# Patient Record
Sex: FEMALE | Race: WHITE | NOT HISPANIC OR LATINO | Employment: OTHER | RURAL
[De-identification: names, ages, dates, MRNs, and addresses within clinical notes are randomized per-mention and may not be internally consistent; named-entity substitution may affect disease eponyms.]

---

## 2024-01-18 ENCOUNTER — HOSPITAL ENCOUNTER (INPATIENT)
Facility: HOSPITAL | Age: 89
LOS: 19 days | Discharge: HOME | DRG: 177 | End: 2024-02-06
Attending: FAMILY MEDICINE | Admitting: FAMILY MEDICINE
Payer: MEDICARE

## 2024-01-18 DIAGNOSIS — R53.1 GENERALIZED WEAKNESS: ICD-10-CM

## 2024-01-18 DIAGNOSIS — B02.9 HERPES ZOSTER WITHOUT COMPLICATION: Primary | ICD-10-CM

## 2024-01-18 DIAGNOSIS — K21.9 GASTROESOPHAGEAL REFLUX DISEASE, UNSPECIFIED WHETHER ESOPHAGITIS PRESENT: ICD-10-CM

## 2024-01-18 DIAGNOSIS — R07.9 CHEST PAIN: ICD-10-CM

## 2024-01-18 DIAGNOSIS — R06.02 SHORTNESS OF BREATH: ICD-10-CM

## 2024-01-18 DIAGNOSIS — R07.89 ATYPICAL CHEST PAIN: ICD-10-CM

## 2024-01-18 DIAGNOSIS — J84.9 INTERSTITIAL LUNG DISEASE: ICD-10-CM

## 2024-01-18 DIAGNOSIS — R63.0 POOR APPETITE: ICD-10-CM

## 2024-01-18 DIAGNOSIS — E03.9 HYPOTHYROIDISM, UNSPECIFIED TYPE: ICD-10-CM

## 2024-01-18 DIAGNOSIS — E11.9 TYPE 2 DIABETES MELLITUS WITHOUT COMPLICATION, UNSPECIFIED WHETHER LONG TERM INSULIN USE: ICD-10-CM

## 2024-01-18 PROBLEM — I10 HYPERTENSION: Status: ACTIVE | Noted: 2024-01-18

## 2024-01-18 PROBLEM — M06.9 RHEUMATOID ARTHRITIS, UNSPECIFIED: Status: RESOLVED | Noted: 2024-01-18 | Resolved: 2024-01-18

## 2024-01-18 PROBLEM — U07.1 PNEUMONIA DUE TO COVID-19 VIRUS: Status: ACTIVE | Noted: 2024-01-18

## 2024-01-18 PROBLEM — M06.9 RHEUMATOID ARTHRITIS, UNSPECIFIED: Status: ACTIVE | Noted: 2024-01-18

## 2024-01-18 PROBLEM — J12.82 PNEUMONIA DUE TO COVID-19 VIRUS: Status: ACTIVE | Noted: 2024-01-18

## 2024-01-18 LAB
ANION GAP SERPL CALCULATED.3IONS-SCNC: 13 MMOL/L (ref 7–16)
ATYPICAL LYMPHOCYTES: ABNORMAL
BACTERIA #/AREA URNS HPF: NORMAL /HPF
BASOPHILS # BLD AUTO: 0.01 K/UL (ref 0–0.2)
BASOPHILS NFR BLD AUTO: 0.2 % (ref 0–1)
BILIRUB UR QL STRIP: NEGATIVE
BUN SERPL-MCNC: 12 MG/DL (ref 7–18)
BUN/CREAT SERPL: 11 (ref 6–20)
CALCIUM SERPL-MCNC: 9.8 MG/DL (ref 8.5–10.1)
CHLORIDE SERPL-SCNC: 100 MMOL/L (ref 98–107)
CLARITY UR: CLEAR
CO2 SERPL-SCNC: 26 MMOL/L (ref 21–32)
COLOR UR: YELLOW
CREAT SERPL-MCNC: 1.12 MG/DL (ref 0.55–1.02)
DIFFERENTIAL METHOD BLD: ABNORMAL
EGFR (NO RACE VARIABLE) (RUSH/TITUS): 47 ML/MIN/1.73M2
EOSINOPHIL # BLD AUTO: 0.05 K/UL (ref 0–0.5)
EOSINOPHIL NFR BLD AUTO: 1 % (ref 1–4)
EOSINOPHIL NFR BLD MANUAL: 1 % (ref 1–4)
ERYTHROCYTE [DISTWIDTH] IN BLOOD BY AUTOMATED COUNT: 12.6 % (ref 11.5–14.5)
GLUCOSE SERPL-MCNC: 243 MG/DL (ref 70–105)
GLUCOSE SERPL-MCNC: 269 MG/DL (ref 70–105)
GLUCOSE SERPL-MCNC: 304 MG/DL (ref 74–106)
GLUCOSE UR STRIP-MCNC: 500 MG/DL
HCT VFR BLD AUTO: 38 % (ref 38–47)
HGB BLD-MCNC: 12.9 G/DL (ref 12–16)
KETONES UR STRIP-SCNC: NEGATIVE MG/DL
LEUKOCYTE ESTERASE UR QL STRIP: NEGATIVE
LYMPHOCYTES # BLD AUTO: 0.85 K/UL (ref 1–4.8)
LYMPHOCYTES NFR BLD AUTO: 16.9 % (ref 27–41)
LYMPHOCYTES NFR BLD MANUAL: 21 % (ref 27–41)
MCH RBC QN AUTO: 28.5 PG (ref 27–31)
MCHC RBC AUTO-ENTMCNC: 33.9 G/DL (ref 32–36)
MCV RBC AUTO: 83.9 FL (ref 80–96)
MONOCYTES # BLD AUTO: 0.96 K/UL (ref 0–0.8)
MONOCYTES NFR BLD AUTO: 19.1 % (ref 2–6)
MONOCYTES NFR BLD MANUAL: 14 % (ref 2–6)
MPC BLD CALC-MCNC: 11.6 FL (ref 9.4–12.4)
NEUTROPHILS # BLD AUTO: 3.16 K/UL (ref 1.8–7.7)
NEUTROPHILS NFR BLD AUTO: 62.8 % (ref 53–65)
NEUTS BAND NFR BLD MANUAL: 7 % (ref 1–5)
NEUTS SEG NFR BLD MANUAL: 57 % (ref 50–62)
NITRITE UR QL STRIP: NEGATIVE
NRBC BLD MANUAL-RTO: ABNORMAL %
PH UR STRIP: 5.5 PH UNITS
PLATELET # BLD AUTO: 240 K/UL (ref 150–400)
PLATELET MORPHOLOGY: ABNORMAL
POTASSIUM SERPL-SCNC: 3.9 MMOL/L (ref 3.5–5.1)
PROT UR QL STRIP: 100
RBC # BLD AUTO: 4.53 M/UL (ref 4.2–5.4)
RBC # UR STRIP: NEGATIVE /UL
RBC MORPH BLD: NORMAL
REACTIVE LYMPHOCYTES: ABNORMAL
ROULEAUX BLD QL SMEAR: ABNORMAL
SODIUM SERPL-SCNC: 135 MMOL/L (ref 136–145)
SP GR UR STRIP: >=1.03
SQUAMOUS #/AREA URNS LPF: NORMAL /LPF
UROBILINOGEN UR STRIP-ACNC: 0.2 MG/DL
WBC # BLD AUTO: 5.03 K/UL (ref 4.5–11)
WBC #/AREA URNS HPF: NORMAL /HPF

## 2024-01-18 PROCEDURE — 99900035 HC TECH TIME PER 15 MIN (STAT)

## 2024-01-18 PROCEDURE — 27000982 HC MATTRESS, MATRIX LAL RENTAL

## 2024-01-18 PROCEDURE — 85025 COMPLETE CBC W/AUTO DIFF WBC: CPT | Performed by: NURSE PRACTITIONER

## 2024-01-18 PROCEDURE — 25000003 PHARM REV CODE 250: Performed by: FAMILY MEDICINE

## 2024-01-18 PROCEDURE — 82962 GLUCOSE BLOOD TEST: CPT

## 2024-01-18 PROCEDURE — 11000004 HC SNF PRIVATE

## 2024-01-18 PROCEDURE — 94761 N-INVAS EAR/PLS OXIMETRY MLT: CPT

## 2024-01-18 PROCEDURE — 63600175 PHARM REV CODE 636 W HCPCS: Performed by: NURSE PRACTITIONER

## 2024-01-18 PROCEDURE — 81003 URINALYSIS AUTO W/O SCOPE: CPT | Performed by: NURSE PRACTITIONER

## 2024-01-18 PROCEDURE — 83036 HEMOGLOBIN GLYCOSYLATED A1C: CPT | Performed by: NURSE PRACTITIONER

## 2024-01-18 PROCEDURE — 27000944

## 2024-01-18 PROCEDURE — 25000003 PHARM REV CODE 250: Performed by: NURSE PRACTITIONER

## 2024-01-18 PROCEDURE — 99305 1ST NF CARE MODERATE MDM 35: CPT | Mod: AI,,, | Performed by: FAMILY MEDICINE

## 2024-01-18 PROCEDURE — 80048 BASIC METABOLIC PNL TOTAL CA: CPT | Performed by: NURSE PRACTITIONER

## 2024-01-18 RX ORDER — ALBUTEROL SULFATE 0.83 MG/ML
2.5 SOLUTION RESPIRATORY (INHALATION) EVERY 6 HOURS PRN
Status: DISCONTINUED | OUTPATIENT
Start: 2024-01-18 | End: 2024-02-06 | Stop reason: HOSPADM

## 2024-01-18 RX ORDER — CALCIUM CARBONATE 200(500)MG
500 TABLET,CHEWABLE ORAL 2 TIMES DAILY PRN
Status: DISCONTINUED | OUTPATIENT
Start: 2024-01-18 | End: 2024-02-06 | Stop reason: HOSPADM

## 2024-01-18 RX ORDER — FAMOTIDINE 20 MG/1
20 TABLET, FILM COATED ORAL DAILY
Status: DISCONTINUED | OUTPATIENT
Start: 2024-01-19 | End: 2024-02-06 | Stop reason: HOSPADM

## 2024-01-18 RX ORDER — CHOLECALCIFEROL (VITAMIN D3) 25 MCG
2000 TABLET ORAL DAILY
Status: DISCONTINUED | OUTPATIENT
Start: 2024-01-19 | End: 2024-01-26

## 2024-01-18 RX ORDER — INSULIN ASPART 100 [IU]/ML
0-5 INJECTION, SOLUTION INTRAVENOUS; SUBCUTANEOUS
Status: COMPLETED | OUTPATIENT
Start: 2024-01-18 | End: 2024-01-22

## 2024-01-18 RX ORDER — CHOLECALCIFEROL (VITAMIN D3) 25 MCG
2000 TABLET ORAL DAILY
Status: ON HOLD | COMMUNITY
End: 2024-02-05 | Stop reason: HOSPADM

## 2024-01-18 RX ORDER — DOCUSATE SODIUM 100 MG/1
100 CAPSULE, LIQUID FILLED ORAL 2 TIMES DAILY PRN
Status: DISCONTINUED | OUTPATIENT
Start: 2024-01-18 | End: 2024-02-06 | Stop reason: HOSPADM

## 2024-01-18 RX ORDER — GLUCOSAM/CHONDRO/HERB 149/HYAL 750-100 MG
1 TABLET ORAL DAILY
Status: DISCONTINUED | OUTPATIENT
Start: 2024-01-19 | End: 2024-02-06 | Stop reason: HOSPADM

## 2024-01-18 RX ORDER — MECLIZINE HYDROCHLORIDE 50 MG/1
25 TABLET ORAL 3 TIMES DAILY PRN
Status: ON HOLD | COMMUNITY
End: 2024-02-05 | Stop reason: HOSPADM

## 2024-01-18 RX ORDER — LEVOTHYROXINE SODIUM 50 UG/1
50 TABLET ORAL
Status: ON HOLD | COMMUNITY
End: 2024-02-05

## 2024-01-18 RX ORDER — ALBUTEROL SULFATE 90 UG/1
2 AEROSOL, METERED RESPIRATORY (INHALATION) EVERY 4 HOURS PRN
Status: DISCONTINUED | OUTPATIENT
Start: 2024-01-18 | End: 2024-01-18

## 2024-01-18 RX ORDER — CETIRIZINE HYDROCHLORIDE 10 MG/1
10 TABLET ORAL DAILY PRN
Status: ON HOLD | COMMUNITY
End: 2024-02-05 | Stop reason: HOSPADM

## 2024-01-18 RX ORDER — GABAPENTIN 100 MG/1
100 CAPSULE ORAL 3 TIMES DAILY PRN
Status: ON HOLD | COMMUNITY
End: 2024-02-05

## 2024-01-18 RX ORDER — ASCORBIC ACID 500 MG
1000 TABLET ORAL 2 TIMES DAILY
Status: DISCONTINUED | OUTPATIENT
Start: 2024-01-19 | End: 2024-02-06 | Stop reason: HOSPADM

## 2024-01-18 RX ORDER — GLUCAGON 1 MG
1 KIT INJECTION
Status: DISCONTINUED | OUTPATIENT
Start: 2024-01-18 | End: 2024-02-06 | Stop reason: HOSPADM

## 2024-01-18 RX ORDER — LEVOFLOXACIN 500 MG/1
500 TABLET, FILM COATED ORAL DAILY
Status: ON HOLD | COMMUNITY
Start: 2024-01-19 | End: 2024-02-05 | Stop reason: HOSPADM

## 2024-01-18 RX ORDER — FAMOTIDINE 20 MG/1
20 TABLET, FILM COATED ORAL NIGHTLY
Status: ON HOLD | COMMUNITY
End: 2024-02-05

## 2024-01-18 RX ORDER — ZINC SULFATE 50(220)MG
220 CAPSULE ORAL DAILY
Status: ON HOLD | COMMUNITY
End: 2024-02-05 | Stop reason: HOSPADM

## 2024-01-18 RX ORDER — HYDRALAZINE HYDROCHLORIDE 25 MG/1
25 TABLET, FILM COATED ORAL 2 TIMES DAILY
Status: DISCONTINUED | OUTPATIENT
Start: 2024-01-18 | End: 2024-02-06 | Stop reason: HOSPADM

## 2024-01-18 RX ORDER — AMOXICILLIN 250 MG
1 CAPSULE ORAL 2 TIMES DAILY
Status: DISCONTINUED | OUTPATIENT
Start: 2024-01-18 | End: 2024-02-06 | Stop reason: HOSPADM

## 2024-01-18 RX ORDER — IBUPROFEN 100 MG/5ML
1000 SUSPENSION, ORAL (FINAL DOSE FORM) ORAL 2 TIMES DAILY
Status: ON HOLD | COMMUNITY
End: 2024-02-05 | Stop reason: HOSPADM

## 2024-01-18 RX ORDER — AMOXICILLIN 500 MG
1 CAPSULE ORAL DAILY
Status: ON HOLD | COMMUNITY
End: 2024-02-05

## 2024-01-18 RX ORDER — TALC
6 POWDER (GRAM) TOPICAL NIGHTLY PRN
Status: DISCONTINUED | OUTPATIENT
Start: 2024-01-18 | End: 2024-01-26

## 2024-01-18 RX ORDER — LEVOFLOXACIN 500 MG/1
500 TABLET, FILM COATED ORAL DAILY
Status: COMPLETED | OUTPATIENT
Start: 2024-01-19 | End: 2024-01-20

## 2024-01-18 RX ORDER — IBUPROFEN 200 MG
16 TABLET ORAL
Status: DISCONTINUED | OUTPATIENT
Start: 2024-01-18 | End: 2024-02-06 | Stop reason: HOSPADM

## 2024-01-18 RX ORDER — ACETAMINOPHEN 325 MG/1
650 TABLET ORAL EVERY 6 HOURS PRN
Status: DISCONTINUED | OUTPATIENT
Start: 2024-01-18 | End: 2024-02-06 | Stop reason: HOSPADM

## 2024-01-18 RX ORDER — GABAPENTIN 100 MG/1
100 CAPSULE ORAL 3 TIMES DAILY PRN
Status: DISCONTINUED | OUTPATIENT
Start: 2024-01-18 | End: 2024-02-06 | Stop reason: HOSPADM

## 2024-01-18 RX ORDER — DOCUSATE SODIUM 100 MG/1
100 CAPSULE, LIQUID FILLED ORAL DAILY
Status: DISCONTINUED | OUTPATIENT
Start: 2024-01-18 | End: 2024-01-18

## 2024-01-18 RX ORDER — HYDRALAZINE HYDROCHLORIDE 25 MG/1
25 TABLET, FILM COATED ORAL 2 TIMES DAILY
Status: ON HOLD | COMMUNITY
End: 2024-02-05

## 2024-01-18 RX ORDER — CETIRIZINE HYDROCHLORIDE 10 MG/1
10 TABLET ORAL DAILY PRN
Status: DISCONTINUED | OUTPATIENT
Start: 2024-01-18 | End: 2024-01-22

## 2024-01-18 RX ORDER — THEOPHYLLINE 400 MG/1
200 TABLET, EXTENDED RELEASE ORAL 2 TIMES DAILY
Status: ON HOLD | COMMUNITY
End: 2024-02-05 | Stop reason: HOSPADM

## 2024-01-18 RX ORDER — IBUPROFEN 200 MG
24 TABLET ORAL
Status: DISCONTINUED | OUTPATIENT
Start: 2024-01-18 | End: 2024-02-06 | Stop reason: HOSPADM

## 2024-01-18 RX ORDER — METOPROLOL TARTRATE 50 MG/1
50 TABLET ORAL DAILY
Status: DISCONTINUED | OUTPATIENT
Start: 2024-01-19 | End: 2024-02-06 | Stop reason: HOSPADM

## 2024-01-18 RX ORDER — ENOXAPARIN SODIUM 100 MG/ML
40 INJECTION SUBCUTANEOUS EVERY 24 HOURS
Status: DISCONTINUED | OUTPATIENT
Start: 2024-01-18 | End: 2024-01-19 | Stop reason: DRUGHIGH

## 2024-01-18 RX ORDER — MECLIZINE HCL 12.5 MG 12.5 MG/1
25 TABLET ORAL 3 TIMES DAILY PRN
Status: DISCONTINUED | OUTPATIENT
Start: 2024-01-18 | End: 2024-01-30

## 2024-01-18 RX ORDER — LANOLIN ALCOHOL/MO/W.PET/CERES
400 CREAM (GRAM) TOPICAL DAILY
Status: DISCONTINUED | OUTPATIENT
Start: 2024-01-19 | End: 2024-02-06 | Stop reason: HOSPADM

## 2024-01-18 RX ORDER — MUPIROCIN 20 MG/G
OINTMENT TOPICAL 2 TIMES DAILY
Status: COMPLETED | OUTPATIENT
Start: 2024-01-18 | End: 2024-01-23

## 2024-01-18 RX ORDER — ZINC SULFATE 50(220)MG
220 CAPSULE ORAL DAILY
Status: DISCONTINUED | OUTPATIENT
Start: 2024-01-19 | End: 2024-01-26

## 2024-01-18 RX ORDER — METOPROLOL TARTRATE 100 MG/1
50 TABLET ORAL DAILY
Status: ON HOLD | COMMUNITY
End: 2024-02-05

## 2024-01-18 RX ORDER — LEVOTHYROXINE SODIUM 50 UG/1
50 TABLET ORAL
Status: DISCONTINUED | OUTPATIENT
Start: 2024-01-19 | End: 2024-02-06 | Stop reason: HOSPADM

## 2024-01-18 RX ORDER — DOCUSATE SODIUM 100 MG/1
100 CAPSULE, LIQUID FILLED ORAL 2 TIMES DAILY PRN
Status: ON HOLD | COMMUNITY
End: 2024-02-05

## 2024-01-18 RX ORDER — ONDANSETRON 4 MG/1
4 TABLET, ORALLY DISINTEGRATING ORAL EVERY 8 HOURS PRN
Status: DISCONTINUED | OUTPATIENT
Start: 2024-01-18 | End: 2024-02-06 | Stop reason: HOSPADM

## 2024-01-18 RX ORDER — ALBUTEROL SULFATE 90 UG/1
2 AEROSOL, METERED RESPIRATORY (INHALATION) EVERY 6 HOURS PRN
Status: DISCONTINUED | OUTPATIENT
Start: 2024-01-18 | End: 2024-01-18

## 2024-01-18 RX ADMIN — THEOPHYLLINE ANHYDROUS 200 MG: 200 CAPSULE, EXTENDED RELEASE ORAL at 08:01

## 2024-01-18 RX ADMIN — ENOXAPARIN SODIUM 40 MG: 100 INJECTION SUBCUTANEOUS at 05:01

## 2024-01-18 RX ADMIN — HYDRALAZINE HYDROCHLORIDE 25 MG: 25 TABLET ORAL at 08:01

## 2024-01-18 RX ADMIN — INSULIN ASPART 1 UNITS: 100 INJECTION, SOLUTION INTRAVENOUS; SUBCUTANEOUS at 09:01

## 2024-01-18 RX ADMIN — SENNOSIDES AND DOCUSATE SODIUM 1 TABLET: 8.6; 5 TABLET ORAL at 08:01

## 2024-01-18 RX ADMIN — MUPIROCIN: 20 OINTMENT TOPICAL at 09:01

## 2024-01-18 RX ADMIN — ACETAMINOPHEN 650 MG: 325 TABLET ORAL at 11:01

## 2024-01-18 NOTE — NURSING
Received patient to room 1112 via wheelchair. Patients family brought her from [facility]. No acute distress noted. Resp even et unlabored. Safety measures in place. CB in reach. Family at bedside. Will cont to monitor.

## 2024-01-18 NOTE — SUBJECTIVE & OBJECTIVE
Past Medical History:   Diagnosis Date    Arthritis     Asthma     Benign paroxysmal vertigo, bilateral     COPD (chronic obstructive pulmonary disease)     COVID     Diabetes mellitus     Diverticulitis     Hemorrhoids     Hypertension     Hypothyroidism, unspecified     Interstitial lung disease     Psoriasis     Rheumatoid arthritis, unspecified        Past Surgical History:   Procedure Laterality Date    CARDIAC CATHETERIZATION      cataract surgery Bilateral     CHOLECYSTECTOMY      COLONOSCOPY      ESOPHAGOGASTRODUODENOSCOPY      HYSTERECTOMY      INGUINAL HERNIA REPAIR         Review of patient's allergies indicates:   Allergen Reactions    Cefdinir     Clarithromycin     Meperidine        No current facility-administered medications on file prior to encounter.     No current outpatient medications on file prior to encounter.     Family History    None       Tobacco Use    Smoking status: Never    Smokeless tobacco: Never   Substance and Sexual Activity    Alcohol use: Never    Drug use: Not on file    Sexual activity: Not on file     Review of Systems   Constitutional:  Positive for appetite change and fatigue. Negative for fever.        Reports poor appetite for past week   HENT:  Negative for sore throat and trouble swallowing.    Eyes:  Negative for redness.   Respiratory:  Positive for cough. Negative for choking, chest tightness and shortness of breath.    Gastrointestinal:  Positive for nausea. Negative for abdominal distention, abdominal pain, constipation, diarrhea and vomiting.        Reports having slight nausea when she thinks about eating - for past week    Genitourinary:  Negative for dysuria.   Musculoskeletal:  Negative for arthralgias, back pain, neck pain and neck stiffness.   Skin:  Negative for pallor and wound.        Reports bruising easily   Neurological:  Negative for light-headedness and headaches.     Objective:     Vital Signs (Most Recent):  Temp: 97.9 °F (36.6 °C) (01/18/24  1436)  Pulse: 85 (01/18/24 1436)  Resp: 20 (01/18/24 1436)  BP: (!) 158/80 (01/18/24 1436)  SpO2: 95 % (01/18/24 1436) Vital Signs (24h Range):  Temp:  [97.9 °F (36.6 °C)] 97.9 °F (36.6 °C)  Pulse:  [85] 85  Resp:  [20] 20  SpO2:  [95 %] 95 %  BP: (158)/(80) 158/80     Weight: 50.3 kg (111 lb)  Body mass index is 20.3 kg/m².     Physical Exam  HENT:      Head: Normocephalic.      Mouth/Throat:      Mouth: Mucous membranes are moist.   Cardiovascular:      Rate and Rhythm: Normal rate and regular rhythm.      Pulses: Normal pulses.      Heart sounds: No murmur heard.     No friction rub. No gallop.   Pulmonary:      Effort: Pulmonary effort is normal. No respiratory distress.      Breath sounds: No wheezing or rhonchi.      Comments: Sat on room air is 95%  Abdominal:      General: Bowel sounds are normal. There is no distension.      Palpations: Abdomen is soft. There is no mass.      Tenderness: There is no abdominal tenderness. There is no guarding or rebound.      Hernia: No hernia is present.   Musculoskeletal:         General: Normal range of motion.      Cervical back: Normal range of motion.   Skin:     General: Skin is warm and dry.      Coloration: Skin is not pale.      Findings: No erythema, lesion or rash.   Neurological:      Mental Status: She is alert and oriented to person, place, and time.      Motor: Weakness present.   Psychiatric:         Mood and Affect: Mood normal.                Significant Labs: All pertinent labs within the past 24 hours have been reviewed.  Recent Lab Results       None            Significant Imaging: I have reviewed all pertinent imaging results/findings within the past 24 hours.

## 2024-01-18 NOTE — ASSESSMENT & PLAN NOTE
Home med is glimepiride 4 mg po bid   Last A1c reviewed- ordered  Current correctional scale  Low  Maintain anti-hyperglycemic dose as follows-   Antihyperglycemics (From admission, onward)      None          Hold Oral hypoglycemics while patient is in the hospital.    Accuchecks ac and hs with low dose SSI coverage

## 2024-01-18 NOTE — HPI
Jackelin Owens is an 89 year old female with pmh of hypertension, type 2 diabetes mellitus, hypothyroidism and interstitial lung disease.Records show that she was diagnosed with covid and flu approx 2 1/2 weeks ago. She was not prescribed tamiflu or paxlovid but was given an antibiotic. She presented to ER at [facility] with increasing SOB on 01/14/2024. Admitted and treated for covid 19 pneumonia. She tested positive for covid while in [facility] ER. She was treated with Levaquin and remdesevir, supplemental o2 . She has been working with therapy on strengthening but continues to have weakness. She has been accepted to [facility] for swing bed placement       01/18/24 VS on arrival are T98.1-87-/80 sat 95% room air. She is awake, alert, oriented. Accompanied by family members. Complains of occasional productive cough, weakness and poor appetite. Discussed code status and she chose [facility].

## 2024-01-18 NOTE — ASSESSMENT & PLAN NOTE
01/18/24 continue lopressor 50 mg po daily     And apresoline 25 mg po BID      While in the hospital, will manage blood pressure as follows; Continue home antihypertensive regimen    Will utilize p.r.n. blood pressure medication only if patient's blood pressure greater than 180/110 and she develops symptoms such as worsening chest pain or shortness of breath.

## 2024-01-18 NOTE — ASSESSMENT & PLAN NOTE
01/18/24 PT and OT to evaluate and treat   Reocrds show that she lives alone. She is household ambulator with rollator.

## 2024-01-18 NOTE — H&P
[facility] - Medical Surgical Unit  Hospital Medicine  History & Physical    Patient Name: Jackelin Owens  MRN: [id number]  Patient Class: IP- Swing  Admission Date: 1/18/2024  Attending Physician: Home Alexis IV, DO   Primary Care Provider: Chaparrita HEWITT         Patient information was obtained from past medical records and ER records.     Subjective:     Principal Problem:Generalized weakness    Chief Complaint:   Chief Complaint   Patient presents with    Weakness        HPI: Jackelin Owens is an 89 year old female with pmh of hypertension, type 2 diabetes mellitus, hypothyroidism and interstitial lung disease.Records show that she was diagnosed with covid and flu approx 2 1/2 weeks ago. She was not prescribed tamiflu or paxlovid but was given an antibiotic. She presented to ER at [facility] with increasing SOB on 01/14/2024. Admitted and treated for covid 19 pneumonia. She tested positive for covid while in [facility] ER. She was treated with Levaquin and remdesevir, supplemental o2 . She has been working with therapy on strengthening but continues to have weakness. She has been accepted to [facility] for swing bed placement       01/18/24 VS on arrival are T98.1-87-/80 sat 95% room air. She is awake, alert, oriented. Accompanied by family members. Complains of occasional productive cough, weakness and poor appetite. Discussed code status and she chose [facility].    Past Medical History:   Diagnosis Date    Arthritis     Asthma     Benign paroxysmal vertigo, bilateral     COPD (chronic obstructive pulmonary disease)     COVID     Diabetes mellitus     Diverticulitis     Hemorrhoids     Hypertension     Hypothyroidism, unspecified     Interstitial lung disease     Psoriasis     Rheumatoid arthritis, unspecified        Past Surgical History:   Procedure Laterality Date    CARDIAC CATHETERIZATION      cataract surgery Bilateral     CHOLECYSTECTOMY      COLONOSCOPY       ESOPHAGOGASTRODUODENOSCOPY      HYSTERECTOMY      INGUINAL HERNIA REPAIR         Review of patient's allergies indicates:   Allergen Reactions    Cefdinir     Clarithromycin     Meperidine        No current facility-administered medications on file prior to encounter.     No current outpatient medications on file prior to encounter.     Family History    None       Tobacco Use    Smoking status: Never    Smokeless tobacco: Never   Substance and Sexual Activity    Alcohol use: Never    Drug use: Not on file    Sexual activity: Not on file     Review of Systems   Constitutional:  Positive for appetite change and fatigue. Negative for fever.        Reports poor appetite for past week   HENT:  Negative for sore throat and trouble swallowing.    Eyes:  Negative for redness.   Respiratory:  Positive for cough. Negative for choking, chest tightness and shortness of breath.    Gastrointestinal:  Positive for nausea. Negative for abdominal distention, abdominal pain, constipation, diarrhea and vomiting.        Reports having slight nausea when she thinks about eating - for past week    Genitourinary:  Negative for dysuria.   Musculoskeletal:  Negative for arthralgias, back pain, neck pain and neck stiffness.   Skin:  Negative for pallor and wound.        Reports bruising easily   Neurological:  Negative for light-headedness and headaches.     Objective:     Vital Signs (Most Recent):  Temp: 97.9 °F (36.6 °C) (01/18/24 1436)  Pulse: 85 (01/18/24 1436)  Resp: 20 (01/18/24 1436)  BP: (!) 158/80 (01/18/24 1436)  SpO2: 95 % (01/18/24 1436) Vital Signs (24h Range):  Temp:  [97.9 °F (36.6 °C)] 97.9 °F (36.6 °C)  Pulse:  [85] 85  Resp:  [20] 20  SpO2:  [95 %] 95 %  BP: (158)/(80) 158/80     Weight: 50.3 kg (111 lb)  Body mass index is 20.3 kg/m².     Physical Exam  HENT:      Head: Normocephalic.      Mouth/Throat:      Mouth: Mucous membranes are moist.   Cardiovascular:      Rate and Rhythm: Normal rate and regular rhythm.       Pulses: Normal pulses.      Heart sounds: No murmur heard.     No friction rub. No gallop.   Pulmonary:      Effort: Pulmonary effort is normal. No respiratory distress.      Breath sounds: No wheezing or rhonchi.      Comments: Sat on room air is 95%  Abdominal:      General: Bowel sounds are normal. There is no distension.      Palpations: Abdomen is soft. There is no mass.      Tenderness: There is no abdominal tenderness. There is no guarding or rebound.      Hernia: No hernia is present.   Musculoskeletal:         General: Normal range of motion.      Cervical back: Normal range of motion.   Skin:     General: Skin is warm and dry.      Coloration: Skin is not pale.      Findings: No erythema, lesion or rash.   Neurological:      Mental Status: She is alert and oriented to person, place, and time.      Motor: Weakness present.   Psychiatric:         Mood and Affect: Mood normal.                Significant Labs: All pertinent labs within the past 24 hours have been reviewed.  Recent Lab Results       None            Significant Imaging: I have reviewed all pertinent imaging results/findings within the past 24 hours.  Assessment/Plan:     * Generalized weakness  01/18/24 PT and OT to evaluate and treat   Reocrds show that she lives alone. She is household ambulator with rollator.    Pneumonia due to COVID-19 virus  01/18/24 has recd levaquin and remdesevir for left lower lobe pneumonia       Initiate standard COVID protocols; COVID-19 testing ,Infection Control notification  and isolation- respiratory, contact and droplet per protocol        Management: Inhaled bronchodilators as needed for shortness of breath.  Supplemental o2 as needed     Advance Care Planning Current advance care plan has been discussed with patient/family/POA and patient currently wishes DNR (Do Not Resuscitate).       Daughter states pt first tested positive for covid on 01/04  She is out of isolation window  O2 sat on room air is  95%    Type 2 diabetes mellitus  Home med is glimepiride 4 mg po bid   Last A1c reviewed- ordered  Current correctional scale  Low  Maintain anti-hyperglycemic dose as follows-   Antihyperglycemics (From admission, onward)      None          Hold Oral hypoglycemics while patient is in the hospital.    Accuchecks ac and hs with low dose SSI coverage     Hypertension  01/18/24 continue lopressor 50 mg po daily     And apresoline 25 mg po BID      While in the hospital, will manage blood pressure as follows; Continue home antihypertensive regimen    Will utilize p.r.n. blood pressure medication only if patient's blood pressure greater than 180/110 and she develops symptoms such as worsening chest pain or shortness of breath.    Interstitial lung disease  01/18/24 continue theophylline 200 mg po BID       Hypothyroidism  01/18/24 continue levothyroxine 50 mcg daily      Poor appetite    01/18/24 diabetic diet   Ensure supplement TID and prn     GERD (gastroesophageal reflux disease)  01/18/24 continue famotidine          VTE Risk Mitigation (From admission, onward)           Ordered     enoxaparin injection 40 mg  Every 24 hours         01/18/24 1435     IP VTE LOW RISK PATIENT  Once         01/18/24 1434     Place CARLOS hose  Until discontinued         01/18/24 1434                                    Home Alexis IV, DO  Department of Hospital Medicine  [facility] - Medical Surgical Unit

## 2024-01-18 NOTE — ASSESSMENT & PLAN NOTE
01/18/24 has recd levaquin and remdesevir for left lower lobe pneumonia       Initiate standard COVID protocols; COVID-19 testing ,Infection Control notification  and isolation- respiratory, contact and droplet per protocol        Management: Inhaled bronchodilators as needed for shortness of breath.  Supplemental o2 as needed     Advance Care Planning  Current advance care plan has been discussed with patient/family/POA and patient currently wishes DNR (Do Not Resuscitate).       Daughter states pt first tested positive for covid on 01/04  She is out of isolation window  O2 sat on room air is 95%

## 2024-01-19 LAB
EST. AVERAGE GLUCOSE BLD GHB EST-MCNC: 186 MG/DL
GLUCOSE SERPL-MCNC: 159 MG/DL (ref 70–105)
GLUCOSE SERPL-MCNC: 159 MG/DL (ref 70–105)
GLUCOSE SERPL-MCNC: 311 MG/DL (ref 70–105)
GLUCOSE SERPL-MCNC: 338 MG/DL (ref 70–105)
HBA1C MFR BLD HPLC: 8.1 % (ref 4.5–6.6)

## 2024-01-19 PROCEDURE — 94761 N-INVAS EAR/PLS OXIMETRY MLT: CPT

## 2024-01-19 PROCEDURE — 27000982 HC MATTRESS, MATRIX LAL RENTAL

## 2024-01-19 PROCEDURE — 82962 GLUCOSE BLOOD TEST: CPT

## 2024-01-19 PROCEDURE — 25000003 PHARM REV CODE 250: Performed by: FAMILY MEDICINE

## 2024-01-19 PROCEDURE — 97165 OT EVAL LOW COMPLEX 30 MIN: CPT

## 2024-01-19 PROCEDURE — 25000003 PHARM REV CODE 250: Performed by: NURSE PRACTITIONER

## 2024-01-19 PROCEDURE — 63600175 PHARM REV CODE 636 W HCPCS: Performed by: FAMILY MEDICINE

## 2024-01-19 PROCEDURE — 27000944

## 2024-01-19 PROCEDURE — 11000004 HC SNF PRIVATE

## 2024-01-19 PROCEDURE — 97161 PT EVAL LOW COMPLEX 20 MIN: CPT

## 2024-01-19 RX ORDER — ENOXAPARIN SODIUM 100 MG/ML
30 INJECTION SUBCUTANEOUS EVERY 24 HOURS
Status: DISCONTINUED | OUTPATIENT
Start: 2024-01-19 | End: 2024-02-06 | Stop reason: HOSPADM

## 2024-01-19 RX ORDER — ALUMINUM HYDROXIDE, MAGNESIUM HYDROXIDE, AND SIMETHICONE 1200; 120; 1200 MG/30ML; MG/30ML; MG/30ML
30 SUSPENSION ORAL 3 TIMES DAILY
Status: DISPENSED | OUTPATIENT
Start: 2024-01-19 | End: 2024-01-22

## 2024-01-19 RX ORDER — LIDOCAINE HYDROCHLORIDE 20 MG/ML
15 SOLUTION OROPHARYNGEAL 3 TIMES DAILY
Status: DISPENSED | OUTPATIENT
Start: 2024-01-19 | End: 2024-01-22

## 2024-01-19 RX ADMIN — MELATONIN TAB 3 MG 6 MG: 3 TAB at 11:01

## 2024-01-19 RX ADMIN — GABAPENTIN 100 MG: 100 CAPSULE ORAL at 08:01

## 2024-01-19 RX ADMIN — ENOXAPARIN SODIUM 30 MG: 30 INJECTION SUBCUTANEOUS at 04:01

## 2024-01-19 RX ADMIN — LEVOFLOXACIN 500 MG: 500 TABLET, FILM COATED ORAL at 08:01

## 2024-01-19 RX ADMIN — MECLIZINE 25 MG: 12.5 TABLET ORAL at 08:01

## 2024-01-19 RX ADMIN — ZINC SULFATE 220 MG (50 MG) CAPSULE 220 MG: CAPSULE at 08:01

## 2024-01-19 RX ADMIN — THEOPHYLLINE ANHYDROUS 200 MG: 200 CAPSULE, EXTENDED RELEASE ORAL at 08:01

## 2024-01-19 RX ADMIN — HYDRALAZINE HYDROCHLORIDE 25 MG: 25 TABLET ORAL at 08:01

## 2024-01-19 RX ADMIN — OMEGA-3 FATTY ACIDS CAP 1000 MG 1 CAPSULE: 1000 CAP at 08:01

## 2024-01-19 RX ADMIN — OXYCODONE HYDROCHLORIDE AND ACETAMINOPHEN 1000 MG: 500 TABLET ORAL at 08:01

## 2024-01-19 RX ADMIN — SENNOSIDES AND DOCUSATE SODIUM 1 TABLET: 8.6; 5 TABLET ORAL at 08:01

## 2024-01-19 RX ADMIN — MUPIROCIN: 20 OINTMENT TOPICAL at 08:01

## 2024-01-19 RX ADMIN — ONDANSETRON 4 MG: 4 TABLET, ORALLY DISINTEGRATING ORAL at 08:01

## 2024-01-19 RX ADMIN — INSULIN ASPART 2 UNITS: 100 INJECTION, SOLUTION INTRAVENOUS; SUBCUTANEOUS at 08:01

## 2024-01-19 RX ADMIN — FAMOTIDINE 20 MG: 20 TABLET ORAL at 08:01

## 2024-01-19 RX ADMIN — LIDOCAINE HYDROCHLORIDE 15 ML: 20 SOLUTION ORAL at 08:01

## 2024-01-19 RX ADMIN — Medication 400 MG: at 08:01

## 2024-01-19 RX ADMIN — LIDOCAINE HYDROCHLORIDE 15 ML: 20 SOLUTION ORAL at 03:01

## 2024-01-19 RX ADMIN — ALUMINUM HYDROXIDE, MAGNESIUM HYDROXIDE, AND SIMETHICONE 30 ML: 200; 200; 20 SUSPENSION ORAL at 08:01

## 2024-01-19 RX ADMIN — INSULIN ASPART 4 UNITS: 100 INJECTION, SOLUTION INTRAVENOUS; SUBCUTANEOUS at 11:01

## 2024-01-19 RX ADMIN — ALUMINUM HYDROXIDE, MAGNESIUM HYDROXIDE, AND SIMETHICONE 30 ML: 200; 200; 20 SUSPENSION ORAL at 03:01

## 2024-01-19 RX ADMIN — METOPROLOL TARTRATE 50 MG: 50 TABLET, FILM COATED ORAL at 08:01

## 2024-01-19 RX ADMIN — LEVOTHYROXINE SODIUM 50 MCG: 50 TABLET ORAL at 06:01

## 2024-01-19 RX ADMIN — ACETAMINOPHEN 650 MG: 325 TABLET ORAL at 08:01

## 2024-01-19 RX ADMIN — CHOLECALCIFEROL TAB 25 MCG (1000 UNIT) 2000 UNITS: 25 TAB at 08:01

## 2024-01-19 NOTE — PLAN OF CARE
Problem: Occupational Therapy  Goal: Occupational Therapy Goal  Description: Grooming Status:   Short Term Goal: Pt will perform grooming with Min A sitting EOB.   Long Term Goal: Pt will perform grooming/oral hygiene standing at sink with SBA.      LE dressing Status:   Short Term Goal: Pt will perform LE dressing with Min A.   Long Term Goal: Pt will perform LE dressing with SBA.    Toileting Status:   Short Term Goal: Pt will perform toilet hygiene on BSC with Min A.  Long Term Goal: Pt will perform toilet hygiene on toilet with no AE with SBA.    Commode Transfer:   Short Term Goal: Pt will perform BSC t/f with Min A.  Long Term Goal:  Pt will perform toilet t/f in bathroom with SBA.     Bathing Status:   Long Term Goal: Pt will perform sponge bath with SBA with no unsafe fatigue.     Strength Status: 5/5 BUEs  Long Term Goal: Pt to perform BUE strengthening with weights and/or body weight to increase ADL independence and safety    Endurance Status:   Short Term Goal:pt to perform 15 min OT treatment with 5 or greater rest breaks  Long Term Goal: pt to perform 30 min OT treat with 3 or less rest breaks     Outcome: Ongoing, Progressing

## 2024-01-19 NOTE — PT/OT/SLP EVAL
Occupational Therapy   Evaluation    Name: Jackelin Owens  MRN: [id number]  Admitting Diagnosis: Generalized weakness  Recent Surgery: * No surgery found *      Recommendations:     Discharge Recommendations: Low Intensity Therapy  Discharge Equipment Recommendations:  none  Barriers to discharge:  None    Assessment:     Jackelin Owens is a 89 y.o. female with a medical diagnosis of Generalized weakness.  She presents with weakness and decline with ADLs. Performance deficits affecting function: weakness, impaired endurance, impaired self care skills, impaired functional mobility, impaired balance, decreased safety awareness, decreased lower extremity function, decreased upper extremity function.      Rehab Prognosis: Fair; patient would benefit from acute skilled OT services to address these deficits and reach maximum level of function.       Plan:     Patient to be seen 5 x/week to address the above listed problems via self-care/home management, therapeutic activities, therapeutic exercises  Plan of Care Expires: 02/18/24  Plan of Care Reviewed with: patient    Subjective     Chief Complaint: Pain and weakness  Patient/Family Comments/goals: to return to prior level of function    Occupational Profile:  Living Environment: Patient lives at home alone  Previous level of function: Independent with ADLs  Roles and Routines: Homemaker  Equipment Used at Home: cane, straight, rollator  Assistance upon Discharge: Recommend D/C to home with HH    Pain/Comfort:  Pain Rating 1: 0/10  Pain Rating Post-Intervention 1: 0/10    Patients cultural, spiritual, Oriental orthodox conflicts given the current situation: no    Objective:     Communicated with: Nurse prior to session.  Patient found supine with peripheral IV upon OT entry to room.    General Precautions: Standard, fall  Orthopedic Precautions: N/A  Braces: N/A  Respiratory Status: Room air    Occupational Performance:    Bed Mobility:    Patient completed Rolling/Turning to  Left with  stand by assistance  Patient completed Rolling/Turning to Right with stand by assistance  Patient completed Scooting/Bridging with minimum assistance  Patient completed Supine to Sit with minimum assistance    Functional Mobility/Transfers:  Patient completed Sit <> Stand Transfer with minimum assistance  with  quad cane   Functional Mobility: Patient transferred to door and back using QC with Min A to hold therpist'sother hand as well    Activities of Daily Living:  Grooming: minimum assistance to wash face and comb hair  Upper Body Dressing: moderate assistance to pennie gown  Lower Body Dressing: total assistance to pennie socks    Cognitive/Visual Perceptual:  Cognitive/Psychosocial Skills:     -       Oriented to: Person, Place, and Situation   -       Follows Commands/attention:Follows two-step commands  -       Communication: clear/fluent  -       Safety awareness/insight to disability: impaired   Visual/Perceptual:      -Intact WFLs    Physical Exam:  Balance:    -       sitting: good and standing: fair  Dominant hand:    -       right  Upper Extremity Range of Motion:     -       Right Upper Extremity: WFL  -       Left Upper Extremity: WFL  Upper Extremity Strength:    -       Right Upper Extremity: 3/5 grossly  -       Left Upper Extremity: 3/5 grossly   Strength:    -       Right Upper Extremity: WFL  -       Left Upper Extremity: WFL  Fine Motor Coordination:    -       Intact  Gross motor coordination:   WFL    AMPAC 6 Click ADL:  AMPAC Total Score: 16    Treatment & Education:  Pt educated on OT role/POC.   Importance of OOB activity with staff assistance.  Importance of sitting up in the chair throughout the day as tolerated, especially for meals   Safety during functional t/f and mobility  Importance of assisting with self-care activities      Patient left HOB elevated with all lines intact and call button in reach    GOALS:   Multidisciplinary Problems       Occupational Therapy Goals           Problem: Occupational Therapy    Goal Priority Disciplines Outcome Interventions   Occupational Therapy Goal     OT, PT/OT Ongoing, Progressing    Description: Grooming Status:   Short Term Goal: Pt will perform grooming with Min A sitting EOB.   Long Term Goal: Pt will perform grooming/oral hygiene standing at sink with SBA.      LE dressing Status:   Short Term Goal: Pt will perform LE dressing with Min A.   Long Term Goal: Pt will perform LE dressing with SBA.    Toileting Status:   Short Term Goal: Pt will perform toilet hygiene on BSC with Min A.  Long Term Goal: Pt will perform toilet hygiene on toilet with no AE with SBA.    Commode Transfer:   Short Term Goal: Pt will perform BSC t/f with Min A.  Long Term Goal:  Pt will perform toilet t/f in bathroom with SBA.     Bathing Status:   Long Term Goal: Pt will perform sponge bath with SBA with no unsafe fatigue.     Strength Status: 5/5 BUEs  Long Term Goal: Pt to perform BUE strengthening with weights and/or body weight to increase ADL independence and safety    Endurance Status:   Short Term Goal:pt to perform 15 min OT treatment with 5 or greater rest breaks  Long Term Goal: pt to perform 30 min OT treat with 3 or less rest breaks                          History:     Past Medical History:   Diagnosis Date    Arthritis     Asthma     Benign paroxysmal vertigo, bilateral     COPD (chronic obstructive pulmonary disease)     COVID     Diabetes mellitus     Diverticulitis     Hemorrhoids     Hypertension     Hypothyroidism, unspecified     Interstitial lung disease          Past Surgical History:   Procedure Laterality Date    CARDIAC CATHETERIZATION      cataract surgery Bilateral     CHOLECYSTECTOMY      COLONOSCOPY      ESOPHAGOGASTRODUODENOSCOPY      HYSTERECTOMY      INGUINAL HERNIA REPAIR         Time Tracking:     OT Date of Treatment: 01/19/24  OT Start Time: 0925  OT Stop Time: 0939  OT Total Time (min): 14 min    Billable Minutes:Evaluation Low  complexity    Renata Lynn, OTR/L, CSRS  1/19/2024

## 2024-01-19 NOTE — PLAN OF CARE
Problem: Adult Inpatient Plan of Care  Goal: Patient-Specific Goal (Individualized)  Outcome: Ongoing, Progressing     Problem: Diabetes Comorbidity  Goal: Blood Glucose Level Within Targeted Range  Outcome: Ongoing, Progressing  Intervention: Monitor and Manage Glycemia  Flowsheets (Taken 1/19/2024 3663)  Glycemic Management:   blood glucose monitored   supplemental insulin given     Problem: Fall Injury Risk  Goal: Absence of Fall and Fall-Related Injury  Outcome: Ongoing, Progressing  Intervention: Promote Injury-Free Environment  Flowsheets (Taken 1/19/2024 1097)  Safety Promotion/Fall Prevention:   assistive device/personal item within reach   side rails raised x 2   Fall Risk reviewed with patient/family

## 2024-01-19 NOTE — PLAN OF CARE
[facility] - Medical Surgical Unit  Initial Discharge Assessment       Primary Care Provider: Maria A, Primary Doctor    Admission Diagnosis: Generalized weakness [R53.1]    Admission Date: 1/18/2024  Expected Discharge Date:     Transition of Care Barriers: None    Payor: MEDICARE / Plan: MEDICARE PART A & B / Product Type: Government /     Extended Emergency Contact Information  Primary Emergency Contact: Clau Nair  Mobile Phone: [phone]  Relation: Daughter  Preferred language: English   needed? No  Secondary Emergency Contact: Iesha Willoughby  Mobile Phone: [phone]  Relation: Daughter  Preferred language: English   needed? No    Discharge Plan A: Home with family  Discharge Plan B: Home Health    No Pharmacies Listed    Initial Assessment (most recent)       Adult Discharge Assessment - 01/19/24 1322          Discharge Assessment    Assessment Type Discharge Planning Assessment     Confirmed/corrected address, phone number and insurance Yes     Confirmed Demographics Correct on Facesheet     Source of Information patient     Communicated CHELSEA with patient/caregiver Date not available/Unable to determine     Reason For Admission Generalized Weakness     People in Home child(bradly), adult     Facility Arrived From: [facility]     Do you expect to return to your current living situation? Yes     Do you have help at home or someone to help you manage your care at home? Yes     Coverage Medicare A & B     Do you have any problems affording any of your prescribed medications? No     Is the patient taking medications as prescribed? yes     Who is going to help you get home at discharge? daughters     How do you get to doctors appointments? family or friend will provide     Are you on dialysis? No     Do you take coumadin? No     Discharge Plan A Home with family     Discharge Plan B Home Health     DME Needed Upon Discharge  none     Discharge Plan  discussed with: Patient     Transition of Care Barriers None        Physical Activity    On average, how many days per week do you engage in moderate to strenuous exercise (like a brisk walk)? 0 days     On average, how many minutes do you engage in exercise at this level? 0 min        Financial Resource Strain    How hard is it for you to pay for the very basics like food, housing, medical care, and heating? Not hard at all        Housing Stability    In the last 12 months, was there a time when you were not able to pay the mortgage or rent on time? No     In the last 12 months, how many places have you lived? 1     In the last 12 months, was there a time when you did not have a steady place to sleep or slept in a shelter (including now)? No        Transportation Needs    In the past 12 months, has lack of transportation kept you from medical appointments or from getting medications? No     In the past 12 months, has lack of transportation kept you from meetings, work, or from getting things needed for daily living? No        Food Insecurity    Within the past 12 months, you worried that your food would run out before you got the money to buy more. Never true     Within the past 12 months, the food you bought just didn't last and you didn't have money to get more. Never true        Stress    Do you feel stress - tense, restless, nervous, or anxious, or unable to sleep at night because your mind is troubled all the time - these days? To some extent        Social Connections    In a typical week, how many times do you talk on the phone with family, friends, or neighbors? Three times a week     How often do you get together with friends or relatives? Twice a week     How often do you attend Jewish or Tenriism services? 1 to 4 times per year     Do you belong to any clubs or organizations such as Jewish groups, unions, fraternal or athletic groups, or school groups? No     How often do you attend meetings of the clubs  or organizations you belong to? Never     Are you , , , , never , or living with a partner?         Alcohol Use    Q1: How often do you have a drink containing alcohol? Never     Q2: How many drinks containing alcohol do you have on a typical day when you are drinking? Patient does not drink     Q3: How often do you have six or more drinks on one occasion? Never                              Ms. Owens lives at home and has support from her adult children and grand children.  She is unsure if she has home health, I will follow up with her daughter later.

## 2024-01-19 NOTE — PLAN OF CARE
Problem: Physical Therapy  Goal: Physical Therapy Goal  Description: Short-term Goals: 1.5 weeks  1. Patient will perform supine to/from sit with standby assist to improve independence and safety with bed mobility.  2. Patient will perform sit to/from stand with a rolling walker with contact guard assist to improve independence and safety with transfers.  3. Patient will ambulate 75 feet with a rolling walker with contact guard assist to improve independence and safety with gait.  4. Patient will tolerate 15 minutes of physical therapy intervention to improve endurance and activity tolerance.    Long-term goals: 3 weeks  1. Patient will perform supine to/from sit with modified independence to improve independence and safety with bed mobility.  2. Patient will perform sit to/from stand with a rolling walker with standby assist to improve independence and safety with transfers.  3. Patient will ambulate 150 feet with a rolling walker with standby assist to improve independence and safety with gait.  4. Patient will tolerate 30 minutes of physical therapy intervention to improve endurance and activity tolerance.    Outcome: Ongoing, Progressing

## 2024-01-19 NOTE — CONSULTS
[facility] - Medical Surgical Unit  Adult Nutrition  Consult Note    SUMMARY     Recommendations    Recommendation/Intervention: 1 D/C DM restricted diet to enc intake 2. Mechanical soft with ground meats with gravy, honor food prefs 3. D/C Boost Glucose control 4. One carton Boost Breeze daily 5. Magic mouthwash TID and prn  Goals: Meet EEN >75%, stabilize wt  Nutrition Goal Status: new  Communication of RD Recs: reviewed with RN    Assessment and Plan    PES: Involuntary WL R/T decreased intake/ acute illness AEB 7% recent WL       Malnutrition Assessment  Malnutrition Context: acute illness or injury  Malnutrition Level: moderate  Gums (Micronutrient): red (sore gums)  Teeth (Micronutrient): dentures  Neck/Chest (Micronutrient): bony prominence       Weight Loss (Malnutrition):  (7% in a month)  Energy Intake (Malnutrition): less than or equal to 50% for greater than or equal to 5 days   Orbital Region (Subcutaneous Fat Loss): mild depletion   Sabianism Region (Muscle Loss): mild depletion  Clavicle Bone Region (Muscle Loss): mild depletion                 Reason for Assessment    Reason For Assessment: consult, low Joshua (swing bed)  Diagnosis:  (Recent COVID, Flu, now with PNA, weakness)  Relevant Medical History: COPD, RA, DM, hypothyroidism, HTN, adv age  Interdisciplinary Rounds: did not attend  General Information Comments: RDN visited pt this a.m. and talked with pt and her daughter.  Prior to acute illness pt's wt was 118#.  Pt wear dentures on the bottom and they have rubbed her gums.  She has scattered dentition on the top.  She is very weak and needs assistance and encouragement with meals.  Pt has a hx of esophageal dilation and had this procedure ~6 months ago, pt c/o some nausea could be R/T ABX. Pt dislikes Boost Glucose control. RDN took Boost Breeze in for pt to try and she liked that better.  RDN took prefs and and communciated these to dietary dept.  Meal intake ~25.  BSS 17  "noted.  last BM   Nutrition Discharge Planning: Home with family with optimized intake.    Nutrition Risk Screen    Nutrition Risk Screen: difficulty chewing/swallowing, reduced oral intake over the last month (Insidious WL)    Nutrition/Diet History    Patient Reported Diet/Restrictions/Preferences: soft  Food Preferences: noted  Spiritual, Cultural Beliefs, Yarsanism Practices, Values that Affect Care: no  Food Allergies: NKFA  Factors Affecting Nutritional Intake: chewing difficulties/inability to chew food, decreased appetite, difficulty/impaired swallowing, sore mouth, nausea/vomiting    Anthropometrics    Temp: 97.7 °F (36.5 °C)  Height Method: Stated  Height: 5' 2" (157.5 cm)  Height (inches): 62 in  Weight Method: Bed Scale  Weight: 50.3 kg (111 lb)  Weight (lb): 111 lb  Ideal Body Weight (IBW), Female: 110 lb  % Ideal Body Weight, Female (lb): 100.91 %  BMI (Calculated): 20.3  BMI Grade: 18.5-24.9 - normal  Weight Loss: unintentional  Usual Body Weight (UBW), k kg  % Usual Body Weight: 93.43  % Weight Change From Usual Weight: -6.76 %       Lab/Procedures/Meds    Pertinent Labs Reviewed: reviewed  Pertinent Labs Comments: crt 1.12, B-304  Pertinent Medications Reviewed: reviewed  Pertinent Medications Comments: Synthroid, Levaquin, Vit C, MgOx, Omega 3, Vit D, ZnSO4           Estimated/Assessed Needs    Weight Used For Calorie Calculations: 50.3 kg (111 lb)  Energy Calorie Requirements (kcal): 3908-6213  Energy Need Method: Kcal/kg  Protein Requirements: 51-56  Weight Used For Protein Calculations: 50.3 kg (111 lb)     Estimated Fluid Requirement Method: other (see comments) (1511)  RDA Method (mL): 1259  CHO Requirement: 200 (Will order pt a regular diet to enc intake.)      Nutrition Prescription Ordered    Current Diet Order: Diabetic diet    Evaluation of Received Nutrient/Fluid Intake    Energy Calories Required: not meeting needs  Protein Required: not meeting needs  Fluid Required: not " meeting needs  Tolerance: not tolerating  % Intake of Estimated Energy Needs: 25 - 50 %  % Meal Intake: 25 - 50 %    Nutrition Risk    Level of Risk/Frequency of Follow-up: high       Monitor and Evaluation    Food and Nutrient Intake: food and beverage intake  Food and Nutrient Adminstration: diet order  Anthropometric Measurements: weight  Biochemical Data, Medical Tests and Procedures: electrolyte and renal panel, glucose/endocrine profile  Nutrition-Focused Physical Findings: overall appearance       Nutrition Follow-Up    RD Follow-up?: Yes

## 2024-01-19 NOTE — PT/OT/SLP EVAL
Physical Therapy Evaluation    Patient Name:  Jackelin Owens   MRN:  [id number]    Recommendations:     Discharge Recommendations: Low Intensity Therapy   Discharge Equipment Recommendations: other (see comments) (May need a rolling walker on discharge; PT will continue to assess.)   Barriers to discharge: Decreased caregiver support    Assessment:     Jackelin Owens is a 89 y.o. female admitted with a medical diagnosis of Generalized weakness. She presents with the following impairments/functional limitations: weakness, impaired endurance, impaired self care skills, impaired functional mobility, gait instability, impaired balance, decreased coordination, decreased lower extremity function, decreased safety awareness.    Rehab Prognosis: Good; patient would benefit from acute skilled PT services to address these deficits and reach maximum level of function.    Recent Surgery: * No surgery found *      Plan:     During this hospitalization, patient to be seen 5 x/week to address the identified rehab impairments via gait training, therapeutic activities, therapeutic exercises, neuromuscular re-education and progress toward the following goals:    Plan of Care Expires:  02/08/24    Subjective     Chief Complaint: feeling generally unwell  Patient/Family Comments/goals: Patient reports she does not feel like she is going to get better.   Pain/Comfort:  Pain Rating 1: 10/10  Location 1: chest (Patient reports this is chronic from cotton dust while working at [employer].)  Pain Rating Post-Intervention 1: 10/10    Patients cultural, spiritual, Sabianist conflicts given the current situation: no    Living Environment: Patient lives alone in a single story home with 2 sets of 3 steps with bilateral handrails to enter home. Prior to admission, patients level of function was modified independent with the use of a rollator. Equipment used at home: cane, quad, rollator. DME owned (not currently used): none.  Upon discharge,  patient will have assistance from her children as needed.    Objective:     Communicated with Renata Lynn OT prior to session. Patient found supine upon PT entry to room.    General Precautions: Standard, fall  Orthopedic Precautions:N/A   Braces: N/A  Respiratory Status: Room air    Exams:  Gross Motor Coordination:  WFL  Sensation:    -       Intact  RLE ROM: WFL  RLE Strength: Deficits: 4-/5  LLE ROM: WFL  LLE Strength: Deficits: 4-/5    Functional Mobility:  Bed Mobility:     Supine to Sit: contact guard assistance  Transfers:     Sit to Stand:  minimum assistance with rolling walker  Gait: x 16 feet with rolling walker with minimal assist; manual assist to keep rolling walker in motion; decreased step lengths with each foot not passing the other; slow eladio; easy fatigue; no loss of balance  Balance: contact guard assist with static standing balance with rolling walker; minimal assist with dynamic standing balance with rolling walker    AM-PAC 6 CLICK MOBILITY  Total Score:17     Treatment & Education:    Bed mobility, transfers, and gait performed as listed above. Patient educated on the physical therapy plan of care.     Patient left up in chair with chair alarm on.    GOALS:   Multidisciplinary Problems       Physical Therapy Goals          Problem: Physical Therapy    Goal Priority Disciplines Outcome Goal Variances Interventions   Physical Therapy Goal     PT, PT/OT Ongoing, Progressing     Description: Short-term Goals: 1.5 weeks  1. Patient will perform supine to/from sit with standby assist to improve independence and safety with bed mobility.  2. Patient will perform sit to/from stand with a rolling walker with contact guard assist to improve independence and safety with transfers.  3. Patient will ambulate 75 feet with a rolling walker with contact guard assist to improve independence and safety with gait.  4. Patient will tolerate 15 minutes of physical therapy intervention to improve endurance  and activity tolerance.    Long-term goals: 3 weeks  1. Patient will perform supine to/from sit with modified independence to improve independence and safety with bed mobility.  2. Patient will perform sit to/from stand with a rolling walker with standby assist to improve independence and safety with transfers.  3. Patient will ambulate 150 feet with a rolling walker with standby assist to improve independence and safety with gait.  4. Patient will tolerate 30 minutes of physical therapy intervention to improve endurance and activity tolerance.                       History:     Past Medical History:   Diagnosis Date    Arthritis     Asthma     Benign paroxysmal vertigo, bilateral     COPD (chronic obstructive pulmonary disease)     COVID     Diabetes mellitus     Diverticulitis     Hemorrhoids     Hypertension     Hypothyroidism, unspecified     Interstitial lung disease      Past Surgical History:   Procedure Laterality Date    CARDIAC CATHETERIZATION      cataract surgery Bilateral     CHOLECYSTECTOMY      COLONOSCOPY      ESOPHAGOGASTRODUODENOSCOPY      HYSTERECTOMY      INGUINAL HERNIA REPAIR       Time Tracking:     PT Received On: 01/19/24  PT Start Time: 1339     PT Stop Time: 1357  PT Total Time (min): 18 min     Billable Minutes: Evaluation (low complexity; 18 minutes)      01/19/2024

## 2024-01-20 LAB
GLUCOSE SERPL-MCNC: 149 MG/DL (ref 70–105)
GLUCOSE SERPL-MCNC: 195 MG/DL (ref 70–105)
GLUCOSE SERPL-MCNC: 218 MG/DL (ref 70–105)
GLUCOSE SERPL-MCNC: 254 MG/DL (ref 70–105)

## 2024-01-20 PROCEDURE — 99900035 HC TECH TIME PER 15 MIN (STAT)

## 2024-01-20 PROCEDURE — 25000003 PHARM REV CODE 250: Performed by: NURSE PRACTITIONER

## 2024-01-20 PROCEDURE — 63600175 PHARM REV CODE 636 W HCPCS: Performed by: FAMILY MEDICINE

## 2024-01-20 PROCEDURE — 82962 GLUCOSE BLOOD TEST: CPT

## 2024-01-20 PROCEDURE — 94761 N-INVAS EAR/PLS OXIMETRY MLT: CPT

## 2024-01-20 PROCEDURE — 93005 ELECTROCARDIOGRAM TRACING: CPT

## 2024-01-20 PROCEDURE — 27000982 HC MATTRESS, MATRIX LAL RENTAL

## 2024-01-20 PROCEDURE — 25000003 PHARM REV CODE 250: Performed by: FAMILY MEDICINE

## 2024-01-20 PROCEDURE — 27000944

## 2024-01-20 PROCEDURE — 11000004 HC SNF PRIVATE

## 2024-01-20 PROCEDURE — 93010 ELECTROCARDIOGRAM REPORT: CPT | Mod: ,,, | Performed by: HOSPITALIST

## 2024-01-20 RX ORDER — FLUTICASONE PROPIONATE 50 MCG
2 SPRAY, SUSPENSION (ML) NASAL DAILY
Status: DISCONTINUED | OUTPATIENT
Start: 2024-01-21 | End: 2024-02-06 | Stop reason: HOSPADM

## 2024-01-20 RX ORDER — LORAZEPAM 0.5 MG/1
0.5 TABLET ORAL ONCE
Status: COMPLETED | OUTPATIENT
Start: 2024-01-20 | End: 2024-01-20

## 2024-01-20 RX ADMIN — CHOLECALCIFEROL TAB 25 MCG (1000 UNIT) 2000 UNITS: 25 TAB at 09:01

## 2024-01-20 RX ADMIN — ENOXAPARIN SODIUM 30 MG: 30 INJECTION SUBCUTANEOUS at 04:01

## 2024-01-20 RX ADMIN — THEOPHYLLINE ANHYDROUS 200 MG: 200 CAPSULE, EXTENDED RELEASE ORAL at 09:01

## 2024-01-20 RX ADMIN — SENNOSIDES AND DOCUSATE SODIUM 1 TABLET: 8.6; 5 TABLET ORAL at 09:01

## 2024-01-20 RX ADMIN — OXYCODONE HYDROCHLORIDE AND ACETAMINOPHEN 1000 MG: 500 TABLET ORAL at 09:01

## 2024-01-20 RX ADMIN — ALUMINUM HYDROXIDE, MAGNESIUM HYDROXIDE, AND SIMETHICONE 30 ML: 200; 200; 20 SUSPENSION ORAL at 09:01

## 2024-01-20 RX ADMIN — LORAZEPAM 0.5 MG: 0.5 TABLET ORAL at 08:01

## 2024-01-20 RX ADMIN — LEVOFLOXACIN 500 MG: 500 TABLET, FILM COATED ORAL at 09:01

## 2024-01-20 RX ADMIN — LEVOTHYROXINE SODIUM 50 MCG: 50 TABLET ORAL at 06:01

## 2024-01-20 RX ADMIN — Medication 400 MG: at 09:01

## 2024-01-20 RX ADMIN — ZINC SULFATE 220 MG (50 MG) CAPSULE 220 MG: CAPSULE at 09:01

## 2024-01-20 RX ADMIN — ALUMINUM HYDROXIDE, MAGNESIUM HYDROXIDE, AND SIMETHICONE 30 ML: 200; 200; 20 SUSPENSION ORAL at 03:01

## 2024-01-20 RX ADMIN — LIDOCAINE HYDROCHLORIDE 15 ML: 20 SOLUTION ORAL at 09:01

## 2024-01-20 RX ADMIN — MUPIROCIN: 20 OINTMENT TOPICAL at 09:01

## 2024-01-20 RX ADMIN — INSULIN ASPART 3 UNITS: 100 INJECTION, SOLUTION INTRAVENOUS; SUBCUTANEOUS at 11:01

## 2024-01-20 RX ADMIN — OMEGA-3 FATTY ACIDS CAP 1000 MG 1 CAPSULE: 1000 CAP at 09:01

## 2024-01-20 RX ADMIN — ACETAMINOPHEN 650 MG: 325 TABLET ORAL at 10:01

## 2024-01-20 RX ADMIN — GABAPENTIN 100 MG: 100 CAPSULE ORAL at 06:01

## 2024-01-20 RX ADMIN — METOPROLOL TARTRATE 50 MG: 50 TABLET, FILM COATED ORAL at 09:01

## 2024-01-20 RX ADMIN — HYDRALAZINE HYDROCHLORIDE 25 MG: 25 TABLET ORAL at 09:01

## 2024-01-20 RX ADMIN — INSULIN ASPART 1 UNITS: 100 INJECTION, SOLUTION INTRAVENOUS; SUBCUTANEOUS at 10:01

## 2024-01-20 RX ADMIN — FAMOTIDINE 20 MG: 20 TABLET ORAL at 09:01

## 2024-01-20 RX ADMIN — CETIRIZINE HYDROCHLORIDE 10 MG: 10 TABLET, FILM COATED ORAL at 04:01

## 2024-01-20 RX ADMIN — LIDOCAINE HYDROCHLORIDE 15 ML: 20 SOLUTION ORAL at 03:01

## 2024-01-20 RX ADMIN — GABAPENTIN 100 MG: 100 CAPSULE ORAL at 10:01

## 2024-01-20 NOTE — PLAN OF CARE
Problem: Adult Inpatient Plan of Care  Goal: Patient-Specific Goal (Individualized)  Outcome: Ongoing, Progressing     Problem: Diabetes Comorbidity  Goal: Blood Glucose Level Within Targeted Range  Outcome: Ongoing, Progressing  Intervention: Monitor and Manage Glycemia  Flowsheets (Taken 1/20/2024 0456)  Glycemic Management:   blood glucose monitored   supplemental insulin given     Problem: Fall Injury Risk  Goal: Absence of Fall and Fall-Related Injury  Intervention: Identify and Manage Contributors  Flowsheets (Taken 1/20/2024 0236)  Medication Review/Management: medications reviewed

## 2024-01-20 NOTE — NURSING
Patient complaints of not being able to breath to respiratory therapy no distress noted o2 Sat 96% heart rate 80 therapy reported to NP order given for EKG, results to NP no new orders patient refused tylenol for pain

## 2024-01-21 LAB
GLUCOSE SERPL-MCNC: 153 MG/DL (ref 70–105)
GLUCOSE SERPL-MCNC: 190 MG/DL (ref 70–105)
GLUCOSE SERPL-MCNC: 242 MG/DL (ref 70–105)
GLUCOSE SERPL-MCNC: 272 MG/DL (ref 70–105)

## 2024-01-21 PROCEDURE — 99900035 HC TECH TIME PER 15 MIN (STAT)

## 2024-01-21 PROCEDURE — 94761 N-INVAS EAR/PLS OXIMETRY MLT: CPT

## 2024-01-21 PROCEDURE — 27000982 HC MATTRESS, MATRIX LAL RENTAL

## 2024-01-21 PROCEDURE — 25000003 PHARM REV CODE 250: Performed by: FAMILY MEDICINE

## 2024-01-21 PROCEDURE — 63600175 PHARM REV CODE 636 W HCPCS: Performed by: FAMILY MEDICINE

## 2024-01-21 PROCEDURE — 82962 GLUCOSE BLOOD TEST: CPT

## 2024-01-21 PROCEDURE — 25000003 PHARM REV CODE 250: Performed by: NURSE PRACTITIONER

## 2024-01-21 PROCEDURE — 25000242 PHARM REV CODE 250 ALT 637 W/ HCPCS: Performed by: NURSE PRACTITIONER

## 2024-01-21 PROCEDURE — 27000944

## 2024-01-21 PROCEDURE — 11000004 HC SNF PRIVATE

## 2024-01-21 RX ADMIN — MELATONIN TAB 3 MG 6 MG: 3 TAB at 09:01

## 2024-01-21 RX ADMIN — METOPROLOL TARTRATE 50 MG: 50 TABLET, FILM COATED ORAL at 08:01

## 2024-01-21 RX ADMIN — OXYCODONE HYDROCHLORIDE AND ACETAMINOPHEN 1000 MG: 500 TABLET ORAL at 08:01

## 2024-01-21 RX ADMIN — ENOXAPARIN SODIUM 30 MG: 30 INJECTION SUBCUTANEOUS at 04:01

## 2024-01-21 RX ADMIN — SENNOSIDES AND DOCUSATE SODIUM 1 TABLET: 8.6; 5 TABLET ORAL at 09:01

## 2024-01-21 RX ADMIN — ZINC SULFATE 220 MG (50 MG) CAPSULE 220 MG: CAPSULE at 08:01

## 2024-01-21 RX ADMIN — CHOLECALCIFEROL TAB 25 MCG (1000 UNIT) 2000 UNITS: 25 TAB at 08:01

## 2024-01-21 RX ADMIN — MUPIROCIN: 20 OINTMENT TOPICAL at 09:01

## 2024-01-21 RX ADMIN — FLUTICASONE PROPIONATE 100 MCG: 50 SPRAY, METERED NASAL at 08:01

## 2024-01-21 RX ADMIN — FAMOTIDINE 20 MG: 20 TABLET ORAL at 08:01

## 2024-01-21 RX ADMIN — LIDOCAINE HYDROCHLORIDE 15 ML: 20 SOLUTION ORAL at 08:01

## 2024-01-21 RX ADMIN — ALUMINUM HYDROXIDE, MAGNESIUM HYDROXIDE, AND SIMETHICONE 30 ML: 200; 200; 20 SUSPENSION ORAL at 08:01

## 2024-01-21 RX ADMIN — OMEGA-3 FATTY ACIDS CAP 1000 MG 1 CAPSULE: 1000 CAP at 08:01

## 2024-01-21 RX ADMIN — MUPIROCIN: 20 OINTMENT TOPICAL at 08:01

## 2024-01-21 RX ADMIN — SENNOSIDES AND DOCUSATE SODIUM 1 TABLET: 8.6; 5 TABLET ORAL at 08:01

## 2024-01-21 RX ADMIN — HYDRALAZINE HYDROCHLORIDE 25 MG: 25 TABLET ORAL at 09:01

## 2024-01-21 RX ADMIN — INSULIN ASPART 2 UNITS: 100 INJECTION, SOLUTION INTRAVENOUS; SUBCUTANEOUS at 11:01

## 2024-01-21 RX ADMIN — THEOPHYLLINE ANHYDROUS 200 MG: 200 CAPSULE, EXTENDED RELEASE ORAL at 08:01

## 2024-01-21 RX ADMIN — HYDRALAZINE HYDROCHLORIDE 25 MG: 25 TABLET ORAL at 08:01

## 2024-01-21 RX ADMIN — THEOPHYLLINE ANHYDROUS 200 MG: 200 CAPSULE, EXTENDED RELEASE ORAL at 09:01

## 2024-01-21 RX ADMIN — Medication 400 MG: at 08:01

## 2024-01-21 RX ADMIN — LEVOTHYROXINE SODIUM 50 MCG: 50 TABLET ORAL at 05:01

## 2024-01-21 RX ADMIN — OXYCODONE HYDROCHLORIDE AND ACETAMINOPHEN 1000 MG: 500 TABLET ORAL at 09:01

## 2024-01-21 RX ADMIN — INSULIN ASPART 3 UNITS: 100 INJECTION, SOLUTION INTRAVENOUS; SUBCUTANEOUS at 04:01

## 2024-01-21 NOTE — PLAN OF CARE
Problem: Adult Inpatient Plan of Care  Goal: Plan of Care Review  Outcome: Ongoing, Progressing  Goal: Patient-Specific Goal (Individualized)  Outcome: Ongoing, Progressing  Goal: Absence of Hospital-Acquired Illness or Injury  Outcome: Ongoing, Progressing  Goal: Optimal Comfort and Wellbeing  Outcome: Ongoing, Progressing  Goal: Readiness for Transition of Care  Outcome: Ongoing, Progressing     Problem: Diabetes Comorbidity  Goal: Blood Glucose Level Within Targeted Range  Outcome: Ongoing, Progressing     Problem: Skin Injury Risk Increased  Goal: Skin Health and Integrity  Outcome: Ongoing, Progressing     Problem: Fall Injury Risk  Goal: Absence of Fall and Fall-Related Injury  Outcome: Ongoing, Progressing     Problem: Oral Intake Inadequate  Goal: Improved Oral Intake  Outcome: Ongoing, Progressing     Problem: Malnutrition  Goal: Improved Nutritional Intake  Outcome: Ongoing, Progressing

## 2024-01-21 NOTE — PLAN OF CARE
Problem: Adult Inpatient Plan of Care  Goal: Plan of Care Review  Outcome: Ongoing, Progressing  Flowsheets (Taken 1/20/2024 2020)  Plan of Care Reviewed With: daughter  Goal: Patient-Specific Goal (Individualized)  Outcome: Ongoing, Progressing  Flowsheets (Taken 1/20/2024 2020)  Anxieties, Fears or Concerns: Anxiety related to current physical condition  Individualized Care Needs: To remain free from pain or discomfort x 24 hours

## 2024-01-21 NOTE — PROGRESS NOTES
"1800 Nurse reports that patient is having anxiety today and notes that it was reported this am that she did not sleep last night and has not taken much of nap today. Upon evaluation, Ms. Owens is alert and oriented. She says that is anxious and feels like a lump in her throat still. She has been receiving viscous lidocaine, and she reports that it does help her. Nurse reports that she has been swallowing her food and meds fine today. She reports that she just worries all of the time. Her daughter at bedside notes that she has always been a "worrier" and has had anxiety. Pt has no noted dyspnea. She denies chest pain. Lungs are clear bilat. She has no dependent or peripheral edema. Instructed pt that she needs to get some rest and try not to worry. Ativan 0.5mg po given.  "

## 2024-01-22 PROBLEM — B02.9 SHINGLES: Status: ACTIVE | Noted: 2024-01-22

## 2024-01-22 LAB
ANION GAP SERPL CALCULATED.3IONS-SCNC: 15 MMOL/L (ref 7–16)
BASOPHILS # BLD AUTO: 0.02 K/UL (ref 0–0.2)
BASOPHILS NFR BLD AUTO: 0.3 % (ref 0–1)
BUN SERPL-MCNC: 16 MG/DL (ref 7–18)
BUN/CREAT SERPL: 13 (ref 6–20)
CALCIUM SERPL-MCNC: 10 MG/DL (ref 8.5–10.1)
CHLORIDE SERPL-SCNC: 100 MMOL/L (ref 98–107)
CO2 SERPL-SCNC: 25 MMOL/L (ref 21–32)
CREAT SERPL-MCNC: 1.19 MG/DL (ref 0.55–1.02)
D DIMER PPP FEU-MCNC: 0.34 ΜG/ML (ref 0–0.47)
DIFFERENTIAL METHOD BLD: ABNORMAL
EGFR (NO RACE VARIABLE) (RUSH/TITUS): 44 ML/MIN/1.73M2
EOSINOPHIL # BLD AUTO: 0.1 K/UL (ref 0–0.5)
EOSINOPHIL NFR BLD AUTO: 1.4 % (ref 1–4)
EOSINOPHIL NFR BLD MANUAL: 1 % (ref 1–4)
ERYTHROCYTE [DISTWIDTH] IN BLOOD BY AUTOMATED COUNT: 13.5 % (ref 11.5–14.5)
GLUCOSE SERPL-MCNC: 232 MG/DL (ref 70–105)
GLUCOSE SERPL-MCNC: 236 MG/DL (ref 74–106)
GLUCOSE SERPL-MCNC: 355 MG/DL (ref 70–105)
HCT VFR BLD AUTO: 41.1 % (ref 38–47)
HGB BLD-MCNC: 13.7 G/DL (ref 12–16)
LYMPHOCYTES # BLD AUTO: 1.65 K/UL (ref 1–4.8)
LYMPHOCYTES NFR BLD AUTO: 22.8 % (ref 27–41)
LYMPHOCYTES NFR BLD MANUAL: 25 % (ref 27–41)
MCH RBC QN AUTO: 28.2 PG (ref 27–31)
MCHC RBC AUTO-ENTMCNC: 33.3 G/DL (ref 32–36)
MCV RBC AUTO: 84.7 FL (ref 80–96)
MONOCYTES # BLD AUTO: 1.32 K/UL (ref 0–0.8)
MONOCYTES NFR BLD AUTO: 18.2 % (ref 2–6)
MONOCYTES NFR BLD MANUAL: 15 % (ref 2–6)
MPC BLD CALC-MCNC: 10.9 FL (ref 9.4–12.4)
NEUTROPHILS # BLD AUTO: 4.16 K/UL (ref 1.8–7.7)
NEUTROPHILS NFR BLD AUTO: 57.3 % (ref 53–65)
NEUTS BAND NFR BLD MANUAL: 5 % (ref 1–5)
NEUTS SEG NFR BLD MANUAL: 54 % (ref 50–62)
NRBC BLD MANUAL-RTO: ABNORMAL %
NT-PROBNP SERPL-MCNC: 236 PG/ML (ref 1–450)
PLATELET # BLD AUTO: 277 K/UL (ref 150–400)
PLATELET MORPHOLOGY: NORMAL
POTASSIUM SERPL-SCNC: 3.7 MMOL/L (ref 3.5–5.1)
RBC # BLD AUTO: 4.85 M/UL (ref 4.2–5.4)
RBC MORPH BLD: NORMAL
SODIUM SERPL-SCNC: 136 MMOL/L (ref 136–145)
TROPONIN I SERPL DL<=0.01 NG/ML-MCNC: 119.4 PG/ML
TROPONIN I SERPL DL<=0.01 NG/ML-MCNC: 131.6 PG/ML
TROPONIN I SERPL DL<=0.01 NG/ML-MCNC: 9.6 PG/ML
WBC # BLD AUTO: 7.25 K/UL (ref 4.5–11)

## 2024-01-22 PROCEDURE — 83880 ASSAY OF NATRIURETIC PEPTIDE: CPT | Performed by: NURSE PRACTITIONER

## 2024-01-22 PROCEDURE — 25000003 PHARM REV CODE 250: Performed by: NURSE PRACTITIONER

## 2024-01-22 PROCEDURE — 93005 ELECTROCARDIOGRAM TRACING: CPT

## 2024-01-22 PROCEDURE — 25000003 PHARM REV CODE 250: Performed by: FAMILY MEDICINE

## 2024-01-22 PROCEDURE — 25500020 PHARM REV CODE 255: Performed by: FAMILY MEDICINE

## 2024-01-22 PROCEDURE — 97535 SELF CARE MNGMENT TRAINING: CPT

## 2024-01-22 PROCEDURE — 97110 THERAPEUTIC EXERCISES: CPT

## 2024-01-22 PROCEDURE — 94761 N-INVAS EAR/PLS OXIMETRY MLT: CPT

## 2024-01-22 PROCEDURE — 63600175 PHARM REV CODE 636 W HCPCS: Performed by: NURSE PRACTITIONER

## 2024-01-22 PROCEDURE — 84484 ASSAY OF TROPONIN QUANT: CPT | Performed by: NURSE PRACTITIONER

## 2024-01-22 PROCEDURE — 97110 THERAPEUTIC EXERCISES: CPT | Mod: CQ

## 2024-01-22 PROCEDURE — 93010 ELECTROCARDIOGRAM REPORT: CPT | Mod: 76,,, | Performed by: HOSPITALIST

## 2024-01-22 PROCEDURE — 27000944

## 2024-01-22 PROCEDURE — 97530 THERAPEUTIC ACTIVITIES: CPT

## 2024-01-22 PROCEDURE — 93010 ELECTROCARDIOGRAM REPORT: CPT | Mod: ,,, | Performed by: HOSPITALIST

## 2024-01-22 PROCEDURE — 63600175 PHARM REV CODE 636 W HCPCS: Performed by: FAMILY MEDICINE

## 2024-01-22 PROCEDURE — 25000242 PHARM REV CODE 250 ALT 637 W/ HCPCS: Performed by: FAMILY MEDICINE

## 2024-01-22 PROCEDURE — 85025 COMPLETE CBC W/AUTO DIFF WBC: CPT | Performed by: NURSE PRACTITIONER

## 2024-01-22 PROCEDURE — 27000982 HC MATTRESS, MATRIX LAL RENTAL

## 2024-01-22 PROCEDURE — 99900035 HC TECH TIME PER 15 MIN (STAT)

## 2024-01-22 PROCEDURE — 99309 SBSQ NF CARE MODERATE MDM 30: CPT | Mod: ,,, | Performed by: NURSE PRACTITIONER

## 2024-01-22 PROCEDURE — 94640 AIRWAY INHALATION TREATMENT: CPT

## 2024-01-22 PROCEDURE — 11000004 HC SNF PRIVATE

## 2024-01-22 PROCEDURE — 85379 FIBRIN DEGRADATION QUANT: CPT | Performed by: NURSE PRACTITIONER

## 2024-01-22 PROCEDURE — 82962 GLUCOSE BLOOD TEST: CPT

## 2024-01-22 PROCEDURE — 80048 BASIC METABOLIC PNL TOTAL CA: CPT | Performed by: NURSE PRACTITIONER

## 2024-01-22 RX ORDER — FUROSEMIDE 10 MG/ML
10 INJECTION INTRAMUSCULAR; INTRAVENOUS
Status: COMPLETED | OUTPATIENT
Start: 2024-01-22 | End: 2024-01-22

## 2024-01-22 RX ORDER — METOPROLOL TARTRATE 25 MG/1
12.5 TABLET ORAL ONCE
Status: COMPLETED | OUTPATIENT
Start: 2024-01-22 | End: 2024-01-22

## 2024-01-22 RX ORDER — VALACYCLOVIR HYDROCHLORIDE 500 MG/1
1000 TABLET, FILM COATED ORAL 3 TIMES DAILY
Status: DISCONTINUED | OUTPATIENT
Start: 2024-01-22 | End: 2024-01-22

## 2024-01-22 RX ORDER — INSULIN ASPART 100 [IU]/ML
0-5 INJECTION, SOLUTION INTRAVENOUS; SUBCUTANEOUS
Status: DISCONTINUED | OUTPATIENT
Start: 2024-01-22 | End: 2024-01-23

## 2024-01-22 RX ORDER — VALACYCLOVIR HYDROCHLORIDE 500 MG/1
1000 TABLET, FILM COATED ORAL EVERY 24 HOURS
Status: DISCONTINUED | OUTPATIENT
Start: 2024-01-23 | End: 2024-01-26

## 2024-01-22 RX ORDER — LORAZEPAM 0.5 MG/1
0.5 TABLET ORAL EVERY 6 HOURS PRN
Status: DISCONTINUED | OUTPATIENT
Start: 2024-01-22 | End: 2024-01-30

## 2024-01-22 RX ORDER — IPRATROPIUM BROMIDE AND ALBUTEROL SULFATE 2.5; .5 MG/3ML; MG/3ML
3 SOLUTION RESPIRATORY (INHALATION) EVERY 4 HOURS PRN
Status: DISCONTINUED | OUTPATIENT
Start: 2024-01-22 | End: 2024-02-06 | Stop reason: HOSPADM

## 2024-01-22 RX ORDER — CETIRIZINE HYDROCHLORIDE 5 MG/1
5 TABLET ORAL DAILY PRN
Status: DISCONTINUED | OUTPATIENT
Start: 2024-01-23 | End: 2024-01-30

## 2024-01-22 RX ADMIN — ALUMINUM HYDROXIDE, MAGNESIUM HYDROXIDE, AND SIMETHICONE 30 ML: 200; 200; 20 SUSPENSION ORAL at 08:01

## 2024-01-22 RX ADMIN — ACETAMINOPHEN 650 MG: 325 TABLET ORAL at 04:01

## 2024-01-22 RX ADMIN — CETIRIZINE HYDROCHLORIDE 10 MG: 10 TABLET, FILM COATED ORAL at 08:01

## 2024-01-22 RX ADMIN — FUROSEMIDE 10 MG: 10 INJECTION, SOLUTION INTRAMUSCULAR; INTRAVENOUS at 08:01

## 2024-01-22 RX ADMIN — ZINC SULFATE 220 MG (50 MG) CAPSULE 220 MG: CAPSULE at 08:01

## 2024-01-22 RX ADMIN — OXYCODONE HYDROCHLORIDE AND ACETAMINOPHEN 1000 MG: 500 TABLET ORAL at 08:01

## 2024-01-22 RX ADMIN — LORAZEPAM 0.5 MG: 0.5 TABLET ORAL at 08:01

## 2024-01-22 RX ADMIN — INSULIN ASPART 3 UNITS: 100 INJECTION, SOLUTION INTRAVENOUS; SUBCUTANEOUS at 09:01

## 2024-01-22 RX ADMIN — FAMOTIDINE 20 MG: 20 TABLET ORAL at 08:01

## 2024-01-22 RX ADMIN — SENNOSIDES AND DOCUSATE SODIUM 1 TABLET: 8.6; 5 TABLET ORAL at 08:01

## 2024-01-22 RX ADMIN — INSULIN ASPART 5 UNITS: 100 INJECTION, SOLUTION INTRAVENOUS; SUBCUTANEOUS at 05:01

## 2024-01-22 RX ADMIN — VALACYCLOVIR HYDROCHLORIDE 1000 MG: 500 TABLET, FILM COATED ORAL at 08:01

## 2024-01-22 RX ADMIN — METOPROLOL TARTRATE 12.5 MG: 25 TABLET, FILM COATED ORAL at 01:01

## 2024-01-22 RX ADMIN — MUPIROCIN: 20 OINTMENT TOPICAL at 09:01

## 2024-01-22 RX ADMIN — MUPIROCIN: 20 OINTMENT TOPICAL at 08:01

## 2024-01-22 RX ADMIN — CHOLECALCIFEROL TAB 25 MCG (1000 UNIT) 2000 UNITS: 25 TAB at 08:01

## 2024-01-22 RX ADMIN — HYDRALAZINE HYDROCHLORIDE 25 MG: 25 TABLET ORAL at 08:01

## 2024-01-22 RX ADMIN — LEVOTHYROXINE SODIUM 50 MCG: 50 TABLET ORAL at 06:01

## 2024-01-22 RX ADMIN — METHYLPREDNISOLONE SODIUM SUCCINATE 60 MG: 40 INJECTION, POWDER, FOR SOLUTION INTRAMUSCULAR; INTRAVENOUS at 09:01

## 2024-01-22 RX ADMIN — Medication 400 MG: at 08:01

## 2024-01-22 RX ADMIN — METHYLPREDNISOLONE SODIUM SUCCINATE 60 MG: 40 INJECTION, POWDER, FOR SOLUTION INTRAMUSCULAR; INTRAVENOUS at 01:01

## 2024-01-22 RX ADMIN — METOPROLOL TARTRATE 50 MG: 50 TABLET, FILM COATED ORAL at 08:01

## 2024-01-22 RX ADMIN — INSULIN DETEMIR 5 UNITS: 100 INJECTION, SOLUTION SUBCUTANEOUS at 08:01

## 2024-01-22 RX ADMIN — OMEGA-3 FATTY ACIDS CAP 1000 MG 1 CAPSULE: 1000 CAP at 08:01

## 2024-01-22 RX ADMIN — INSULIN ASPART 2 UNITS: 100 INJECTION, SOLUTION INTRAVENOUS; SUBCUTANEOUS at 11:01

## 2024-01-22 RX ADMIN — FLUTICASONE PROPIONATE 100 MCG: 50 SPRAY, METERED NASAL at 08:01

## 2024-01-22 RX ADMIN — ENOXAPARIN SODIUM 30 MG: 30 INJECTION SUBCUTANEOUS at 05:01

## 2024-01-22 RX ADMIN — IOPAMIDOL 100 ML: 755 INJECTION, SOLUTION INTRAVENOUS at 06:01

## 2024-01-22 RX ADMIN — THEOPHYLLINE ANHYDROUS 200 MG: 200 CAPSULE, EXTENDED RELEASE ORAL at 08:01

## 2024-01-22 RX ADMIN — ALBUTEROL SULFATE 2.5 MG: 2.5 SOLUTION RESPIRATORY (INHALATION) at 12:01

## 2024-01-22 RX ADMIN — GABAPENTIN 100 MG: 100 CAPSULE ORAL at 07:01

## 2024-01-22 NOTE — PT/OT/SLP PROGRESS
"Physical Therapy Treatment    Patient Name:  Jackelin Owens   MRN:  [id number]    Recommendations:     Discharge Recommendations: Low Intensity Therapy  Discharge Equipment Recommendations: other (see comments) (May need a rolling walker on discharge; PT will continue to assess.)  Barriers to discharge: Decreased caregiver support    Assessment:     Jackelin Owens is a 89 y.o. female admitted with a medical diagnosis of Generalized weakness.  She presents with the following impairments/functional limitations: weakness, impaired endurance, impaired self care skills, impaired functional mobility, gait instability, impaired balance, decreased coordination, decreased lower extremity function, decreased safety awareness Patient was very fatigued throughout treatment and required being woken up multiple times. Patient performed two different exercises with two reps of ten each before reporting her chest hurting as well as having difficulty breathing. Therapist allowed patient to rest and patient's oxygen was checked with her levels being normal. Therapist attempted to perform another exercise with patient, but patient was very fatigued and reported "I can not do anymore".    Rehab Prognosis: Fair; patient would benefit from acute skilled PT services to address these deficits and reach maximum level of function.    Recent Surgery: * No surgery found *      Plan:     During this hospitalization, patient to be seen 5 x/week to address the identified rehab impairments via gait training, therapeutic activities, therapeutic exercises, neuromuscular re-education and progress toward the following goals:    Plan of Care Expires:  02/08/24    Subjective     Chief Complaint: feeling unwell   Patient/Family Comments/goals: return back home   Pain/Comfort:  Pain Rating 1:  (Patient reported chest pain, but was unable to rate pain.)      Objective:     Communicated with OT prior to session.  Patient found  up in chair with OT  with " "peripheral IV upon PT entry to room.     General Precautions: Standard, fall  Orthopedic Precautions: N/A  Braces: N/A  Respiratory Status: Room air     Functional Mobility:  Gait: no transfers or ambulation due to safety reasons, patient was very drowsy and hard to stay awake throughout entire treatment      AM-PAC 6 CLICK MOBILITY  Turning over in bed (including adjusting bedclothes, sheets and blankets)?: 3  Sitting down on and standing up from a chair with arms (e.g., wheelchair, bedside commode, etc.): 3  Moving from lying on back to sitting on the side of the bed?: 3  Moving to and from a bed to a chair (including a wheelchair)?: 3  Need to walk in hospital room?: 3  Climbing 3-5 steps with a railing?: 2  Basic Mobility Total Score: 17       Treatment & Education:  Ankle pumps - 20 reps   Long arc quads - 20 reps   Attempted hip abduction/adduction - 5 reps before patient stopped and refused anymore, "I can not do anymore."    Patient left up in chair with call button in reach and chair alarm on..    GOALS:   Multidisciplinary Problems       Physical Therapy Goals          Problem: Physical Therapy    Goal Priority Disciplines Outcome Goal Variances Interventions   Physical Therapy Goal     PT, PT/OT Ongoing, Progressing     Description: Short-term Goals: 1.5 weeks  1. Patient will perform supine to/from sit with standby assist to improve independence and safety with bed mobility.  2. Patient will perform sit to/from stand with a rolling walker with contact guard assist to improve independence and safety with transfers.  3. Patient will ambulate 75 feet with a rolling walker with contact guard assist to improve independence and safety with gait.  4. Patient will tolerate 15 minutes of physical therapy intervention to improve endurance and activity tolerance.    Long-term goals: 3 weeks  1. Patient will perform supine to/from sit with modified independence to improve independence and safety with bed " mobility.  2. Patient will perform sit to/from stand with a rolling walker with standby assist to improve independence and safety with transfers.  3. Patient will ambulate 150 feet with a rolling walker with standby assist to improve independence and safety with gait.  4. Patient will tolerate 30 minutes of physical therapy intervention to improve endurance and activity tolerance.                         Time Tracking:     PT Received On: 01/22/24  PT Start Time: 1031     PT Stop Time: 1054  PT Total Time (min): 23 min     Billable Minutes: Therapeutic Exercise 23    Treatment Type: Treatment  PT/PTA: PTA     Number of PTA visits since last PT visit: 1   GILBERTO Edmond   01/22/2024

## 2024-01-22 NOTE — HOSPITAL COURSE
01/22/24 Awake and resting in bed. States she does not want to have anymore labs drawn, states she was stuck multiple times this morning and she cannot handle it. Complains of pain left flank radiating to back. States this started yesterday. States pain is at level 10. States area is itchy and painful. Noted small cluster rash left mammary fold- no blisters yet. Will start gabapentin and valcyclovir. Chest is clear. Sat on room air is 95%.   1200 reported to me that Mrs. Owens was complaining of chest pain and that she was tachycardic at rate of 127. Went to room to see her. She complained of having chest pain. States chest feels heavy. Asked her when this pain began and she states it has been ongoing. Asked her if she had this pain yesterday, she replied she did. Asked her if she had it last week, she again replied that she did- that it has been ongoing. She also complains of having back pain as well -left upper back. She complains of needing to have BM but states she cannot. Complains that she has to urinate. Took her to bathroom , she urinated and had small BM. EKG and troponin ordered. EKG shows sinus tachycardia. Troponin normal at 9.6.   1235 Dr. Alexis here to assess pt and heard expiratory wheezing. Chest xray pending. Duonebs ordered prn. Solumedrol 60 mg IV every 8 hours ordered. Sat on room air is 97%.    1250 Mrs. Owens states she is feeling better. Sat is 97%.    1300 Went back to check on Mrs. Owens and she states she cannot breath. Sat is 97%. Chest xray pending. BNP pending .Telemetry shows sinus tachycardia with rate from 120s to 130s.  States she just wants to die. To just let her die.     1335 Sat continues to be 97% on room air. Heart rate 130s- will give metoprolol po and monitor.     1345 Daughter Clau Nair is in room and Mrs. Campos is much more calm. Heart rate down to 114. Continue to monitor on telemetry.      01/23/24 Awake and resting in bed. Complains of being hungry this  "morning. Blood glucose increased to over 300- increased levemir to 10 units nightly. Will decrease steroids. No complaints of SOB or chest pain this morning. Does report pain to left flank radiating to back. Continue valcyclovir.      01/23/24 nurse reports poct glucose over 500 - will have lab to verify- increase to moderate SSI. Levemir increased to 10 units nightly. Methylprednisone dcd      01/24/24 Awake and complaining of frontal headache. Tylenol given for relief. Blood glucose this morning is 115.continue levemir and mod SSI.     01/26/24 Awake and sitting up in bed . States she is feeling better this morning. Walked 40 feet with therapist yesterday. Complains of dry skin. Will give moisturizing lotion to use prn.      01/29/24 Awake and smiling. Mood improved. States she had really good day yesterday. Appetite has improved. Continue to work with therapy on strengthening      01/30/24 Awake and sitting on side of the bed. Complained od felling "woozy headed and itching." No rash noted but scratch marks seen to right posterior flank. VS stable. Applie moisturizing lotion and she states skin felt much better. Review of meds shows that she had lorazepam last night which could be causing her symptoms of felling " woozy."  Will add atarax for itcing prn.      02/01/24 Awake and resting in bed. Complains of having occasional productive cough. Reports that she worked with therapy yesterday. Complained about sitting up in chair too long. Encouraged her to continue to work with therapy - she walked 30 feet twice yesterday. Encouraged her to sit up as therapy requests because that will strengthen her as well. States she will do so.      02/05/24 Awake and resting in bed. Reports she is feeling much better. Smiling this morning. She walked 220 feet with therapist Friday. Probable dc soon. States she will probably be going home with one of her daughters for a while then back to her home.       02/06/24 DC home with home " health with PT and OT to follow. Medications reconciled. Spoke with daughter Cecily Rivers re home dose of metformin 500 mg po BID. Make follow up appt with PCP Chaparrita HEWITT for follow up.

## 2024-01-22 NOTE — PROGRESS NOTES
[facility] - Medical Surgical Unit  Hospital Medicine  Progress Note    Patient Name: Jackelin Owens  MRN: [id number]  Patient Class: IP- Swing   Admission Date: 1/18/2024  Length of Stay: 4 days  Attending Physician: Home Alexis IV, DO  Primary Care Provider: Maria A, Primary Doctor        Subjective:     Principal Problem:Generalized weakness        HPI:  Jackelin Owens is an 89 year old female with pmh of hypertension, type 2 diabetes mellitus, hypothyroidism and interstitial lung disease.Records show that she was diagnosed with covid and flu approx 2 1/2 weeks ago. She was not prescribed tamiflu or paxlovid but was given an antibiotic. She presented to ER at [facility] with increasing SOB on 01/14/2024. Admitted and treated for covid 19 pneumonia. She tested positive for covid while in [facility] ER. She was treated with Levaquin and remdesevir, supplemental o2 . She has been working with therapy on strengthening but continues to have weakness. She has been accepted to [facility] for swing bed placement       01/18/24 VS on arrival are T98.1-87-/80 sat 95% room air. She is awake, alert, oriented. Accompanied by family members. Complains of occasional productive cough, weakness and poor appetite. Discussed code status and she chose DNI.    Overview/Hospital Course:  01/22/24 Awake and resting in bed. States she does not want to have anymore labs drawn, states she was stuck multiple times this morning and she cannot handle it. Complains of pain left flank radiating to back. States this started yesterday. States pain is at level 10. States area is itchy and painful. Noted small cluster rash left mammary fold- no blisters yet. Will start gabapentin and valcyclovir. Chest is clear. Sat on room air is 95%.     Interval History: weakness shingles left mammary fold    Review of Systems   Constitutional:  Positive for fatigue. Negative for appetite change and fever.        Reports poor appetite for  past week   HENT:  Negative for sore throat and trouble swallowing.    Eyes:  Negative for redness.   Respiratory:  Negative for cough, choking, chest tightness and shortness of breath.    Gastrointestinal:  Negative for abdominal distention, abdominal pain, constipation, diarrhea, nausea and vomiting.        Reports having slight nausea when she thinks about eating - for past week    Genitourinary:  Negative for dysuria.   Musculoskeletal:  Positive for back pain. Negative for arthralgias, neck pain and neck stiffness.   Skin:  Negative for pallor and wound.        Complains of pain and itching to left flank-states pain is level 10   Neurological:  Negative for light-headedness and headaches.        Pain to left flank radiating to back- complains of itching and pain level of 10     Objective:     Vital Signs (Most Recent):  Temp: 97.5 °F (36.4 °C) (01/22/24 0730)  Pulse: 94 (01/22/24 0730)  Resp: 18 (01/22/24 0730)  BP: (!) 149/66 (01/22/24 0730)  SpO2: 95 % (01/22/24 0730) Vital Signs (24h Range):  Temp:  [97.5 °F (36.4 °C)-98.7 °F (37.1 °C)] 97.5 °F (36.4 °C)  Pulse:  [81-96] 94  Resp:  [18] 18  SpO2:  [87 %-98 %] 95 %  BP: (102-149)/(57-76) 149/66     Weight: 50.3 kg (111 lb)  Body mass index is 20.3 kg/m².    Intake/Output Summary (Last 24 hours) at 1/22/2024 0842  Last data filed at 1/21/2024 1636  Gross per 24 hour   Intake 480 ml   Output --   Net 480 ml         Physical Exam  HENT:      Head: Normocephalic.      Mouth/Throat:      Mouth: Mucous membranes are moist.   Cardiovascular:      Rate and Rhythm: Normal rate and regular rhythm.      Pulses: Normal pulses.      Heart sounds: No murmur heard.     No friction rub. No gallop.   Pulmonary:      Effort: Pulmonary effort is normal. No respiratory distress.      Breath sounds: No wheezing or rhonchi.      Comments: Sat on room air is 95%  Abdominal:      General: Bowel sounds are normal. There is no distension.      Palpations: Abdomen is soft. There is no  mass.      Tenderness: There is no abdominal tenderness. There is no guarding or rebound.      Hernia: No hernia is present.   Musculoskeletal:         General: Normal range of motion.      Cervical back: Normal range of motion.   Skin:     General: Skin is warm and dry.      Coloration: Skin is not pale.      Findings: No erythema, lesion or rash.      Comments: Small area shingles to left mammary fold- not yet blister stage   Neurological:      Mental Status: She is alert and oriented to person, place, and time.      Motor: Weakness present.   Psychiatric:         Mood and Affect: Mood normal.             Significant Labs: All pertinent labs within the past 24 hours have been reviewed.  Recent Lab Results         01/21/24 2009 01/21/24  1617   01/21/24  1109        POC Glucose 190   272   242               Significant Imaging: I have reviewed all pertinent imaging results/findings within the past 24 hours.    Assessment/Plan:      * Generalized weakness  01/18/24 PT and OT to evaluate and treat   Reocrds show that she lives alone. She is household ambulator with rollator.      01/22/24 walked 16 feet with therapist during last session, continue therapy for strengthening    Pneumonia due to COVID-19 virus  01/18/24 has recd levaquin and remdesevir for left lower lobe pneumonia       Initiate standard COVID protocols; COVID-19 testing ,Infection Control notification  and isolation- respiratory, contact and droplet per protocol        Management: Inhaled bronchodilators as needed for shortness of breath.  Supplemental o2 as needed     Advance Care Planning Current advance care plan has been discussed with patient/family/POA and patient currently wishes DNR (Do Not Resuscitate).       Daughter states pt first tested positive for covid on 01/04  She is out of isolation window  O2 sat on room air is 95%    01/22/24 sat on room air is 95%- chest is clear     Type 2 diabetes mellitus  Home med is glimepiride 4 mg po  bid   Last A1c reviewed- ordered  Current correctional scale  Low  Maintain anti-hyperglycemic dose as follows-   Antihyperglycemics (From admission, onward)      Start     Stop Route Frequency Ordered    01/22/24 2100  insulin detemir U-100 injection 5 Units         -- SubQ Nightly 01/22/24 0848    01/18/24 1435  insulin aspart U-100 injection 0-5 Units         -- SubQ Before meals & nightly PRN 01/18/24 1435          Hold Oral hypoglycemics while patient is in the hospital.    Accuchecks ac and hs with low dose SSI coverage       01/22/24 add 5 units levemir at hs     Hypertension  01/18/24 continue lopressor 50 mg po daily     And apresoline 25 mg po BID      While in the hospital, will manage blood pressure as follows; Continue home antihypertensive regimen    Will utilize p.r.n. blood pressure medication only if patient's blood pressure greater than 180/110 and she develops symptoms such as worsening chest pain or shortness of breath.    Interstitial lung disease  01/18/24 continue theophylline 200 mg po BID       Hypothyroidism  01/18/24 continue levothyroxine 50 mcg daily      Poor appetite    01/18/24 diabetic diet   Ensure supplement TID and prn     GERD (gastroesophageal reflux disease)  01/18/24 continue famotidine        Shingles    01/22/24 start valacyclovir 1000 mg TID x 7 days  Start gabapentin      VTE Risk Mitigation (From admission, onward)           Ordered     enoxaparin injection 30 mg  Every 24 hours         01/19/24 0706     IP VTE LOW RISK PATIENT  Once         01/18/24 1434     Place CARLOS hose  Until discontinued         01/18/24 1434                    Discharge Planning   CHELSEA:      Code Status: Partial Code   Is the patient medically ready for discharge?:     Reason for patient still in hospital (select all that apply): Treatment  Discharge Plan A: Home with family                  MARCELINA Mir  Department of Hospital Medicine   [facility] - Medical Surgical Unit

## 2024-01-22 NOTE — PT/OT/SLP PROGRESS
Occupational Therapy   Treatment    Name: Jackelin Owens  MRN: [id number]  Admitting Diagnosis:  Generalized weakness       Recommendations:     Discharge Recommendations: Low Intensity Therapy  Discharge Equipment Recommendations:  other (see comments)  Barriers to discharge:  None    Assessment:     Jackelin Owens is a 89 y.o. female with a medical diagnosis of Generalized weakness.  She presents with weakness and decline with ADLs. Performance deficits affecting function are weakness, impaired endurance, impaired self care skills, impaired functional mobility, gait instability, impaired balance, decreased coordination, decreased lower extremity function, decreased safety awareness.     Rehab Prognosis:  Good; patient would benefit from acute skilled OT services to address these deficits and reach maximum level of function.       Plan:     Patient to be seen 5 x/week to address the above listed problems via self-care/home management, therapeutic activities, therapeutic exercises  Plan of Care Expires: 02/18/24  Plan of Care Reviewed with: daughter    Subjective     Chief Complaint: Pain and weakness  Patient/Family Comments/goals: to return to prior level of function  Pain/Comfort:  Pain Rating 1: 0/10  Pain Rating Post-Intervention 1: 0/10    Objective:     Communicated with: Nurse prior to session.  Patient found supine with peripheral IV upon OT entry to room.    General Precautions: Standard, fall    Orthopedic Precautions:N/A  Braces: N/A  Respiratory Status: Room air     Occupational Performance:     Bed Mobility:    Patient completed Rolling/Turning to Left with  minimum assistance  Patient completed Rolling/Turning to Right with minimum assistance  Patient completed Scooting/Bridging with minimum assistance  Patient completed Supine to Sit with minimum assistance     Functional Mobility/Transfers:  Patient completed Sit <> Stand Transfer with minimum assistance  with  rolling walker   Patient completed Bed  <> Chair Transfer using Step Transfer technique with minimum assistance with rolling walker  Functional Mobility: Patient transferred to chair with minimal assistance with no unsafe fatigue.    Activities of Daily Living:  Upper Body Dressing: minimum assistance to pennie hospital gown  Lower Body Dressing: moderate assistance to pennie socks      [facility] 6 Click ADL:      Treatment & Education:  Pt performed B UE strengthening exercises to include:   UE bike x 5 min  Shoulder flexion   Chest press    Elbow flexion   Elbow extension   Pectoral stretches   Bilateral rowing  All exercises performed 2x10 reps. Patient performed standing activity with clothes pin tree. Patient was lolita to pennie 10 1#/2# resistive pinch pins on pole with Min A with moderate fatigue and rest breaks. Patient also performed bilateral hand strengthening using gripper x 20 reps and yellow theraputty.     Patient left up in chair with all lines intact, call button in reach, and PTA present to complete physical therapy treatment.     GOALS:   Multidisciplinary Problems       Occupational Therapy Goals          Problem: Occupational Therapy    Goal Priority Disciplines Outcome Interventions   Occupational Therapy Goal     OT, PT/OT Ongoing, Progressing    Description: Grooming Status:   Short Term Goal: Pt will perform grooming with Min A sitting EOB.   Long Term Goal: Pt will perform grooming/oral hygiene standing at sink with SBA.      LE dressing Status:   Short Term Goal: Pt will perform LE dressing with Min A.   Long Term Goal: Pt will perform LE dressing with SBA.    Toileting Status:   Short Term Goal: Pt will perform toilet hygiene on BSC with Min A.  Long Term Goal: Pt will perform toilet hygiene on toilet with no AE with SBA.    Commode Transfer:   Short Term Goal: Pt will perform BSC t/f with Min A.  Long Term Goal:  Pt will perform toilet t/f in bathroom with SBA.     Bathing Status:   Long Term Goal: Pt will perform sponge bath with SBA  with no unsafe fatigue.     Strength Status: 5/5 BUEs  Long Term Goal: Pt to perform BUE strengthening with weights and/or body weight to increase ADL independence and safety    Endurance Status:   Short Term Goal:pt to perform 15 min OT treatment with 5 or greater rest breaks  Long Term Goal: pt to perform 30 min OT treat with 3 or less rest breaks                          Time Tracking:     OT Date of Treatment: 01/22/24  OT Start Time: 0945  OT Stop Time: 1029  OT Total Time (min): 44 min    Billable Minutes:Self Care/Home Management 14 min  Therapeutic Activity 15 min  Therapeutic Exercise 15 min      PARISA Thorpe/L, CSRS  OT/CUCA: OT          1/22/2024

## 2024-01-22 NOTE — PLAN OF CARE
Problem: Adult Inpatient Plan of Care  Goal: Patient-Specific Goal (Individualized)  Outcome: Ongoing, Progressing     Problem: Diabetes Comorbidity  Goal: Blood Glucose Level Within Targeted Range  Outcome: Ongoing, Progressing  Intervention: Monitor and Manage Glycemia  Flowsheets (Taken 1/22/2024 0415)  Glycemic Management:   blood glucose monitored   supplemental insulin given     Problem: Fall Injury Risk  Goal: Absence of Fall and Fall-Related Injury  Outcome: Ongoing, Progressing  Intervention: Promote Injury-Free Environment  Flowsheets (Taken 1/22/2024 0415)  Safety Promotion/Fall Prevention:   assistive device/personal item within reach   bed alarm set   chair alarm set   side rails raised x 2   instructed to call staff for mobility

## 2024-01-22 NOTE — ASSESSMENT & PLAN NOTE
Home med is glimepiride 4 mg po bid   Last A1c reviewed- ordered  Current correctional scale  Low  Maintain anti-hyperglycemic dose as follows-   Antihyperglycemics (From admission, onward)      Start     Stop Route Frequency Ordered    01/22/24 2100  insulin detemir U-100 injection 5 Units         -- SubQ Nightly 01/22/24 0848    01/18/24 1435  insulin aspart U-100 injection 0-5 Units         -- SubQ Before meals & nightly PRN 01/18/24 1435          Hold Oral hypoglycemics while patient is in the hospital.    Accuchecks ac and hs with low dose SSI coverage       01/22/24 add 5 units levemir at hs

## 2024-01-22 NOTE — ASSESSMENT & PLAN NOTE
01/18/24 has recd levaquin and remdesevir for left lower lobe pneumonia       Initiate standard COVID protocols; COVID-19 testing ,Infection Control notification  and isolation- respiratory, contact and droplet per protocol        Management: Inhaled bronchodilators as needed for shortness of breath.  Supplemental o2 as needed     Advance Care Planning  Current advance care plan has been discussed with patient/family/POA and patient currently wishes DNR (Do Not Resuscitate).       Daughter states pt first tested positive for covid on 01/04  She is out of isolation window  O2 sat on room air is 95%    01/22/24 sat on room air is 95%- chest is clear

## 2024-01-22 NOTE — ASSESSMENT & PLAN NOTE
01/18/24 PT and OT to evaluate and treat   Relu show that she lives alone. She is household ambulator with rollator.      01/22/24 walked 16 feet with therapist during last session, continue therapy for strengthening

## 2024-01-22 NOTE — SUBJECTIVE & OBJECTIVE
Interval History: weakness shingles left mammary fold    Review of Systems   Constitutional:  Positive for fatigue. Negative for appetite change and fever.        Reports poor appetite for past week   HENT:  Negative for sore throat and trouble swallowing.    Eyes:  Negative for redness.   Respiratory:  Negative for cough, choking, chest tightness and shortness of breath.    Gastrointestinal:  Negative for abdominal distention, abdominal pain, constipation, diarrhea, nausea and vomiting.        Reports having slight nausea when she thinks about eating - for past week    Genitourinary:  Negative for dysuria.   Musculoskeletal:  Positive for back pain. Negative for arthralgias, neck pain and neck stiffness.   Skin:  Negative for pallor and wound.        Complains of pain and itching to left flank-states pain is level 10   Neurological:  Negative for light-headedness and headaches.        Pain to left flank radiating to back- complains of itching and pain level of 10     Objective:     Vital Signs (Most Recent):  Temp: 97.5 °F (36.4 °C) (01/22/24 0730)  Pulse: 94 (01/22/24 0730)  Resp: 18 (01/22/24 0730)  BP: (!) 149/66 (01/22/24 0730)  SpO2: 95 % (01/22/24 0730) Vital Signs (24h Range):  Temp:  [97.5 °F (36.4 °C)-98.7 °F (37.1 °C)] 97.5 °F (36.4 °C)  Pulse:  [81-96] 94  Resp:  [18] 18  SpO2:  [87 %-98 %] 95 %  BP: (102-149)/(57-76) 149/66     Weight: 50.3 kg (111 lb)  Body mass index is 20.3 kg/m².    Intake/Output Summary (Last 24 hours) at 1/22/2024 0842  Last data filed at 1/21/2024 1636  Gross per 24 hour   Intake 480 ml   Output --   Net 480 ml         Physical Exam  HENT:      Head: Normocephalic.      Mouth/Throat:      Mouth: Mucous membranes are moist.   Cardiovascular:      Rate and Rhythm: Normal rate and regular rhythm.      Pulses: Normal pulses.      Heart sounds: No murmur heard.     No friction rub. No gallop.   Pulmonary:      Effort: Pulmonary effort is normal. No respiratory distress.      Breath  sounds: No wheezing or rhonchi.      Comments: Sat on room air is 95%  Abdominal:      General: Bowel sounds are normal. There is no distension.      Palpations: Abdomen is soft. There is no mass.      Tenderness: There is no abdominal tenderness. There is no guarding or rebound.      Hernia: No hernia is present.   Musculoskeletal:         General: Normal range of motion.      Cervical back: Normal range of motion.   Skin:     General: Skin is warm and dry.      Coloration: Skin is not pale.      Findings: No erythema, lesion or rash.      Comments: Small area shingles to left mammary fold- not yet blister stage   Neurological:      Mental Status: She is alert and oriented to person, place, and time.      Motor: Weakness present.   Psychiatric:         Mood and Affect: Mood normal.             Significant Labs: All pertinent labs within the past 24 hours have been reviewed.  Recent Lab Results         01/21/24 2009 01/21/24  1617   01/21/24  1109        POC Glucose 190   272   242               Significant Imaging: I have reviewed all pertinent imaging results/findings within the past 24 hours.

## 2024-01-23 LAB
ANION GAP SERPL CALCULATED.3IONS-SCNC: 11 MMOL/L (ref 7–16)
BUN SERPL-MCNC: 22 MG/DL (ref 7–18)
BUN/CREAT SERPL: 17 (ref 6–20)
CALCIUM SERPL-MCNC: 10.4 MG/DL (ref 8.5–10.1)
CHLORIDE SERPL-SCNC: 100 MMOL/L (ref 98–107)
CO2 SERPL-SCNC: 27 MMOL/L (ref 21–32)
CREAT SERPL-MCNC: 1.26 MG/DL (ref 0.55–1.02)
EGFR (NO RACE VARIABLE) (RUSH/TITUS): 41 ML/MIN/1.73M2
GLUCOSE SERPL-MCNC: 283 MG/DL (ref 70–105)
GLUCOSE SERPL-MCNC: 283 MG/DL (ref 70–105)
GLUCOSE SERPL-MCNC: 311 MG/DL (ref 70–105)
GLUCOSE SERPL-MCNC: 334 MG/DL (ref 74–106)
GLUCOSE SERPL-MCNC: 346 MG/DL (ref 70–105)
GLUCOSE SERPL-MCNC: 474 MG/DL (ref 70–105)
GLUCOSE SERPL-MCNC: 518 MG/DL (ref 70–105)
GLUCOSE SERPL-MCNC: 552 MG/DL (ref 74–106)
POTASSIUM SERPL-SCNC: 4.2 MMOL/L (ref 3.5–5.1)
SODIUM SERPL-SCNC: 134 MMOL/L (ref 136–145)
TROPONIN I SERPL DL<=0.01 NG/ML-MCNC: 132.2 PG/ML

## 2024-01-23 PROCEDURE — 97116 GAIT TRAINING THERAPY: CPT | Mod: CQ

## 2024-01-23 PROCEDURE — 99308 SBSQ NF CARE LOW MDM 20: CPT | Mod: ,,, | Performed by: FAMILY MEDICINE

## 2024-01-23 PROCEDURE — 25000003 PHARM REV CODE 250: Performed by: NURSE PRACTITIONER

## 2024-01-23 PROCEDURE — 11000004 HC SNF PRIVATE

## 2024-01-23 PROCEDURE — 97110 THERAPEUTIC EXERCISES: CPT | Mod: CQ

## 2024-01-23 PROCEDURE — 63600175 PHARM REV CODE 636 W HCPCS: Performed by: NURSE PRACTITIONER

## 2024-01-23 PROCEDURE — 27000982 HC MATTRESS, MATRIX LAL RENTAL

## 2024-01-23 PROCEDURE — 63600175 PHARM REV CODE 636 W HCPCS: Performed by: FAMILY MEDICINE

## 2024-01-23 PROCEDURE — 80048 BASIC METABOLIC PNL TOTAL CA: CPT | Performed by: NURSE PRACTITIONER

## 2024-01-23 PROCEDURE — 99900035 HC TECH TIME PER 15 MIN (STAT)

## 2024-01-23 PROCEDURE — 82947 ASSAY GLUCOSE BLOOD QUANT: CPT | Performed by: NURSE PRACTITIONER

## 2024-01-23 PROCEDURE — 84484 ASSAY OF TROPONIN QUANT: CPT | Performed by: NURSE PRACTITIONER

## 2024-01-23 PROCEDURE — 97110 THERAPEUTIC EXERCISES: CPT

## 2024-01-23 PROCEDURE — 27000944

## 2024-01-23 PROCEDURE — 97530 THERAPEUTIC ACTIVITIES: CPT

## 2024-01-23 PROCEDURE — 82962 GLUCOSE BLOOD TEST: CPT

## 2024-01-23 PROCEDURE — 97535 SELF CARE MNGMENT TRAINING: CPT

## 2024-01-23 PROCEDURE — 94761 N-INVAS EAR/PLS OXIMETRY MLT: CPT

## 2024-01-23 RX ORDER — GLUCAGON 1 MG
1 KIT INJECTION
Status: DISCONTINUED | OUTPATIENT
Start: 2024-01-23 | End: 2024-01-23 | Stop reason: SDUPTHER

## 2024-01-23 RX ORDER — INSULIN ASPART 100 [IU]/ML
0-10 INJECTION, SOLUTION INTRAVENOUS; SUBCUTANEOUS
Status: DISCONTINUED | OUTPATIENT
Start: 2024-01-23 | End: 2024-01-26

## 2024-01-23 RX ORDER — IBUPROFEN 200 MG
16 TABLET ORAL
Status: DISCONTINUED | OUTPATIENT
Start: 2024-01-23 | End: 2024-01-23 | Stop reason: SDUPTHER

## 2024-01-23 RX ORDER — IBUPROFEN 200 MG
24 TABLET ORAL
Status: DISCONTINUED | OUTPATIENT
Start: 2024-01-23 | End: 2024-01-23 | Stop reason: SDUPTHER

## 2024-01-23 RX ADMIN — METOPROLOL TARTRATE 50 MG: 50 TABLET, FILM COATED ORAL at 08:01

## 2024-01-23 RX ADMIN — FLUTICASONE PROPIONATE 100 MCG: 50 SPRAY, METERED NASAL at 08:01

## 2024-01-23 RX ADMIN — OMEGA-3 FATTY ACIDS CAP 1000 MG 1 CAPSULE: 1000 CAP at 08:01

## 2024-01-23 RX ADMIN — HYDRALAZINE HYDROCHLORIDE 25 MG: 25 TABLET ORAL at 08:01

## 2024-01-23 RX ADMIN — ENOXAPARIN SODIUM 30 MG: 30 INJECTION SUBCUTANEOUS at 05:01

## 2024-01-23 RX ADMIN — FAMOTIDINE 20 MG: 20 TABLET ORAL at 08:01

## 2024-01-23 RX ADMIN — CHOLECALCIFEROL TAB 25 MCG (1000 UNIT) 2000 UNITS: 25 TAB at 08:01

## 2024-01-23 RX ADMIN — MUPIROCIN: 20 OINTMENT TOPICAL at 08:01

## 2024-01-23 RX ADMIN — THEOPHYLLINE ANHYDROUS 200 MG: 200 CAPSULE, EXTENDED RELEASE ORAL at 08:01

## 2024-01-23 RX ADMIN — GABAPENTIN 100 MG: 100 CAPSULE ORAL at 06:01

## 2024-01-23 RX ADMIN — INSULIN ASPART 3 UNITS: 100 INJECTION, SOLUTION INTRAVENOUS; SUBCUTANEOUS at 08:01

## 2024-01-23 RX ADMIN — VALACYCLOVIR HYDROCHLORIDE 1000 MG: 500 TABLET, FILM COATED ORAL at 08:01

## 2024-01-23 RX ADMIN — INSULIN DETEMIR 10 UNITS: 100 INJECTION, SOLUTION SUBCUTANEOUS at 08:01

## 2024-01-23 RX ADMIN — OXYCODONE HYDROCHLORIDE AND ACETAMINOPHEN 1000 MG: 500 TABLET ORAL at 08:01

## 2024-01-23 RX ADMIN — INSULIN ASPART 3 UNITS: 100 INJECTION, SOLUTION INTRAVENOUS; SUBCUTANEOUS at 06:01

## 2024-01-23 RX ADMIN — Medication 400 MG: at 08:01

## 2024-01-23 RX ADMIN — LEVOTHYROXINE SODIUM 50 MCG: 50 TABLET ORAL at 05:01

## 2024-01-23 RX ADMIN — SENNOSIDES AND DOCUSATE SODIUM 1 TABLET: 8.6; 5 TABLET ORAL at 08:01

## 2024-01-23 RX ADMIN — ZINC SULFATE 220 MG (50 MG) CAPSULE 220 MG: CAPSULE at 08:01

## 2024-01-23 RX ADMIN — MELATONIN TAB 3 MG 6 MG: 3 TAB at 10:01

## 2024-01-23 RX ADMIN — INSULIN ASPART 10 UNITS: 100 INJECTION, SOLUTION INTRAVENOUS; SUBCUTANEOUS at 01:01

## 2024-01-23 RX ADMIN — METHYLPREDNISOLONE SODIUM SUCCINATE 60 MG: 40 INJECTION, POWDER, FOR SOLUTION INTRAMUSCULAR; INTRAVENOUS at 05:01

## 2024-01-23 NOTE — PLAN OF CARE
[facility] - Medical Surgical Unit - Swing Bed   Interdisciplinary Team Meeting    Patient: Jackelin Owens   Today's Date: 1/23/2024   Estimated D/C Date: 02/08/2024        Physician: Home Alexis MD Nurse Practitioner: Andreina Tomlinson NP   Pharmacy: Aleida Branstetter, PharmD Unit Director: GLENIS Arechiga   :  Janie Friedman RN Physical/Occupational Therapy:  José Manuel Davila OT   Speech Therapy: SAJI Activity Therapy: Heather Benavides LPN   Nursing: GLENIS Arechiga  Respiratory: Enzo Elias, RT Dietary: Claudio Friedman  Other:      Nursing  New Symptoms/Problems: See chart  Last Bowel Movement: 01/23/24   Urine: continent  Benjamin: No   Bowel: continent  Constipated: No  Diarrhea: No   Isolation: No  Cognition: WNL  Aspiration Precautions:No  Wound Care: No  Wound Location/Tx:   Comment(s):     Respiratory   O2 Device: Room Air  O2 Flow:   SpO2: 97%  Neb Tx: PRN  Comment(s):      Dietary  Nutrition:  Dysphagia Mechanical Soft w/Ground meats  Comment(s): Boost with Lunch    Speech Therapy  Speech/Swallowing: No current speech or swallowing issues  Comment(s):     Physical Therapy  Gait/Assistive Device: 16' W. RW w/ Min Asst ELOS: Plan to DC 2/8/2024    Transfers: Minimal Assistance  Bed Mobility: Contact Guard Assistance Range of Motion/Restrictions: NONE  Comment(s):      Occupational Therapy  Eating/Grooming: Minimal Assistance Toileting: Moderate Assistance to Maximum Asst   Bathing: Maximum Assistance Dressing (Upper Body): Moderate Assistance   Dressing (Lower Body): Maximum Assistance Comment(s):      Activity Therapy  Level of participation: Active participation  Comment(s):     Pharmacy  Medication Changes (see MD orders in chart): Yes  Labs Reviewed: Yes  New Lab Orders: Yes  Comment(s): Labs ordered every M/Th      Tx Plan/Recommendations reviewed with family and/or patient on (date) 01/19/2024.  Additional family Conference/Training:   D/C  Plan/Recommendations: Home with family  CHELSEA: 2/8/2024  Comment(s):

## 2024-01-23 NOTE — PT/OT/SLP PROGRESS
"Physical Therapy Treatment    Patient Name:  Jackelin Owens   MRN:  [id number]    Recommendations:     Discharge Recommendations: Low Intensity Therapy  Discharge Equipment Recommendations: other (see comments) (May need a rolling walker on discharge; PT will continue to assess.)  Barriers to discharge: Decreased caregiver support    Assessment:     Jackelin Owens is a 89 y.o. female admitted with a medical diagnosis of Generalized weakness.  She presents with the following impairments/functional limitations: weakness, impaired endurance, impaired self care skills, impaired functional mobility, gait instability, impaired balance, decreased coordination, decreased lower extremity function, decreased safety awareness Patient did require motivation in order to complete therapy today. Patient kept reporting "I can not", but was able to perform all task asked of her with assistance and motivation from therapist. Patient does become easily fatigued with ambulation, but progress was noted with her ambulation distance today.     Rehab Prognosis: Fair; patient would benefit from acute skilled PT services to address these deficits and reach maximum level of function.    Recent Surgery: * No surgery found *      Plan:     During this hospitalization, patient to be seen 5 x/week to address the identified rehab impairments via gait training, therapeutic activities, therapeutic exercises, neuromuscular re-education and progress toward the following goals:    Plan of Care Expires:  02/08/24    Subjective     Chief Complaint: feeling unwell   Patient/Family Comments/goals: return back home   Pain/Comfort:  Pain Rating 1: 0/10  Pain Rating Post-Intervention 1: 0/10      Objective:     Communicated with OT prior to session.  Patient found HOB elevated with peripheral IV upon PT entry to room.     General Precautions: Standard, fall  Orthopedic Precautions: N/A  Braces: N/A  Respiratory Status: Room air     Functional Mobility:  Bed " Mobility:     Supine to Sit: minimum assistance  Transfers:     Sit to Stand:  minimum assistance with rolling walker  Bed to Chair: minimum assistance with  rolling walker  using  Step Transfer  Gait: Patient ambulated 30' with min a and RW. Patient required manual assist to keep rolling walker in motion and ambulated with decreased step lengths. Patient ambulated with no LOB, but does become extremely fatigued with ambulation.      AM-PAC 6 CLICK MOBILITY  Turning over in bed (including adjusting bedclothes, sheets and blankets)?: 3  Sitting down on and standing up from a chair with arms (e.g., wheelchair, bedside commode, etc.): 3  Moving from lying on back to sitting on the side of the bed?: 3  Moving to and from a bed to a chair (including a wheelchair)?: 3  Need to walk in hospital room?: 3  Climbing 3-5 steps with a railing?: 2  Basic Mobility Total Score: 17       Treatment & Education:  Transfers and ambulation as listed above.   Long arc quads - 10 reps   Ankle pumps - 10 reps   Hip abduction - 10 reps   Hip adduction squeezes - 10 reps   (Patient requires increased motivation and constant vocal cues in order to complete exercises.)     Patient left up in chair with  OT present..    GOALS:   Multidisciplinary Problems       Physical Therapy Goals          Problem: Physical Therapy    Goal Priority Disciplines Outcome Goal Variances Interventions   Physical Therapy Goal     PT, PT/OT Ongoing, Progressing     Description: Short-term Goals: 1.5 weeks  1. Patient will perform supine to/from sit with standby assist to improve independence and safety with bed mobility.  2. Patient will perform sit to/from stand with a rolling walker with contact guard assist to improve independence and safety with transfers.  3. Patient will ambulate 75 feet with a rolling walker with contact guard assist to improve independence and safety with gait.  4. Patient will tolerate 15 minutes of physical therapy intervention to  improve endurance and activity tolerance.    Long-term goals: 3 weeks  1. Patient will perform supine to/from sit with modified independence to improve independence and safety with bed mobility.  2. Patient will perform sit to/from stand with a rolling walker with standby assist to improve independence and safety with transfers.  3. Patient will ambulate 150 feet with a rolling walker with standby assist to improve independence and safety with gait.  4. Patient will tolerate 30 minutes of physical therapy intervention to improve endurance and activity tolerance.                         Time Tracking:     PT Received On: 01/23/24  PT Start Time: 0926     PT Stop Time: 1006  PT Total Time (min): 40 min     Billable Minutes: Gait Training 15 and Therapeutic Exercise 25    Treatment Type: Treatment  PT/PTA: PTA     Number of PTA visits since last PT visit: 2   GILBERTO Edmond   01/23/2024

## 2024-01-23 NOTE — PROGRESS NOTES
" Around 1747Staff nurse reports that pt is anxious and saying that she wants to die. Upon assessment, daughter is at bedside. Pt lying sitting with HOB elevated, eyes closed, saying "just let me die." She says that she has been having difficulty breathing. Earlier today, she had labs and CXR that showed normal troponin and d-dimer and patchy interstitial densities that may reflect edema. Labs were repeated. She has been tachycardic with O2 sats WNL. Her troponin #2 was 119. CTA was done and showed no PE and noted patchy opacity bilat lower lobes. She was given lasix 10 mg IVP and Ativan 0.5mg po. This helped to rest. Repeat troponin #3 was 131, and this am was #4 was 132. Her condition was discussed with her daughters. Possible transfer was discussed if evidence of distress or AMI was noted. They agreed to keep her here for minimal measures if her condition deteriorated.  "

## 2024-01-23 NOTE — ASSESSMENT & PLAN NOTE
01/22/24 start valacyclovir 1000 mg TID x 7 days  Start gabapentin      01/23/24 continue valacyclovir 1000 mg daily

## 2024-01-23 NOTE — PT/OT/SLP PROGRESS
Occupational Therapy   Treatment    Name: Jackelin Owens  MRN: [id number]  Admitting Diagnosis:  Generalized weakness       Recommendations:     Discharge Recommendations: Low Intensity Therapy  Discharge Equipment Recommendations:  other (see comments)  Barriers to discharge:  None    Assessment:     Jackelin Owens is a 89 y.o. female with a medical diagnosis of Generalized weakness.  She presents with weakness and decline with ADLs. Performance deficits affecting function are weakness, impaired endurance, impaired self care skills, impaired functional mobility, gait instability, impaired balance, decreased coordination, decreased lower extremity function, decreased safety awareness.     Rehab Prognosis:  Good; patient would benefit from acute skilled OT services to address these deficits and reach maximum level of function.       Plan:     Patient to be seen 5 x/week to address the above listed problems via self-care/home management, therapeutic activities, therapeutic exercises  Plan of Care Expires: 02/18/24  Plan of Care Reviewed with: daughter    Subjective     Chief Complaint: Pain and weakness  Patient/Family Comments/goals: to return to prior level of function  Pain/Comfort:       Objective:     Communicated with: Nurse prior to session.  Patient found supine with peripheral IV upon OT entry to room.    General Precautions: Standard, fall    Orthopedic Precautions:N/A  Braces: N/A  Respiratory Status: Room air     Occupational Performance:     Bed Mobility:    Patient completed Rolling/Turning to Left with  minimum assistance  Patient completed Rolling/Turning to Right with minimum assistance  Patient completed Scooting/Bridging with minimum assistance  Patient completed Supine to Sit with minimum assistance     Functional Mobility/Transfers:  Patient completed Sit <> Stand Transfer with minimum assistance  with  rolling walker   Patient completed Bed <> Chair Transfer using Step Transfer technique with  minimum assistance with rolling walker  Functional Mobility: Patient transferred to [facility] with Min A x 2 for OT/PTA Co-treatment to assist with mobility and ambulated with moderate encouragement and assistance to maneuver walker.     Activities of Daily Living:  Upper Body Dressing: minimum assistance to pennie hospital gown  Lower Body Dressing: moderate assistance to pennie socks      AMPAC 6 Click ADL:      Treatment & Education:  Pt performed B UE strengthening exercises to include:   UE bike x 5 min  Shoulder flexion   Chest press    Elbow flexion   Elbow extension   Pectoral stretches   Bilateral rowing  All exercises performed 2x10 reps. Patient performed standing activity with clothes pin tree. Patient was lolita to pennie 10 1#/2# resistive pinch pins on pole with Min A with moderate fatigue and rest breaks. Patient also performed bilateral hand strengthening using gripper x 20 reps and yellow theraputty.     Patient left up in chair with all lines intact, call button in reach, and nursing notified.    GOALS:   Multidisciplinary Problems       Occupational Therapy Goals          Problem: Occupational Therapy    Goal Priority Disciplines Outcome Interventions   Occupational Therapy Goal     OT, PT/OT Ongoing, Progressing    Description: Grooming Status:   Short Term Goal: Pt will perform grooming with Min A sitting EOB.   Long Term Goal: Pt will perform grooming/oral hygiene standing at sink with SBA.      LE dressing Status:   Short Term Goal: Pt will perform LE dressing with Min A.   Long Term Goal: Pt will perform LE dressing with SBA.    Toileting Status:   Short Term Goal: Pt will perform toilet hygiene on BSC with Min A.  Long Term Goal: Pt will perform toilet hygiene on toilet with no AE with SBA.    Commode Transfer:   Short Term Goal: Pt will perform BSC t/f with Min A.  Long Term Goal:  Pt will perform toilet t/f in bathroom with SBA.     Bathing Status:   Long Term Goal: Pt will perform sponge bath with  SBA with no unsafe fatigue.     Strength Status: 5/5 BUEs  Long Term Goal: Pt to perform BUE strengthening with weights and/or body weight to increase ADL independence and safety    Endurance Status:   Short Term Goal:pt to perform 15 min OT treatment with 5 or greater rest breaks  Long Term Goal: pt to perform 30 min OT treat with 3 or less rest breaks                          Time Tracking:     OT Date of Treatment: 01/23/24  OT Start Time: 0957  OT Stop Time: 1043  OT Total Time (min): 46 min    Billable Minutes:Self Care/Home Management 10 min  Therapeutic Activity 16 min  Therapeutic Exercise 20 min    PARISA Thorpe/L, CSRS  OT/CUCA: OT          1/23/2024

## 2024-01-23 NOTE — PLAN OF CARE
Problem: Adult Inpatient Plan of Care  Goal: Plan of Care Review  Outcome: Ongoing, Progressing  Goal: Patient-Specific Goal (Individualized)  Outcome: Ongoing, Progressing  Goal: Absence of Hospital-Acquired Illness or Injury  Outcome: Ongoing, Progressing  Goal: Optimal Comfort and Wellbeing  Outcome: Ongoing, Progressing  Goal: Readiness for Transition of Care  Outcome: Ongoing, Progressing     Problem: Diabetes Comorbidity  Goal: Blood Glucose Level Within Targeted Range  Outcome: Ongoing, Progressing     Problem: Skin Injury Risk Increased  Goal: Skin Health and Integrity  Outcome: Ongoing, Progressing     Problem: Fall Injury Risk  Goal: Absence of Fall and Fall-Related Injury  Outcome: Ongoing, Progressing     Problem: Oral Intake Inadequate  Goal: Improved Oral Intake  Outcome: Ongoing, Progressing     Problem: Malnutrition  Goal: Improved Nutritional Intake  Outcome: Ongoing, Progressing     Problem: Chest Pain  Goal: Resolution of Chest Pain Symptoms  Outcome: Ongoing, Progressing

## 2024-01-23 NOTE — PROGRESS NOTES
[facility] - Medical Surgical [facility]  Hospital Medicine  Progress Note    Patient Name: Jackelin Owens  MRN: [id number]  Patient Class: IP- Swing   Admission Date: 1/18/2024  Length of Stay: 5 days  Attending Physician: Home Alexis IV, DO  Primary Care Provider: Maria A, Primary Doctor        Subjective:     Principal Problem:Generalized weakness    [facility] - Medical Surgical [facility]            HPI:  Jackelin Owens is an 89 year old female with pmh of hypertension, type 2 diabetes mellitus, hypothyroidism and interstitial lung disease.Records show that she was diagnosed with covid and flu approx 2 1/2 weeks ago. She was not prescribed tamiflu or paxlovid but was given an antibiotic. She presented to ER at [facility] with increasing SOB on 01/14/2024. Admitted and treated for covid 19 pneumonia. She tested positive for covid while in [facility] ER. She was treated with Levaquin and remdesevir, supplemental o2 . She has been working with therapy on strengthening but continues to have weakness. She has been accepted to [facility] for swing bed placement       01/18/24 VS on arrival are T98.1-87-/80 sat 95% room air. She is awake, alert, oriented. Accompanied by family members. Complains of occasional productive cough, weakness and poor appetite. Discussed code status and she chose DNI.    Overview/Hospital Course:  01/22/24 Awake and resting in bed. States she does not want to have anymore labs drawn, states she was stuck multiple times this morning and she cannot handle it. Complains of pain left flank radiating to back. States this started yesterday. States pain is at level 10. States area is itchy and painful. Noted small cluster rash left mammary fold- no blisters yet. Will start gabapentin and valcyclovir. Chest is clear. Sat on room air is 95%.   1200 reported to me that Mrs. Owens was complaining of chest pain and that she was tachycardic at rate of 127. Went to room to see  her. She complained of having chest pain. States chest feels heavy. Asked her when this pain began and she states it has been ongoing. Asked her if she had this pain yesterday, she replied she did. Asked her if she had it last week, she again replied that she did- that it has been ongoing. She also complains of having back pain as well -left upper back. She complains of needing to have BM but states she cannot. Complains that she has to urinate. Took her to bathroom , she urinated and had small BM. EKG and troponin ordered. EKG shows sinus tachycardia. Troponin normal at 9.6.   1235 Dr. Alexis here to assess pt and heard expiratory wheezing. Chest xray pending. Duonebs ordered prn. Solumedrol 60 mg IV every 8 hours ordered. Sat on room air is 97%.    1250 Mrs. Owens states she is feeling better. Sat is 97%.    1300 Went back to check on Mrs. Owens and she states she cannot breath. Sat is 97%. Chest xray pending. BNP pending .Telemetry shows sinus tachycardia with rate from 120s to 130s.  States she just wants to die. To just let her die.     1335 Sat continues to be 97% on room air. Heart rate 130s- will give metoprolol po and monitor.     1345 Daughter Clau Nair is in room and Mrs. Campos is much more calm. Heart rate down to 114. Continue to monitor on telemetry.      01/23/24 Awake and resting in bed. Complains of being hungry this morning. Blood glucose increased to over 300- increased levemir to 10 units nightly. Will decrease steroids. No complaints of SOB or chest pain this morning. Does report pain to left flank radiating to back. Continue valcyclovir.    Interval History: weakness, s/p covid pneumonia, shingles    Review of Systems   Constitutional:  Positive for fatigue. Negative for appetite change and fever.        Reports poor appetite for past week   HENT:  Negative for sore throat and trouble swallowing.    Eyes:  Negative for redness.   Respiratory:  Negative for cough, choking, chest tightness  and shortness of breath.    Gastrointestinal:  Negative for abdominal distention, abdominal pain, constipation, diarrhea, nausea and vomiting.   Genitourinary:  Negative for dysuria.   Musculoskeletal:  Positive for back pain. Negative for arthralgias, neck pain and neck stiffness.   Skin:  Negative for pallor and wound.        Complains of pain and itching to left flank-states pain is level 10   Neurological:  Positive for weakness. Negative for light-headedness and headaches.        Pain to left flank radiating to back- complains of itching and pain level of 10     Objective:     Vital Signs (Most Recent):  Temp: 97.6 °F (36.4 °C) (01/23/24 0722)  Pulse: (!) 111 (01/23/24 0722)  Resp: 18 (01/23/24 0722)  BP: (!) 162/84 (01/23/24 0722)  SpO2: 97 % (01/23/24 0722) Vital Signs (24h Range):  Temp:  [97.6 °F (36.4 °C)-98.3 °F (36.8 °C)] 97.6 °F (36.4 °C)  Pulse:  [110-127] 111  Resp:  [18-24] 18  SpO2:  [94 %-97 %] 97 %  BP: (114-162)/(60-85) 162/84     Weight: 50.3 kg (111 lb)  Body mass index is 20.3 kg/m².    Intake/Output Summary (Last 24 hours) at 1/23/2024 0803  Last data filed at 1/23/2024 0445  Gross per 24 hour   Intake 900 ml   Output --   Net 900 ml         Physical Exam  HENT:      Head: Normocephalic.      Mouth/Throat:      Mouth: Mucous membranes are moist.   Cardiovascular:      Rate and Rhythm: Normal rate and regular rhythm.      Pulses: Normal pulses.      Heart sounds: No murmur heard.     No friction rub. No gallop.   Pulmonary:      Effort: Pulmonary effort is normal. No respiratory distress.      Breath sounds: Normal breath sounds. No wheezing or rhonchi.      Comments: Sat on room air is 95%  Abdominal:      General: Bowel sounds are normal. There is no distension.      Palpations: Abdomen is soft. There is no mass.      Tenderness: There is no abdominal tenderness. There is no guarding or rebound.      Hernia: No hernia is present.   Musculoskeletal:         General: Normal range of motion.       Cervical back: Normal range of motion.   Skin:     General: Skin is warm and dry.      Coloration: Skin is not pale.      Findings: No erythema, lesion or rash.      Comments: Small area shingles to left mammary fold- not yet blister stage   Neurological:      Mental Status: She is alert and oriented to person, place, and time.      Motor: Weakness present.      Comments: Jittery this morning- will decrease steroids             Significant Labs: All pertinent labs within the past 24 hours have been reviewed.  Recent Lab Results  (Last 5 results in the past 24 hours)        01/23/24  0603   01/23/24  0557   01/22/24  2055   01/22/24  1957 01/22/24  1805        Anion Gap 11               BUN 22               BUN/CREAT RATIO 17               Calcium 10.4               Chloride 100               CO2 27               Creatinine 1.26               eGFR 41               Glucose 334               POC Glucose   311     355         Potassium 4.2               Sodium 134               Troponin I High Sensitivity 132.2     131.6     119.4                              Significant Imaging: I have reviewed all pertinent imaging results/findings within the past 24 hours.    Assessment/Plan:      * Generalized weakness  01/18/24 PT and OT to evaluate and treat   Reocrds show that she lives alone. She is household ambulator with rollator.      01/22/24 walked 16 feet with therapist during last session, continue therapy for strengthening    Pneumonia due to COVID-19 virus  01/18/24 has recd levaquin and remdesevir for left lower lobe pneumonia       Initiate standard COVID protocols; COVID-19 testing ,Infection Control notification  and isolation- respiratory, contact and droplet per protocol        Management: Inhaled bronchodilators as needed for shortness of breath.  Supplemental o2 as needed     Advance Care Planning Current advance care plan has been discussed with patient/family/POA and patient currently wishes DNR (Do Not  Resuscitate).      Daughter states pt first tested positive for covid on 01/04  She is out of isolation window  O2 sat on room air is 95%    01/22/24 sat on room air is 95%- chest is clear     Type 2 diabetes mellitus  Home med is glimepiride 4 mg po bid   Last A1c reviewed- ordered  Current correctional scale  Low  Maintain anti-hyperglycemic dose as follows-   Antihyperglycemics (From admission, onward)      Start     Stop Route Frequency Ordered    01/23/24 2100  insulin detemir U-100 injection 10 Units         -- SubQ Nightly 01/23/24 0727    01/22/24 2243  insulin aspart U-100 injection 0-5 Units         -- SubQ Before meals & nightly PRN 01/22/24 2144          Hold Oral hypoglycemics while patient is in the hospital.    Accuchecks ac and hs with low dose SSI coverage       01/22/24 add 5 units levemir at hs     01/23/24 increase levemir to 10 units     Hypertension  01/18/24 continue lopressor 50 mg po daily     And apresoline 25 mg po BID      While in the hospital, will manage blood pressure as follows; Continue home antihypertensive regimen    Will utilize p.r.n. blood pressure medication only if patient's blood pressure greater than 180/110 and she develops symptoms such as worsening chest pain or shortness of breath.      01/23/24 stable     Interstitial lung disease  01/18/24 continue theophylline 200 mg po BID       01/24/23 decrease steroids, duonebs prn wheezing    Hypothyroidism  01/18/24 continue levothyroxine 50 mcg daily      01/24/23 continue levothyroxine    Poor appetite    01/18/24 diabetic diet   Ensure supplement TID and prn     GERD (gastroesophageal reflux disease)  01/18/24 continue famotidine        Shingles    01/22/24 start valacyclovir 1000 mg TID x 7 days  Start gabapentin      01/23/24 continue valacyclovir 1000 mg daily      VTE Risk Mitigation (From admission, onward)           Ordered     enoxaparin injection 30 mg  Every 24 hours         01/19/24 0706     IP VTE LOW RISK PATIENT   Once         01/18/24 1434     Place CARLOS hose  Until discontinued         01/18/24 1434                    Discharge Planning   CHELSEA: 2/8/2024     Code Status: Partial Code   Is the patient medically ready for discharge?:     Reason for patient still in hospital (select all that apply): Treatment  Discharge Plan A: Home with family                  Home Alexis IV, DO  Department of Hospital Medicine   [facility] - Medical Surgical Unit

## 2024-01-23 NOTE — ASSESSMENT & PLAN NOTE
01/18/24 continue theophylline 200 mg po BID       01/24/23 decrease steroids, duonebs prn wheezing

## 2024-01-23 NOTE — PLAN OF CARE
Problem: Adult Inpatient Plan of Care  Goal: Optimal Comfort and Wellbeing  Outcome: Ongoing, Progressing     Problem: Skin Injury Risk Increased  Goal: Skin Health and Integrity  Outcome: Ongoing, Progressing     Problem: Fall Injury Risk  Goal: Absence of Fall and Fall-Related Injury  Outcome: Ongoing, Progressing     Problem: Chest Pain  Goal: Resolution of Chest Pain Symptoms  Outcome: Ongoing, Progressing

## 2024-01-23 NOTE — SUBJECTIVE & OBJECTIVE
Interval History: weakness, s/p covid pneumonia, shingles    Review of Systems   Constitutional:  Positive for fatigue. Negative for appetite change and fever.        Reports poor appetite for past week   HENT:  Negative for sore throat and trouble swallowing.    Eyes:  Negative for redness.   Respiratory:  Negative for cough, choking, chest tightness and shortness of breath.    Gastrointestinal:  Negative for abdominal distention, abdominal pain, constipation, diarrhea, nausea and vomiting.   Genitourinary:  Negative for dysuria.   Musculoskeletal:  Positive for back pain. Negative for arthralgias, neck pain and neck stiffness.   Skin:  Negative for pallor and wound.        Complains of pain and itching to left flank-states pain is level 10   Neurological:  Positive for weakness. Negative for light-headedness and headaches.        Pain to left flank radiating to back- complains of itching and pain level of 10     Objective:     Vital Signs (Most Recent):  Temp: 97.6 °F (36.4 °C) (01/23/24 0722)  Pulse: (!) 111 (01/23/24 0722)  Resp: 18 (01/23/24 0722)  BP: (!) 162/84 (01/23/24 0722)  SpO2: 97 % (01/23/24 0722) Vital Signs (24h Range):  Temp:  [97.6 °F (36.4 °C)-98.3 °F (36.8 °C)] 97.6 °F (36.4 °C)  Pulse:  [110-127] 111  Resp:  [18-24] 18  SpO2:  [94 %-97 %] 97 %  BP: (114-162)/(60-85) 162/84     Weight: 50.3 kg (111 lb)  Body mass index is 20.3 kg/m².    Intake/Output Summary (Last 24 hours) at 1/23/2024 0803  Last data filed at 1/23/2024 0445  Gross per 24 hour   Intake 900 ml   Output --   Net 900 ml         Physical Exam  HENT:      Head: Normocephalic.      Mouth/Throat:      Mouth: Mucous membranes are moist.   Cardiovascular:      Rate and Rhythm: Normal rate and regular rhythm.      Pulses: Normal pulses.      Heart sounds: No murmur heard.     No friction rub. No gallop.   Pulmonary:      Effort: Pulmonary effort is normal. No respiratory distress.      Breath sounds: Normal breath sounds. No wheezing or  rhonchi.      Comments: Sat on room air is 95%  Abdominal:      General: Bowel sounds are normal. There is no distension.      Palpations: Abdomen is soft. There is no mass.      Tenderness: There is no abdominal tenderness. There is no guarding or rebound.      Hernia: No hernia is present.   Musculoskeletal:         General: Normal range of motion.      Cervical back: Normal range of motion.   Skin:     General: Skin is warm and dry.      Coloration: Skin is not pale.      Findings: No erythema, lesion or rash.      Comments: Small area shingles to left mammary fold- not yet blister stage   Neurological:      Mental Status: She is alert and oriented to person, place, and time.      Motor: Weakness present.      Comments: Jittery this morning- will decrease steroids             Significant Labs: All pertinent labs within the past 24 hours have been reviewed.  Recent Lab Results  (Last 5 results in the past 24 hours)        01/23/24  0603   01/23/24  0557   01/22/24  2055   01/22/24  1957   01/22/24  1805        Anion Gap 11               BUN 22               BUN/CREAT RATIO 17               Calcium 10.4               Chloride 100               CO2 27               Creatinine 1.26               eGFR 41               Glucose 334               POC Glucose   311     355         Potassium 4.2               Sodium 134               Troponin I High Sensitivity 132.2     131.6     119.4                              Significant Imaging: I have reviewed all pertinent imaging results/findings within the past 24 hours.

## 2024-01-23 NOTE — ASSESSMENT & PLAN NOTE
Home med is glimepiride 4 mg po bid   Last A1c reviewed- ordered  Current correctional scale  Low  Maintain anti-hyperglycemic dose as follows-   Antihyperglycemics (From admission, onward)      Start     Stop Route Frequency Ordered    01/23/24 2100  insulin detemir U-100 injection 10 Units         -- SubQ Nightly 01/23/24 0727    01/22/24 2243  insulin aspart U-100 injection 0-5 Units         -- SubQ Before meals & nightly PRN 01/22/24 2144          Hold Oral hypoglycemics while patient is in the hospital.    Accuchecks ac and hs with low dose SSI coverage       01/22/24 add 5 units levemir at hs     01/23/24 increase levemir to 10 units

## 2024-01-23 NOTE — ASSESSMENT & PLAN NOTE
01/18/24 continue lopressor 50 mg po daily     And apresoline 25 mg po BID      While in the hospital, will manage blood pressure as follows; Continue home antihypertensive regimen    Will utilize p.r.n. blood pressure medication only if patient's blood pressure greater than 180/110 and she develops symptoms such as worsening chest pain or shortness of breath.      01/23/24 stable

## 2024-01-24 PROBLEM — U07.1 PNEUMONIA DUE TO COVID-19 VIRUS: Status: RESOLVED | Noted: 2024-01-18 | Resolved: 2024-01-24

## 2024-01-24 PROBLEM — J12.82 PNEUMONIA DUE TO COVID-19 VIRUS: Status: RESOLVED | Noted: 2024-01-18 | Resolved: 2024-01-24

## 2024-01-24 LAB
GLUCOSE SERPL-MCNC: 115 MG/DL (ref 70–105)
GLUCOSE SERPL-MCNC: 231 MG/DL (ref 70–105)
GLUCOSE SERPL-MCNC: 252 MG/DL (ref 70–105)
GLUCOSE SERPL-MCNC: 272 MG/DL (ref 70–105)

## 2024-01-24 PROCEDURE — 27000982 HC MATTRESS, MATRIX LAL RENTAL

## 2024-01-24 PROCEDURE — 97110 THERAPEUTIC EXERCISES: CPT

## 2024-01-24 PROCEDURE — 25000003 PHARM REV CODE 250: Performed by: NURSE PRACTITIONER

## 2024-01-24 PROCEDURE — 63600175 PHARM REV CODE 636 W HCPCS: Performed by: NURSE PRACTITIONER

## 2024-01-24 PROCEDURE — 99308 SBSQ NF CARE LOW MDM 20: CPT | Mod: ,,, | Performed by: NURSE PRACTITIONER

## 2024-01-24 PROCEDURE — 82962 GLUCOSE BLOOD TEST: CPT

## 2024-01-24 PROCEDURE — 27000944

## 2024-01-24 PROCEDURE — 97116 GAIT TRAINING THERAPY: CPT | Mod: CQ

## 2024-01-24 PROCEDURE — 94761 N-INVAS EAR/PLS OXIMETRY MLT: CPT

## 2024-01-24 PROCEDURE — 63600175 PHARM REV CODE 636 W HCPCS: Performed by: FAMILY MEDICINE

## 2024-01-24 PROCEDURE — 97530 THERAPEUTIC ACTIVITIES: CPT

## 2024-01-24 PROCEDURE — 11000004 HC SNF PRIVATE

## 2024-01-24 RX ADMIN — ENOXAPARIN SODIUM 30 MG: 30 INJECTION SUBCUTANEOUS at 05:01

## 2024-01-24 RX ADMIN — FLUTICASONE PROPIONATE 100 MCG: 50 SPRAY, METERED NASAL at 08:01

## 2024-01-24 RX ADMIN — ZINC SULFATE 220 MG (50 MG) CAPSULE 220 MG: CAPSULE at 08:01

## 2024-01-24 RX ADMIN — CHOLECALCIFEROL TAB 25 MCG (1000 UNIT) 2000 UNITS: 25 TAB at 08:01

## 2024-01-24 RX ADMIN — OXYCODONE HYDROCHLORIDE AND ACETAMINOPHEN 1000 MG: 500 TABLET ORAL at 08:01

## 2024-01-24 RX ADMIN — SENNOSIDES AND DOCUSATE SODIUM 1 TABLET: 8.6; 5 TABLET ORAL at 08:01

## 2024-01-24 RX ADMIN — THEOPHYLLINE ANHYDROUS 200 MG: 200 CAPSULE, EXTENDED RELEASE ORAL at 08:01

## 2024-01-24 RX ADMIN — ACETAMINOPHEN 650 MG: 325 TABLET ORAL at 08:01

## 2024-01-24 RX ADMIN — HYDRALAZINE HYDROCHLORIDE 25 MG: 25 TABLET ORAL at 08:01

## 2024-01-24 RX ADMIN — Medication 400 MG: at 08:01

## 2024-01-24 RX ADMIN — FAMOTIDINE 20 MG: 20 TABLET ORAL at 08:01

## 2024-01-24 RX ADMIN — METOPROLOL TARTRATE 50 MG: 50 TABLET, FILM COATED ORAL at 08:01

## 2024-01-24 RX ADMIN — INSULIN ASPART 6 UNITS: 100 INJECTION, SOLUTION INTRAVENOUS; SUBCUTANEOUS at 04:01

## 2024-01-24 RX ADMIN — OMEGA-3 FATTY ACIDS CAP 1000 MG 1 CAPSULE: 1000 CAP at 08:01

## 2024-01-24 RX ADMIN — INSULIN ASPART 2 UNITS: 100 INJECTION, SOLUTION INTRAVENOUS; SUBCUTANEOUS at 08:01

## 2024-01-24 RX ADMIN — INSULIN ASPART 6 UNITS: 100 INJECTION, SOLUTION INTRAVENOUS; SUBCUTANEOUS at 11:01

## 2024-01-24 RX ADMIN — LEVOTHYROXINE SODIUM 50 MCG: 50 TABLET ORAL at 06:01

## 2024-01-24 RX ADMIN — INSULIN DETEMIR 10 UNITS: 100 INJECTION, SOLUTION SUBCUTANEOUS at 08:01

## 2024-01-24 RX ADMIN — VALACYCLOVIR HYDROCHLORIDE 1000 MG: 500 TABLET, FILM COATED ORAL at 08:01

## 2024-01-24 NOTE — PROGRESS NOTES
Wt: 111#  RDN visited pt this a.m.  She c/o being tired.  Pt c/o trouble swallowing but nursing stated pt is not choking etc.  Pt has a hx of anxiety.  Meal intake ~70%  She is drinking some Breeze daily.  Noted adjustment in Insulin and new order for Valtrex.  Last BM ,  BUN 22 crt 1.26. No c/o nausea. B-355. Continue POC.

## 2024-01-24 NOTE — PROGRESS NOTES
[facility] - Medical Surgical Unit  Hospital Medicine  Progress Note    Patient Name: Jackelin Owens  MRN: [id number]  Patient Class: IP- Swing   Admission Date: 1/18/2024  Length of Stay: 6 days  Attending Physician: Home Alexis IV, DO  Primary Care Provider: Maria A, Primary Doctor        Subjective:     Principal Problem:Generalized weakness        HPI:  Jackelin Owens is an 89 year old female with pmh of hypertension, type 2 diabetes mellitus, hypothyroidism and interstitial lung disease.Records show that she was diagnosed with covid and flu approx 2 1/2 weeks ago. She was not prescribed tamiflu or paxlovid but was given an antibiotic. She presented to ER at [facility] with increasing SOB on 01/14/2024. Admitted and treated for covid 19 pneumonia. She tested positive for covid while in [facility] ER. She was treated with Levaquin and remdesevir, supplemental o2 . She has been working with therapy on strengthening but continues to have weakness. She has been accepted to [facility] for swing bed placement       01/18/24 VS on arrival are T98.1-87-/80 sat 95% room air. She is awake, alert, oriented. Accompanied by family members. Complains of occasional productive cough, weakness and poor appetite. Discussed code status and she chose DNI.    Overview/Hospital Course:  01/22/24 Awake and resting in bed. States she does not want to have anymore labs drawn, states she was stuck multiple times this morning and she cannot handle it. Complains of pain left flank radiating to back. States this started yesterday. States pain is at level 10. States area is itchy and painful. Noted small cluster rash left mammary fold- no blisters yet. Will start gabapentin and valcyclovir. Chest is clear. Sat on room air is 95%.   1200 reported to me that Mrs. Ownes was complaining of chest pain and that she was tachycardic at rate of 127. Went to room to see her. She complained of having chest pain. States chest  feels heavy. Asked her when this pain began and she states it has been ongoing. Asked her if she had this pain yesterday, she replied she did. Asked her if she had it last week, she again replied that she did- that it has been ongoing. She also complains of having back pain as well -left upper back. She complains of needing to have BM but states she cannot. Complains that she has to urinate. Took her to bathroom , she urinated and had small BM. EKG and troponin ordered. EKG shows sinus tachycardia. Troponin normal at 9.6.   1235 Dr. Alexis here to assess pt and heard expiratory wheezing. Chest xray pending. Duonebs ordered prn. Solumedrol 60 mg IV every 8 hours ordered. Sat on room air is 97%.    1250 Mrs. Owens states she is feeling better. Sat is 97%.    1300 Went back to check on Mrs. Owens and she states she cannot breath. Sat is 97%. Chest xray pending. BNP pending .Telemetry shows sinus tachycardia with rate from 120s to 130s.  States she just wants to die. To just let her die.     1335 Sat continues to be 97% on room air. Heart rate 130s- will give metoprolol po and monitor.     1345 Daughter Clau Nair is in room and Mrs. Campos is much more calm. Heart rate down to 114. Continue to monitor on telemetry.      01/23/24 Awake and resting in bed. Complains of being hungry this morning. Blood glucose increased to over 300- increased levemir to 10 units nightly. Will decrease steroids. No complaints of SOB or chest pain this morning. Does report pain to left flank radiating to back. Continue valcyclovir.      01/23/24 nurse reports poct glucose over 500 - will have lab to verify- increase to moderate SSI. Levemir increased to 10 units nightly. Methylprednisone dcd      01/24/24 Awake and complaining of frontal headache. Tylenol given for relief. Blood glucose this morning is 115.continue levemir and mod SSI.     Interval History: weakness    Review of Systems   Constitutional:  Positive for fatigue. Negative  for appetite change and fever.        Appetite gradually increasing   HENT:  Negative for sore throat and trouble swallowing.    Eyes:  Negative for redness.   Respiratory:  Negative for cough, choking, chest tightness and shortness of breath.    Gastrointestinal:  Negative for abdominal distention, abdominal pain, constipation, diarrhea, nausea and vomiting.   Genitourinary:  Negative for dysuria.   Musculoskeletal:  Positive for back pain. Negative for arthralgias, neck pain and neck stiffness.   Skin:  Negative for pallor and wound.        Complains of pain and itching to left flank-states pain is level 10   Neurological:  Positive for weakness. Negative for light-headedness and headaches.     Objective:     Vital Signs (Most Recent):  Temp: 97.5 °F (36.4 °C) (01/24/24 0715)  Pulse: 99 (01/24/24 0715)  Resp: 20 (01/24/24 0715)  BP: 135/65 (01/24/24 0715)  SpO2: 96 % (01/24/24 0715) Vital Signs (24h Range):  Temp:  [97.1 °F (36.2 °C)-98.3 °F (36.8 °C)] 97.5 °F (36.4 °C)  Pulse:  [] 99  Resp:  [18-20] 20  SpO2:  [94 %-97 %] 96 %  BP: (109-173)/(50-80) 135/65     Weight: 50.5 kg (111 lb 6.4 oz)  Body mass index is 20.38 kg/m².    Intake/Output Summary (Last 24 hours) at 1/24/2024 0914  Last data filed at 1/24/2024 0311  Gross per 24 hour   Intake 800 ml   Output --   Net 800 ml         Physical Exam  HENT:      Head: Normocephalic.      Mouth/Throat:      Mouth: Mucous membranes are moist.   Cardiovascular:      Rate and Rhythm: Normal rate and regular rhythm.      Pulses: Normal pulses.      Heart sounds: No murmur heard.     No friction rub. No gallop.   Pulmonary:      Effort: Pulmonary effort is normal. No respiratory distress.      Breath sounds: Normal breath sounds. No wheezing or rhonchi.      Comments: Sat on room air is 95%  Abdominal:      General: Bowel sounds are normal. There is no distension.      Palpations: Abdomen is soft. There is no mass.      Tenderness: There is no abdominal tenderness.  There is no guarding or rebound.      Hernia: No hernia is present.   Musculoskeletal:         General: Normal range of motion.      Cervical back: Normal range of motion.   Skin:     General: Skin is warm and dry.      Coloration: Skin is not pale.      Findings: No erythema, lesion or rash.   Neurological:      Mental Status: She is alert and oriented to person, place, and time.      Motor: Weakness present.             Significant Labs: All pertinent labs within the past 24 hours have been reviewed.  Recent Lab Results  (Last 5 results in the past 24 hours)        01/24/24  0559   01/23/24  2009   01/23/24  1634   01/23/24  1537   01/23/24  1218        Glucose         552       POC Glucose 115   283   283   346                                Significant Imaging: I have reviewed all pertinent imaging results/findings within the past 24 hours.    Assessment/Plan:      * Generalized weakness  01/18/24 PT and OT to evaluate and treat   Reocrds show that she lives alone. She is household ambulator with rollator.      01/22/24 walked 16 feet with therapist during last session, continue therapy for strengthening    01/24/24 walked 30 feet with therapist yesterday    Type 2 diabetes mellitus  Home med is glimepiride 4 mg po bid   Last A1c reviewed- ordered  Current correctional scale  Low  Maintain anti-hyperglycemic dose as follows-   Antihyperglycemics (From admission, onward)      Start     Stop Route Frequency Ordered    01/23/24 2100  insulin detemir U-100 injection 10 Units         -- SubQ Nightly 01/23/24 0727    01/23/24 1252  insulin aspart U-100 injection 0-10 Units         -- SubQ Before meals & nightly PRN 01/23/24 1153          Hold Oral hypoglycemics while patient is in the hospital.    Accuchecks ac and hs with low dose SSI coverage       01/22/24 add 5 units levemir at hs     01/23/24 increase levemir to 10 units   Increased SSI to moderate  Dcd steroids    01/24/24 Blood glucose 115 this  morning        Hypertension  01/18/24 continue lopressor 50 mg po daily     And apresoline 25 mg po BID      While in the hospital, will manage blood pressure as follows; Continue home antihypertensive regimen    Will utilize p.r.n. blood pressure medication only if patient's blood pressure greater than 180/110 and she develops symptoms such as worsening chest pain or shortness of breath.      01/23/24 stable     Interstitial lung disease  01/18/24 continue theophylline 200 mg po BID       01/24/23 decrease steroids, duonebs prn wheezing    Hypothyroidism  01/18/24 continue levothyroxine 50 mcg daily      01/24/23 continue levothyroxine    Poor appetite    01/18/24 diabetic diet   Ensure supplement TID and prn     GERD (gastroesophageal reflux disease)  01/18/24 continue famotidine        Shingles    01/22/24 start valacyclovir 1000 mg TID x 7 days  Start gabapentin      01/23/24 continue valacyclovir 1000 mg daily      VTE Risk Mitigation (From admission, onward)           Ordered     enoxaparin injection 30 mg  Every 24 hours         01/19/24 0706     IP VTE LOW RISK PATIENT  Once         01/18/24 1434     Place CARLOS hose  Until discontinued         01/18/24 1434                    Discharge Planning   CHELSEA: 2/8/2024     Code Status: Partial Code   Is the patient medically ready for discharge?:     Reason for patient still in hospital (select all that apply): Treatment  Discharge Plan A: Home with family                  MARCELINA Mir  Department of Hospital Medicine   [facility] - [facility] Surgical Unit

## 2024-01-24 NOTE — PROGRESS NOTES
[facility] - [facility] Surgical Unit  Hospital Medicine  Progress Note    Patient Name: Jackelin Owens  MRN: [id number]  Patient Class: IP- Swing   Admission Date: 1/18/2024  Length of Stay: 6 days  Attending Physician: Home Alexis IV, DO  Primary Care Provider: Maria A, Primary Doctor        Subjective:     Principal Problem:Generalized weakness        HPI:  Jackelin Owens is an 89 year old female with pmh of hypertension, type 2 diabetes mellitus, hypothyroidism and interstitial lung disease.Records show that she was diagnosed with covid and flu approx 2 1/2 weeks ago. She was not prescribed tamiflu or paxlovid but was given an antibiotic. She presented to ER at [facility] with increasing SOB on 01/14/2024. Admitted and treated for covid 19 pneumonia. She tested positive for covid while in [facility] ER. She was treated with Levaquin and remdesevir, supplemental o2 . She has been working with therapy on strengthening but continues to have weakness. She has been accepted to [facility] for swing bed placement       01/18/24 VS on arrival are T98.1-87-/80 sat 95% room air. She is awake, alert, oriented. Accompanied by family members. Complains of occasional productive cough, weakness and poor appetite. Discussed code status and she chose DNI.    Overview/Hospital Course:  01/22/24 Awake and resting in bed. States she does not want to have anymore labs drawn, states she was stuck multiple times this morning and she cannot handle it. Complains of pain left flank radiating to back. States this started yesterday. States pain is at level 10. States area is itchy and painful. Noted small cluster rash left mammary fold- no blisters yet. Will start gabapentin and valcyclovir. Chest is clear. Sat on room air is 95%.   1200 reported to me that Mrs. Owens was complaining of chest pain and that she was tachycardic at rate of 127. Went to room to see her. She complained of having chest pain. States chest  feels heavy. Asked her when this pain began and she states it has been ongoing. Asked her if she had this pain yesterday, she replied she did. Asked her if she had it last week, she again replied that she did- that it has been ongoing. She also complains of having back pain as well -left upper back. She complains of needing to have BM but states she cannot. Complains that she has to urinate. Took her to bathroom , she urinated and had small BM. EKG and troponin ordered. EKG shows sinus tachycardia. Troponin normal at 9.6.   1235 Dr. Alexis here to assess pt and heard expiratory wheezing. Chest xray pending. Duonebs ordered prn. Solumedrol 60 mg IV every 8 hours ordered. Sat on room air is 97%.    1250 Mrs. Owens states she is feeling better. Sat is 97%.    1300 Went back to check on Mrs. Owens and she states she cannot breath. Sat is 97%. Chest xray pending. BNP pending .Telemetry shows sinus tachycardia with rate from 120s to 130s.  States she just wants to die. To just let her die.     1335 Sat continues to be 97% on room air. Heart rate 130s- will give metoprolol po and monitor.     1345 Daughter Clau Nair is in room and Mrs. Campos is much more calm. Heart rate down to 114. Continue to monitor on telemetry.      01/23/24 Awake and resting in bed. Complains of being hungry this morning. Blood glucose increased to over 300- increased levemir to 10 units nightly. Will decrease steroids. No complaints of SOB or chest pain this morning. Does report pain to left flank radiating to back. Continue valcyclovir.      01/23/24 nurse reports poct glucose over 500 - will have lab to verify- increase to moderate SSI. Levemir increased to 10 units nightly. Methylprednisone dcd      01/24/24 Awake and complaining of frontal headache. Tylenol given for relief. Blood glucose this morning is 115.continue levemir and mod SSI.     No new subjective & objective note has been filed under this hospital service since the last note  was generated.      Assessment/Plan:      * Generalized weakness  01/18/24 PT and OT to evaluate and treat   Reocrds show that she lives alone. She is household ambulator with rollator.      01/22/24 walked 16 feet with therapist during last session, continue therapy for strengthening    01/24/24 walked 30 feet with therapist yesterday    Type 2 diabetes mellitus  Home med is glimepiride 4 mg po bid   Last A1c reviewed- ordered  Current correctional scale  Low  Maintain anti-hyperglycemic dose as follows-   Antihyperglycemics (From admission, onward)      Start     Stop Route Frequency Ordered    01/23/24 2100  insulin detemir U-100 injection 10 Units         -- SubQ Nightly 01/23/24 0727    01/23/24 1252  insulin aspart U-100 injection 0-10 Units         -- SubQ Before meals & nightly PRN 01/23/24 1153          Hold Oral hypoglycemics while patient is in the hospital.    Accuchecks ac and hs with low dose SSI coverage       01/22/24 add 5 units levemir at hs     01/23/24 increase levemir to 10 units   Increased SSI to moderate  Dcd steroids    01/24/24 Blood glucose 115 this morning        Hypertension  01/18/24 continue lopressor 50 mg po daily     And apresoline 25 mg po BID      While in the hospital, will manage blood pressure as follows; Continue home antihypertensive regimen    Will utilize p.r.n. blood pressure medication only if patient's blood pressure greater than 180/110 and she develops symptoms such as worsening chest pain or shortness of breath.      01/23/24 stable     Interstitial lung disease  01/18/24 continue theophylline 200 mg po BID       01/24/23 decrease steroids, duonebs prn wheezing    Hypothyroidism  01/18/24 continue levothyroxine 50 mcg daily      01/24/23 continue levothyroxine    Poor appetite    01/18/24 diabetic diet   Ensure supplement TID and prn     GERD (gastroesophageal reflux disease)  01/18/24 continue famotidine        Shingles    01/22/24 start valacyclovir 1000 mg TID x 7  days  Start gabapentin      01/23/24 continue valacyclovir 1000 mg daily      VTE Risk Mitigation (From admission, onward)           Ordered     enoxaparin injection 30 mg  Every 24 hours         01/19/24 0706     IP VTE LOW RISK PATIENT  Once         01/18/24 1434     Place CARLOS hose  Until discontinued         01/18/24 1434                    Discharge Planning   CHELSEA: 2/8/2024     Code Status: Partial Code   Is the patient medically ready for discharge?:     Reason for patient still in hospital (select all that apply): Treatment  Discharge Plan A: Home with family                  MARCELINA Mir  Department of Hospital Medicine   [facility] - [facility]

## 2024-01-24 NOTE — ASSESSMENT & PLAN NOTE
Home med is glimepiride 4 mg po bid   Last A1c reviewed- ordered  Current correctional scale  Low  Maintain anti-hyperglycemic dose as follows-   Antihyperglycemics (From admission, onward)      Start     Stop Route Frequency Ordered    01/23/24 2100  insulin detemir U-100 injection 10 Units         -- SubQ Nightly 01/23/24 0727    01/23/24 1252  insulin aspart U-100 injection 0-10 Units         -- SubQ Before meals & nightly PRN 01/23/24 1153          Hold Oral hypoglycemics while patient is in the hospital.    Accuchecks ac and hs with low dose SSI coverage       01/22/24 add 5 units levemir at hs     01/23/24 increase levemir to 10 units   Increased SSI to moderate  Dcd steroids    01/24/24 Blood glucose 115 this morning

## 2024-01-24 NOTE — PT/OT/SLP PROGRESS
"Physical Therapy Treatment    Patient Name:  Jackelin Owens   MRN:  [id number]    Recommendations:     Discharge Recommendations: Low Intensity Therapy  Discharge Equipment Recommendations: other (see comments) (May need a rolling walker on discharge; PT will continue to assess.)  Barriers to discharge: Decreased caregiver support    Assessment:     Jackelin Owens is a 89 y.o. female admitted with a medical diagnosis of Generalized weakness.  She presents with the following impairments/functional limitations: weakness, impaired endurance, impaired self care skills, impaired functional mobility, gait instability, impaired balance, decreased coordination, decreased lower extremity function, decreased safety awareness Patient was willing to participate, but had complaint of fatigue due to "not being able to sleep". Patient ambulated 30' to chair, but then refused further treatment due to being fatigued and not being able to breath. Therapist attempted multiple times to get patient to participate in further exercises, but patient refused each time. Patient manly had complaints of fatigue, not being able to breath, and being unable to complete further exercises.     Rehab Prognosis: Fair; patient would benefit from acute skilled PT services to address these deficits and reach maximum level of function.    Recent Surgery: * No surgery found *      Plan:     During this hospitalization, patient to be seen 5 x/week to address the identified rehab impairments via gait training, therapeutic activities, therapeutic exercises, neuromuscular re-education and progress toward the following goals:    Plan of Care Expires:  02/08/24    Subjective     Chief Complaint: feeling unwell   Patient/Family Comments/goals: return back home   Pain/Comfort:  Pain Rating 1: 0/10  Pain Rating Post-Intervention 1: 0/10      Objective:     Communicated with OT prior to session.  Patient found HOB elevated with peripheral IV upon PT entry to room. " "    General Precautions: Standard, fall  Orthopedic Precautions: N/A  Braces: N/A  Respiratory Status: Room air     Functional Mobility:  Bed Mobility:     Supine to Sit: minimum assistance  Transfers:     Sit to Stand:  minimum assistance with rolling walker  Bed to Chair: minimum assistance with  rolling walker  using  Step Transfer  Gait: Patient ambulated 30' with min a and RW. Patient ambulated with decreased step lengths and no LOB.      AM-PAC 6 CLICK MOBILITY  Turning over in bed (including adjusting bedclothes, sheets and blankets)?: 3  Sitting down on and standing up from a chair with arms (e.g., wheelchair, bedside commode, etc.): 3  Moving from lying on back to sitting on the side of the bed?: 3  Moving to and from a bed to a chair (including a wheelchair)?: 3  Need to walk in hospital room?: 3  Climbing 3-5 steps with a railing?: 2  Basic Mobility Total Score: 17       Treatment & Education:  Transfers and ambulation as listed above.   Sit to stands - 5 reps  Patient refused further exercises - "I can not do anymore", "I can not breath", "I can not sleep and tired"    Patient left up in chair with call button in reach and chair alarm on..    GOALS:   Multidisciplinary Problems       Physical Therapy Goals          Problem: Physical Therapy    Goal Priority Disciplines Outcome Goal Variances Interventions   Physical Therapy Goal     PT, PT/OT Ongoing, Progressing     Description: Short-term Goals: 1.5 weeks  1. Patient will perform supine to/from sit with standby assist to improve independence and safety with bed mobility.  2. Patient will perform sit to/from stand with a rolling walker with contact guard assist to improve independence and safety with transfers.  3. Patient will ambulate 75 feet with a rolling walker with contact guard assist to improve independence and safety with gait.  4. Patient will tolerate 15 minutes of physical therapy intervention to improve endurance and activity " tolerance.    Long-term goals: 3 weeks  1. Patient will perform supine to/from sit with modified independence to improve independence and safety with bed mobility.  2. Patient will perform sit to/from stand with a rolling walker with standby assist to improve independence and safety with transfers.  3. Patient will ambulate 150 feet with a rolling walker with standby assist to improve independence and safety with gait.  4. Patient will tolerate 30 minutes of physical therapy intervention to improve endurance and activity tolerance.                         Time Tracking:     PT Received On: 01/24/24  PT Start Time: 0905     PT Stop Time: 0924  PT Total Time (min): 19 min     Billable Minutes: Gait Training 15    Treatment Type: Treatment  PT/PTA: PTA     Number of PTA visits since last PT visit: 3   GILBERTO Edmond   01/24/2024

## 2024-01-24 NOTE — ASSESSMENT & PLAN NOTE
01/18/24 PT and OT to evaluate and treat   Brown show that she lives alone. She is household ambulator with rollator.      01/22/24 walked 16 feet with therapist during last session, continue therapy for strengthening    01/24/24 walked 30 feet with therapist yesterday

## 2024-01-24 NOTE — SUBJECTIVE & OBJECTIVE
Interval History: weakness    Review of Systems   Constitutional:  Positive for fatigue. Negative for appetite change and fever.        Appetite gradually increasing   HENT:  Negative for sore throat and trouble swallowing.    Eyes:  Negative for redness.   Respiratory:  Negative for cough, choking, chest tightness and shortness of breath.    Gastrointestinal:  Negative for abdominal distention, abdominal pain, constipation, diarrhea, nausea and vomiting.   Genitourinary:  Negative for dysuria.   Musculoskeletal:  Positive for back pain. Negative for arthralgias, neck pain and neck stiffness.   Skin:  Negative for pallor and wound.        Complains of pain and itching to left flank-states pain is level 10   Neurological:  Positive for weakness. Negative for light-headedness and headaches.     Objective:     Vital Signs (Most Recent):  Temp: 97.5 °F (36.4 °C) (01/24/24 0715)  Pulse: 99 (01/24/24 0715)  Resp: 20 (01/24/24 0715)  BP: 135/65 (01/24/24 0715)  SpO2: 96 % (01/24/24 0715) Vital Signs (24h Range):  Temp:  [97.1 °F (36.2 °C)-98.3 °F (36.8 °C)] 97.5 °F (36.4 °C)  Pulse:  [] 99  Resp:  [18-20] 20  SpO2:  [94 %-97 %] 96 %  BP: (109-173)/(50-80) 135/65     Weight: 50.5 kg (111 lb 6.4 oz)  Body mass index is 20.38 kg/m².    Intake/Output Summary (Last 24 hours) at 1/24/2024 0914  Last data filed at 1/24/2024 0311  Gross per 24 hour   Intake 800 ml   Output --   Net 800 ml         Physical Exam  HENT:      Head: Normocephalic.      Mouth/Throat:      Mouth: Mucous membranes are moist.   Cardiovascular:      Rate and Rhythm: Normal rate and regular rhythm.      Pulses: Normal pulses.      Heart sounds: No murmur heard.     No friction rub. No gallop.   Pulmonary:      Effort: Pulmonary effort is normal. No respiratory distress.      Breath sounds: Normal breath sounds. No wheezing or rhonchi.      Comments: Sat on room air is 95%  Abdominal:      General: Bowel sounds are normal. There is no distension.       Palpations: Abdomen is soft. There is no mass.      Tenderness: There is no abdominal tenderness. There is no guarding or rebound.      Hernia: No hernia is present.   Musculoskeletal:         General: Normal range of motion.      Cervical back: Normal range of motion.   Skin:     General: Skin is warm and dry.      Coloration: Skin is not pale.      Findings: No erythema, lesion or rash.   Neurological:      Mental Status: She is alert and oriented to person, place, and time.      Motor: Weakness present.             Significant Labs: All pertinent labs within the past 24 hours have been reviewed.  Recent Lab Results  (Last 5 results in the past 24 hours)        01/24/24  0559   01/23/24  2009   01/23/24  1634   01/23/24  1537   01/23/24  1218        Glucose         552       POC Glucose 115   283   283   346                                Significant Imaging: I have reviewed all pertinent imaging results/findings within the past 24 hours.

## 2024-01-24 NOTE — PT/OT/SLP PROGRESS
Occupational Therapy   Treatment    Name: Jackelin Owens  MRN: [id number]  Admitting Diagnosis:  Generalized weakness       Recommendations:     Discharge Recommendations: Low Intensity Therapy  Discharge Equipment Recommendations:  other (see comments)  Barriers to discharge:  None    Assessment:     Jackelin Owens is a 89 y.o. female with a medical diagnosis of Generalized weakness.  She presents with weakness and decline with ADLs. Performance deficits affecting function are weakness, impaired endurance, impaired self care skills, impaired functional mobility, gait instability, impaired balance, decreased coordination, decreased lower extremity function, decreased safety awareness.     Rehab Prognosis:  Fair; patient would benefit from acute skilled OT services to address these deficits and reach maximum level of function.       Plan:     Patient to be seen 5 x/week to address the above listed problems via self-care/home management, therapeutic activities, therapeutic exercises  Plan of Care Expires: 02/18/24  Plan of Care Reviewed with: daughter    Subjective     Chief Complaint: Pain and weakness  Patient/Family Comments/goals: to return to prior level of function  Pain/Comfort:       Objective:     Communicated with: Nurse prior to session.  Patient found up in chair with peripheral IV upon OT entry to room.    General Precautions: Standard, fall    Orthopedic Precautions:N/A  Braces: N/A  Respiratory Status: Room air     Occupational Performance:     Bed Mobility:    Not tested     Functional Mobility/Transfers:  Patient completed Sit <> Stand Transfer with minimum assistance  with  rolling walker   Functional Mobility:     Activities of Daily Living:  Upper Body Dressing: maximal assistance to [facility] gown as a robe      [facility] 6 Click ADL:      Treatment & Education:  Pt educated on OT role/POC.   Importance of OOB activity with staff assistance.  Importance of sitting up in the chair throughout the day  as tolerated, especially for meals   Safety during functional t/f and mobility  Importance of assisting with self-care activities      Patient left up in chair with all lines intact and call button in reach    GOALS:   Multidisciplinary Problems       Occupational Therapy Goals          Problem: Occupational Therapy    Goal Priority Disciplines Outcome Interventions   Occupational Therapy Goal     OT, PT/OT Ongoing, Progressing    Description: Grooming Status:   Short Term Goal: Pt will perform grooming with Min A sitting EOB.   Long Term Goal: Pt will perform grooming/oral hygiene standing at sink with SBA.      LE dressing Status:   Short Term Goal: Pt will perform LE dressing with Min A.   Long Term Goal: Pt will perform LE dressing with SBA.    Toileting Status:   Short Term Goal: Pt will perform toilet hygiene on BSC with Min A.  Long Term Goal: Pt will perform toilet hygiene on toilet with no AE with SBA.    Commode Transfer:   Short Term Goal: Pt will perform BSC t/f with Min A.  Long Term Goal:  Pt will perform toilet t/f in bathroom with SBA.     Bathing Status:   Long Term Goal: Pt will perform sponge bath with SBA with no unsafe fatigue.     Strength Status: 5/5 BUEs  Long Term Goal: Pt to perform BUE strengthening with weights and/or body weight to increase ADL independence and safety    Endurance Status:   Short Term Goal:pt to perform 15 min OT treatment with 5 or greater rest breaks  Long Term Goal: pt to perform 30 min OT treat with 3 or less rest breaks                          Time Tracking:     OT Date of Treatment: 01/24/24  OT Start Time: 1027  OT Stop Time: 1115  OT Total Time (min): 48 min    Billable Minutes:Therapeutic Activity 20 min  Therapeutic Exercise 28 min    PARISA Thorpe/L, [facility]  OT/CUCA: OT          1/24/2024

## 2024-01-25 LAB
ANION GAP SERPL CALCULATED.3IONS-SCNC: 8 MMOL/L (ref 7–16)
BASOPHILS # BLD AUTO: 0.02 K/UL (ref 0–0.2)
BASOPHILS NFR BLD AUTO: 0.3 % (ref 0–1)
BUN SERPL-MCNC: 23 MG/DL (ref 7–18)
BUN/CREAT SERPL: 26 (ref 6–20)
CALCIUM SERPL-MCNC: 9.9 MG/DL (ref 8.5–10.1)
CHLORIDE SERPL-SCNC: 106 MMOL/L (ref 98–107)
CO2 SERPL-SCNC: 29 MMOL/L (ref 21–32)
CREAT SERPL-MCNC: 0.89 MG/DL (ref 0.55–1.02)
DIFFERENTIAL METHOD BLD: ABNORMAL
EGFR (NO RACE VARIABLE) (RUSH/TITUS): 62 ML/MIN/1.73M2
EOSINOPHIL # BLD AUTO: 0.15 K/UL (ref 0–0.5)
EOSINOPHIL NFR BLD AUTO: 2 % (ref 1–4)
EOSINOPHIL NFR BLD MANUAL: 5 % (ref 1–4)
ERYTHROCYTE [DISTWIDTH] IN BLOOD BY AUTOMATED COUNT: 14 % (ref 11.5–14.5)
GLUCOSE SERPL-MCNC: 106 MG/DL (ref 70–105)
GLUCOSE SERPL-MCNC: 211 MG/DL (ref 70–105)
GLUCOSE SERPL-MCNC: 250 MG/DL (ref 70–105)
GLUCOSE SERPL-MCNC: 257 MG/DL (ref 70–105)
GLUCOSE SERPL-MCNC: 489 MG/DL (ref 70–105)
GLUCOSE SERPL-MCNC: 99 MG/DL (ref 74–106)
HCT VFR BLD AUTO: 38.3 % (ref 38–47)
HGB BLD-MCNC: 12.6 G/DL (ref 12–16)
LYMPHOCYTES # BLD AUTO: 2.12 K/UL (ref 1–4.8)
LYMPHOCYTES NFR BLD AUTO: 28.5 % (ref 27–41)
LYMPHOCYTES NFR BLD MANUAL: 29 % (ref 27–41)
MCH RBC QN AUTO: 28.1 PG (ref 27–31)
MCHC RBC AUTO-ENTMCNC: 32.9 G/DL (ref 32–36)
MCV RBC AUTO: 85.5 FL (ref 80–96)
MONOCYTES # BLD AUTO: 1.56 K/UL (ref 0–0.8)
MONOCYTES NFR BLD AUTO: 20.9 % (ref 2–6)
MONOCYTES NFR BLD MANUAL: 20 % (ref 2–6)
MPC BLD CALC-MCNC: 11.4 FL (ref 9.4–12.4)
NEUTROPHILS # BLD AUTO: 3.6 K/UL (ref 1.8–7.7)
NEUTROPHILS NFR BLD AUTO: 48.3 % (ref 53–65)
NEUTS SEG NFR BLD MANUAL: 46 % (ref 50–62)
NRBC BLD MANUAL-RTO: ABNORMAL %
OVALOCYTES BLD QL SMEAR: ABNORMAL
PLATELET # BLD AUTO: 226 K/UL (ref 150–400)
PLATELET MORPHOLOGY: NORMAL
POTASSIUM SERPL-SCNC: 3.8 MMOL/L (ref 3.5–5.1)
RBC # BLD AUTO: 4.48 M/UL (ref 4.2–5.4)
SODIUM SERPL-SCNC: 139 MMOL/L (ref 136–145)
WBC # BLD AUTO: 7.45 K/UL (ref 4.5–11)

## 2024-01-25 PROCEDURE — 97530 THERAPEUTIC ACTIVITIES: CPT | Mod: CQ

## 2024-01-25 PROCEDURE — 63600175 PHARM REV CODE 636 W HCPCS: Performed by: NURSE PRACTITIONER

## 2024-01-25 PROCEDURE — 97535 SELF CARE MNGMENT TRAINING: CPT

## 2024-01-25 PROCEDURE — 27000944

## 2024-01-25 PROCEDURE — 85025 COMPLETE CBC W/AUTO DIFF WBC: CPT | Performed by: NURSE PRACTITIONER

## 2024-01-25 PROCEDURE — 97116 GAIT TRAINING THERAPY: CPT | Mod: CQ

## 2024-01-25 PROCEDURE — 94761 N-INVAS EAR/PLS OXIMETRY MLT: CPT

## 2024-01-25 PROCEDURE — 80048 BASIC METABOLIC PNL TOTAL CA: CPT | Performed by: NURSE PRACTITIONER

## 2024-01-25 PROCEDURE — 25000003 PHARM REV CODE 250: Performed by: NURSE PRACTITIONER

## 2024-01-25 PROCEDURE — 97530 THERAPEUTIC ACTIVITIES: CPT

## 2024-01-25 PROCEDURE — 11000004 HC SNF PRIVATE

## 2024-01-25 PROCEDURE — 63600175 PHARM REV CODE 636 W HCPCS: Performed by: FAMILY MEDICINE

## 2024-01-25 PROCEDURE — 27000982 HC MATTRESS, MATRIX LAL RENTAL

## 2024-01-25 PROCEDURE — 82962 GLUCOSE BLOOD TEST: CPT

## 2024-01-25 PROCEDURE — 99900035 HC TECH TIME PER 15 MIN (STAT)

## 2024-01-25 RX ADMIN — VALACYCLOVIR HYDROCHLORIDE 1000 MG: 500 TABLET, FILM COATED ORAL at 09:01

## 2024-01-25 RX ADMIN — METOPROLOL TARTRATE 50 MG: 50 TABLET, FILM COATED ORAL at 09:01

## 2024-01-25 RX ADMIN — OXYCODONE HYDROCHLORIDE AND ACETAMINOPHEN 1000 MG: 500 TABLET ORAL at 09:01

## 2024-01-25 RX ADMIN — OMEGA-3 FATTY ACIDS CAP 1000 MG 1 CAPSULE: 1000 CAP at 09:01

## 2024-01-25 RX ADMIN — HYDRALAZINE HYDROCHLORIDE 25 MG: 25 TABLET ORAL at 09:01

## 2024-01-25 RX ADMIN — THEOPHYLLINE ANHYDROUS 200 MG: 200 CAPSULE, EXTENDED RELEASE ORAL at 09:01

## 2024-01-25 RX ADMIN — MELATONIN TAB 3 MG 6 MG: 3 TAB at 09:01

## 2024-01-25 RX ADMIN — SENNOSIDES AND DOCUSATE SODIUM 1 TABLET: 8.6; 5 TABLET ORAL at 09:01

## 2024-01-25 RX ADMIN — INSULIN ASPART 3 UNITS: 100 INJECTION, SOLUTION INTRAVENOUS; SUBCUTANEOUS at 09:01

## 2024-01-25 RX ADMIN — LORAZEPAM 0.5 MG: 0.5 TABLET ORAL at 01:01

## 2024-01-25 RX ADMIN — Medication 400 MG: at 09:01

## 2024-01-25 RX ADMIN — FAMOTIDINE 20 MG: 20 TABLET ORAL at 09:01

## 2024-01-25 RX ADMIN — ENOXAPARIN SODIUM 30 MG: 30 INJECTION SUBCUTANEOUS at 04:01

## 2024-01-25 RX ADMIN — INSULIN ASPART 4 UNITS: 100 INJECTION, SOLUTION INTRAVENOUS; SUBCUTANEOUS at 04:01

## 2024-01-25 RX ADMIN — LEVOTHYROXINE SODIUM 50 MCG: 50 TABLET ORAL at 06:01

## 2024-01-25 RX ADMIN — CHOLECALCIFEROL TAB 25 MCG (1000 UNIT) 2000 UNITS: 25 TAB at 08:01

## 2024-01-25 RX ADMIN — INSULIN ASPART 4 UNITS: 100 INJECTION, SOLUTION INTRAVENOUS; SUBCUTANEOUS at 11:01

## 2024-01-25 RX ADMIN — ZINC SULFATE 220 MG (50 MG) CAPSULE 220 MG: CAPSULE at 08:01

## 2024-01-25 RX ADMIN — INSULIN DETEMIR 10 UNITS: 100 INJECTION, SOLUTION SUBCUTANEOUS at 09:01

## 2024-01-25 RX ADMIN — LORAZEPAM 0.5 MG: 0.5 TABLET ORAL at 12:01

## 2024-01-25 RX ADMIN — FLUTICASONE PROPIONATE 100 MCG: 50 SPRAY, METERED NASAL at 09:01

## 2024-01-25 NOTE — PT/OT/SLP PROGRESS
Physical Therapy Treatment    Patient Name:  Jackelin Owens   MRN:  [id number]    Recommendations:     Discharge Recommendations: Low Intensity Therapy  Discharge Equipment Recommendations: other (see comments) (May need a rolling walker on discharge; PT will continue to assess.)  Barriers to discharge: Decreased caregiver support    Assessment:     Jackelin Owens is a 89 y.o. female admitted with a medical diagnosis of Generalized weakness.  She presents with the following impairments/functional limitations: weakness, impaired endurance, impaired self care skills, impaired functional mobility, gait instability, impaired balance, decreased coordination, decreased lower extremity function, decreased safety awareness Patient was willing to participate with therapy. Patient still ambulates with a slow gait, but did have improvements with ambulation distance today.     Rehab Prognosis: Fair; patient would benefit from acute skilled PT services to address these deficits and reach maximum level of function.    Recent Surgery: * No surgery found *      Plan:     During this hospitalization, patient to be seen 5 x/week to address the identified rehab impairments via gait training, therapeutic activities, therapeutic exercises, neuromuscular re-education and progress toward the following goals:    Plan of Care Expires:  02/08/24    Subjective     Chief Complaint: feeling unwell   Patient/Family Comments/goals: return back home   Pain/Comfort:  Pain Rating 1: 0/10  Pain Rating Post-Intervention 1: 0/10      Objective:     Communicated with OT prior to session.  Patient found HOB elevated with peripheral IV upon PT entry to room.     General Precautions: Standard, fall  Orthopedic Precautions: N/A  Braces: N/A  Respiratory Status: Room air     Functional Mobility:  Bed Mobility:     Supine to Sit: minimum assistance  Transfers:     Sit to Stand:  minimum assistance with rolling walker  Bed to Chair: minimum assistance with   rolling walker  using  Step Transfer  Gait: Patient ambulated 40' with min a and RW. Patient ambulated with decreased step lengths and no LOB.      AM-PAC 6 CLICK MOBILITY  Turning over in bed (including adjusting bedclothes, sheets and blankets)?: 3  Sitting down on and standing up from a chair with arms (e.g., wheelchair, bedside commode, etc.): 3  Moving from lying on back to sitting on the side of the bed?: 3  Moving to and from a bed to a chair (including a wheelchair)?: 3  Need to walk in hospital room?: 3  Climbing 3-5 steps with a railing?: 2  Basic Mobility Total Score: 17       Treatment & Education:   Transfers and ambulation as listed above.   Sit to stands - 5 reps    Patient left up in chair with call button in reach, chair alarm on, and family present..    GOALS:   Multidisciplinary Problems       Physical Therapy Goals          Problem: Physical Therapy    Goal Priority Disciplines Outcome Goal Variances Interventions   Physical Therapy Goal     PT, PT/OT Ongoing, Progressing     Description: Short-term Goals: 1.5 weeks  1. Patient will perform supine to/from sit with standby assist to improve independence and safety with bed mobility.  2. Patient will perform sit to/from stand with a rolling walker with contact guard assist to improve independence and safety with transfers.  3. Patient will ambulate 75 feet with a rolling walker with contact guard assist to improve independence and safety with gait.  4. Patient will tolerate 15 minutes of physical therapy intervention to improve endurance and activity tolerance.    Long-term goals: 3 weeks  1. Patient will perform supine to/from sit with modified independence to improve independence and safety with bed mobility.  2. Patient will perform sit to/from stand with a rolling walker with standby assist to improve independence and safety with transfers.  3. Patient will ambulate 150 feet with a rolling walker with standby assist to improve independence and  safety with gait.  4. Patient will tolerate 30 minutes of physical therapy intervention to improve endurance and activity tolerance.                         Time Tracking:     PT Received On: 01/25/24  PT Start Time: 1528     PT Stop Time: 1551  PT Total Time (min): 23 min     Billable Minutes: Gait Training 15 and Therapeutic Activity 8    Treatment Type: Treatment  PT/PTA: PTA     Number of PTA visits since last PT visit: 4   GILBERTO Edmond   01/25/2024

## 2024-01-25 NOTE — PT/OT/SLP PROGRESS
"Occupational Therapy   Treatment    Name: Jackelin Owens  MRN: [id number]  Admitting Diagnosis:  Generalized weakness       Recommendations:     Discharge Recommendations: Low Intensity Therapy  Discharge Equipment Recommendations:  other (see comments)  Barriers to discharge:  None    Assessment:     Jackelin Owens is a 89 y.o. female with a medical diagnosis of Generalized weakness.  She presents with weakness, failure to thrive symptoms, and depression symptoms. Performance deficits affecting function are weakness, impaired endurance, impaired self care skills, impaired functional mobility, gait instability, impaired balance, decreased coordination, decreased lower extremity function, decreased safety awareness. Spoke with Janie Friedman RN and told her that patient has been talking about death and leaving this world on yesterday. Patient is convinced that God gave her COVID because she did something wrong. Therapist tried to encourage patient that God doesn't work like that and patient stated, "That don't make any sense..." Patient may need clergy or counselor to come and speak with patient regarding beliefs and mentation.     Rehab Prognosis:  Good; patient would benefit from acute skilled OT services to address these deficits and reach maximum level of function.       Plan:     Patient to be seen 5 x/week to address the above listed problems via self-care/home management, therapeutic activities, therapeutic exercises  Plan of Care Expires: 02/18/24  Plan of Care Reviewed with: daughter    Subjective     Chief Complaint: Weakness and poor motivation to survive  Patient/Family Comments/goals: to return home  Pain/Comfort:       Objective:     Communicated with: Nurse prior to session.  Patient found supine with peripheral IV upon OT entry to room.    General Precautions: Standard, fall    Orthopedic Precautions:N/A  Braces: N/A  Respiratory Status: Room air     Occupational Performance:     Bed Mobility:  "   Patient completed Rolling/Turning to Left with  minimum assistance  Patient completed Rolling/Turning to Right with minimum assistance  Patient completed Scooting/Bridging with minimum assistance  Patient completed Supine to Sit with minimum assistance  Patient completed Sit to Supine with minimum assistance     Functional Mobility/Transfers:  Patient completed Sit <> Stand Transfer with minimum assistance  with  rolling walker   Patient completed Toilet Transfer Step Transfer technique with minimum assistance with  rolling walker  Functional Mobility: Patient ambulated to/from bathroom with Min A with moderate encouragement.    Activities of Daily Living:  Feeding:  supervision Patient required set up for meals and was able to feed herself with only verbal encouragement today. Patient was lolita to utilize spoon and fork to bring food to/from mouth and patient was lolita to hold a cup in hand without spilling  Grooming: stand by assistance to stand at sink and wash hands  Lower Body Dressing: moderate assistance to manage clothing after toileting  Toileting: stand by assistance to perform toilet hygiene      [facility] 6 Click ADL:      Treatment & Education:  Patient sat edge of bed and performed self feeding and transferred to/from bathroom with Min A- see above. Patient educated on the following:  Pt educated on OT role/POC.   Importance of OOB activity with staff assistance.  Importance of sitting up in the chair throughout the day as tolerated, especially for meals   Safety during functional t/f and mobility  Importance of assisting with self-care activities      Patient left HOB elevated with all lines intact and call button in reach    GOALS:   Multidisciplinary Problems       Occupational Therapy Goals          Problem: Occupational Therapy    Goal Priority Disciplines Outcome Interventions   Occupational Therapy Goal     OT, PT/OT Ongoing, Progressing    Description: Grooming Status:   Short Term Goal: Pt will  perform grooming with Min A sitting EOB.   Long Term Goal: Pt will perform grooming/oral hygiene standing at sink with SBA.      LE dressing Status:   Short Term Goal: Pt will perform LE dressing with Min A.   Long Term Goal: Pt will perform LE dressing with SBA.    Toileting Status:   Short Term Goal: Pt will perform toilet hygiene on BSC with Min A.  Long Term Goal: Pt will perform toilet hygiene on toilet with no AE with SBA.    Commode Transfer:   Short Term Goal: Pt will perform BSC t/f with Min A.  Long Term Goal:  Pt will perform toilet t/f in bathroom with SBA.     Bathing Status:   Long Term Goal: Pt will perform sponge bath with SBA with no unsafe fatigue.     Strength Status: 5/5 BUEs  Long Term Goal: Pt to perform BUE strengthening with weights and/or body weight to increase ADL independence and safety    Endurance Status:   Short Term Goal:pt to perform 15 min OT treatment with 5 or greater rest breaks  Long Term Goal: pt to perform 30 min OT treat with 3 or less rest breaks                          Time Tracking:     OT Date of Treatment: 01/25/24  OT Start Time: 1416  OT Stop Time: 1452  OT Total Time (min): 36 min    Billable Minutes:Self Care/Home Management 20 min  Therapeutic Activity 16 min    PAR[facility] Thorpe[facility]  OT/CUCA: OT          1/25/2024

## 2024-01-25 NOTE — PLAN OF CARE
Problem: Diabetes Comorbidity  Goal: Blood Glucose Level Within Targeted Range  Outcome: Ongoing, Progressing  Intervention: Monitor and Manage Glycemia  Flowsheets (Taken 1/25/2024 1609)  Glycemic Management:   blood glucose monitored   supplemental insulin given     Problem: Fall Injury Risk  Goal: Absence of Fall and Fall-Related Injury  Outcome: Ongoing, Progressing  Intervention: Identify and Manage Contributors  Flowsheets (Taken 1/25/2024 1609)  Self-Care Promotion:   independence encouraged   BADL personal objects within reach   BADL personal routines maintained   meal set-up provided   safe use of adaptive equipment encouraged     Problem: Malnutrition  Goal: Improved Nutritional Intake  Outcome: Ongoing, Progressing  Intervention: Promote and Optimize Oral Intake  Flowsheets (Taken 1/25/2024 1609)  Oral Nutrition Promotion:   calorie-dense foods provided   calorie-dense liquids provided   nutritional therapy counseling provided

## 2024-01-26 LAB
GLUCOSE SERPL-MCNC: 192 MG/DL (ref 70–105)
GLUCOSE SERPL-MCNC: 274 MG/DL (ref 70–105)
GLUCOSE SERPL-MCNC: 284 MG/DL (ref 70–105)
GLUCOSE SERPL-MCNC: 71 MG/DL (ref 70–105)

## 2024-01-26 PROCEDURE — 27000944

## 2024-01-26 PROCEDURE — 94761 N-INVAS EAR/PLS OXIMETRY MLT: CPT

## 2024-01-26 PROCEDURE — 99307 SBSQ NF CARE SF MDM 10: CPT | Mod: ,,, | Performed by: NURSE PRACTITIONER

## 2024-01-26 PROCEDURE — 63600175 PHARM REV CODE 636 W HCPCS: Performed by: FAMILY MEDICINE

## 2024-01-26 PROCEDURE — 11000004 HC SNF PRIVATE

## 2024-01-26 PROCEDURE — 82962 GLUCOSE BLOOD TEST: CPT

## 2024-01-26 PROCEDURE — 63600175 PHARM REV CODE 636 W HCPCS: Performed by: NURSE PRACTITIONER

## 2024-01-26 PROCEDURE — 97110 THERAPEUTIC EXERCISES: CPT

## 2024-01-26 PROCEDURE — 97530 THERAPEUTIC ACTIVITIES: CPT

## 2024-01-26 PROCEDURE — 25000003 PHARM REV CODE 250: Performed by: NURSE PRACTITIONER

## 2024-01-26 PROCEDURE — 27000982 HC MATTRESS, MATRIX LAL RENTAL

## 2024-01-26 PROCEDURE — 99900035 HC TECH TIME PER 15 MIN (STAT)

## 2024-01-26 PROCEDURE — 97535 SELF CARE MNGMENT TRAINING: CPT

## 2024-01-26 RX ORDER — IBUPROFEN 200 MG
24 TABLET ORAL
Status: DISCONTINUED | OUTPATIENT
Start: 2024-01-26 | End: 2024-02-06 | Stop reason: HOSPADM

## 2024-01-26 RX ORDER — INSULIN ASPART 100 [IU]/ML
0-5 INJECTION, SOLUTION INTRAVENOUS; SUBCUTANEOUS
Status: DISCONTINUED | OUTPATIENT
Start: 2024-01-26 | End: 2024-02-06 | Stop reason: HOSPADM

## 2024-01-26 RX ORDER — GLUCAGON 1 MG
1 KIT INJECTION
Status: DISCONTINUED | OUTPATIENT
Start: 2024-01-26 | End: 2024-02-06 | Stop reason: HOSPADM

## 2024-01-26 RX ORDER — IBUPROFEN 200 MG
16 TABLET ORAL
Status: DISCONTINUED | OUTPATIENT
Start: 2024-01-26 | End: 2024-02-06 | Stop reason: HOSPADM

## 2024-01-26 RX ADMIN — SENNOSIDES AND DOCUSATE SODIUM 1 TABLET: 8.6; 5 TABLET ORAL at 09:01

## 2024-01-26 RX ADMIN — INSULIN ASPART 3 UNITS: 100 INJECTION, SOLUTION INTRAVENOUS; SUBCUTANEOUS at 12:01

## 2024-01-26 RX ADMIN — HYDRALAZINE HYDROCHLORIDE 25 MG: 25 TABLET ORAL at 08:01

## 2024-01-26 RX ADMIN — ENOXAPARIN SODIUM 30 MG: 30 INJECTION SUBCUTANEOUS at 04:01

## 2024-01-26 RX ADMIN — Medication 400 MG: at 09:01

## 2024-01-26 RX ADMIN — OXYCODONE HYDROCHLORIDE AND ACETAMINOPHEN 1000 MG: 500 TABLET ORAL at 08:01

## 2024-01-26 RX ADMIN — LEVOTHYROXINE SODIUM 50 MCG: 50 TABLET ORAL at 06:01

## 2024-01-26 RX ADMIN — ACETAMINOPHEN 650 MG: 325 TABLET ORAL at 09:01

## 2024-01-26 RX ADMIN — THEOPHYLLINE ANHYDROUS 200 MG: 200 CAPSULE, EXTENDED RELEASE ORAL at 09:01

## 2024-01-26 RX ADMIN — FAMOTIDINE 20 MG: 20 TABLET ORAL at 09:01

## 2024-01-26 RX ADMIN — THEOPHYLLINE ANHYDROUS 200 MG: 200 CAPSULE, EXTENDED RELEASE ORAL at 08:01

## 2024-01-26 RX ADMIN — FLUTICASONE PROPIONATE 100 MCG: 50 SPRAY, METERED NASAL at 09:01

## 2024-01-26 RX ADMIN — CHOLECALCIFEROL TAB 25 MCG (1000 UNIT) 2000 UNITS: 25 TAB at 09:01

## 2024-01-26 RX ADMIN — INSULIN ASPART 1 UNITS: 100 INJECTION, SOLUTION INTRAVENOUS; SUBCUTANEOUS at 09:01

## 2024-01-26 RX ADMIN — INSULIN DETEMIR 10 UNITS: 100 INJECTION, SOLUTION SUBCUTANEOUS at 08:01

## 2024-01-26 RX ADMIN — METOPROLOL TARTRATE 50 MG: 50 TABLET, FILM COATED ORAL at 09:01

## 2024-01-26 RX ADMIN — VALACYCLOVIR HYDROCHLORIDE 1000 MG: 500 TABLET, FILM COATED ORAL at 09:01

## 2024-01-26 RX ADMIN — ZINC SULFATE 220 MG (50 MG) CAPSULE 220 MG: CAPSULE at 09:01

## 2024-01-26 RX ADMIN — OXYCODONE HYDROCHLORIDE AND ACETAMINOPHEN 1000 MG: 500 TABLET ORAL at 09:01

## 2024-01-26 RX ADMIN — OMEGA-3 FATTY ACIDS CAP 1000 MG 1 CAPSULE: 1000 CAP at 09:01

## 2024-01-26 RX ADMIN — HYDRALAZINE HYDROCHLORIDE 25 MG: 25 TABLET ORAL at 09:01

## 2024-01-26 NOTE — ASSESSMENT & PLAN NOTE
01/18/24 PT and OT to evaluate and treat   Brown show that she lives alone. She is household ambulator with rollator.      01/22/24 walked 16 feet with therapist during last session, continue therapy for strengthening    01/24/24 walked 30 feet with therapist yesterday    01/26/24 walked 40 feet with therapist yesterday

## 2024-01-26 NOTE — PROGRESS NOTES
[facility] - Medical Surgical Unit  Hospital Medicine  Progress Note    Patient Name: Jackelni Owens  MRN: [id number]  Patient Class: IP- Swing   Admission Date: 1/18/2024  Length of Stay: 8 days  Attending Physician: Home Alexis IV, DO  Primary Care Provider: Maria A, Primary Doctor        Subjective:     Principal Problem:Generalized weakness        HPI:  Jackelin Owens is an 89 year old female with pmh of hypertension, type 2 diabetes mellitus, hypothyroidism and interstitial lung disease.Records show that she was diagnosed with covid and flu approx 2 1/2 weeks ago. She was not prescribed tamiflu or paxlovid but was given an antibiotic. She presented to ER at [facility] with increasing SOB on 01/14/2024. Admitted and treated for covid 19 pneumonia. She tested positive for covid while in [facility] ER. She was treated with Levaquin and remdesevir, supplemental o2 . She has been working with therapy on strengthening but continues to have weakness. She has been accepted to [facility] for swing bed placement       01/18/24 VS on arrival are T98.1-87-/80 sat 95% room air. She is awake, alert, oriented. Accompanied by family members. Complains of occasional productive cough, weakness and poor appetite. Discussed code status and she chose DNI.    Overview/Hospital Course:  01/22/24 Awake and resting in bed. States she does not want to have anymore labs drawn, states she was stuck multiple times this morning and she cannot handle it. Complains of pain left flank radiating to back. States this started yesterday. States pain is at level 10. States area is itchy and painful. Noted small cluster rash left mammary fold- no blisters yet. Will start gabapentin and valcyclovir. Chest is clear. Sat on room air is 95%.   1200 reported to me that Mrs. Owens was complaining of chest pain and that she was tachycardic at rate of 127. Went to room to see her. She complained of having chest pain. States chest  feels heavy. Asked her when this pain began and she states it has been ongoing. Asked her if she had this pain yesterday, she replied she did. Asked her if she had it last week, she again replied that she did- that it has been ongoing. She also complains of having back pain as well -left upper back. She complains of needing to have BM but states she cannot. Complains that she has to urinate. Took her to bathroom , she urinated and had small BM. EKG and troponin ordered. EKG shows sinus tachycardia. Troponin normal at 9.6.   1235 Dr. Alexis here to assess pt and heard expiratory wheezing. Chest xray pending. Duonebs ordered prn. Solumedrol 60 mg IV every 8 hours ordered. Sat on room air is 97%.    1250 Mrs. Owens states she is feeling better. Sat is 97%.    1300 Went back to check on Mrs. Owens and she states she cannot breath. Sat is 97%. Chest xray pending. BNP pending .Telemetry shows sinus tachycardia with rate from 120s to 130s.  States she just wants to die. To just let her die.     1335 Sat continues to be 97% on room air. Heart rate 130s- will give metoprolol po and monitor.     1345 Daughter Clau Nair is in room and Mrs. Campos is much more calm. Heart rate down to 114. Continue to monitor on telemetry.      01/23/24 Awake and resting in bed. Complains of being hungry this morning. Blood glucose increased to over 300- increased levemir to 10 units nightly. Will decrease steroids. No complaints of SOB or chest pain this morning. Does report pain to left flank radiating to back. Continue valcyclovir.      01/23/24 nurse reports poct glucose over 500 - will have lab to verify- increase to moderate SSI. Levemir increased to 10 units nightly. Methylprednisone dcd      01/24/24 Awake and complaining of frontal headache. Tylenol given for relief. Blood glucose this morning is 115.continue levemir and mod SSI.     01/26/24 Awake and sitting up in bed . States she is feeling better this morning. Walked 40 feet  with therapist yesterday. Complains of dry skin. Will give moisturizing lotion to use prn.    Interval History: weakness    Review of Systems   Constitutional:  Negative for appetite change, fatigue and fever.        Appetite gradually increasing   HENT:  Negative for sore throat and trouble swallowing.    Eyes:  Negative for redness.   Respiratory:  Negative for cough, choking, chest tightness and shortness of breath.    Gastrointestinal:  Negative for abdominal distention, abdominal pain, constipation, diarrhea, nausea and vomiting.   Genitourinary:  Negative for dysuria.   Musculoskeletal:  Negative for arthralgias, back pain, neck pain and neck stiffness.   Skin:  Negative for pallor and wound.        Complains of dry skin   Neurological:  Positive for weakness. Negative for light-headedness and headaches.     Objective:     Vital Signs (Most Recent):  Temp: 97.4 °F (36.3 °C) (01/26/24 0728)  Pulse: 84 (01/26/24 0728)  Resp: 18 (01/26/24 0728)  BP: (!) 162/78 (01/26/24 0728)  SpO2: 96 % (01/26/24 0728) Vital Signs (24h Range):  Temp:  [97.4 °F (36.3 °C)-98.3 °F (36.8 °C)] 97.4 °F (36.3 °C)  Pulse:  [66-90] 84  Resp:  [16-20] 18  SpO2:  [95 %-96 %] 96 %  BP: (138-162)/(69-78) 162/78     Weight: 50.5 kg (111 lb 6.4 oz)  Body mass index is 20.38 kg/m².    Intake/Output Summary (Last 24 hours) at 1/26/2024 0759  Last data filed at 1/25/2024 1748  Gross per 24 hour   Intake 240 ml   Output 4 ml   Net 236 ml         Physical Exam  HENT:      Head: Normocephalic.      Mouth/Throat:      Mouth: Mucous membranes are moist.   Cardiovascular:      Rate and Rhythm: Normal rate. Rhythm irregular.      Pulses: Normal pulses.      Heart sounds: No murmur heard.     No friction rub. No gallop.   Pulmonary:      Effort: Pulmonary effort is normal. No respiratory distress.      Breath sounds: Normal breath sounds. No wheezing or rhonchi.      Comments: Sat on room air is 95%  Abdominal:      General: Bowel sounds are normal.  There is no distension.      Palpations: Abdomen is soft. There is no mass.      Tenderness: There is no abdominal tenderness. There is no guarding or rebound.      Hernia: No hernia is present.   Musculoskeletal:         General: Normal range of motion.      Cervical back: Normal range of motion.   Skin:     General: Skin is warm and dry.      Coloration: Skin is not pale.      Findings: No erythema, lesion or rash.   Neurological:      Mental Status: She is alert and oriented to person, place, and time.      Motor: Weakness present.             Significant Labs: All pertinent labs within the past 24 hours have been reviewed.  Recent Lab Results         01/26/24  0625   01/25/24  2103   01/25/24  1633   01/25/24  1117        POC Glucose 71   257   250   211               Significant Imaging: I have reviewed all pertinent imaging results/findings within the past 24 hours.    Assessment/Plan:      * Generalized weakness  01/18/24 PT and OT to evaluate and treat   Reocrds show that she lives alone. She is household ambulator with rollator.      01/22/24 walked 16 feet with therapist during last session, continue therapy for strengthening    01/24/24 walked 30 feet with therapist yesterday    01/26/24 walked 40 feet with therapist yesterday    Type 2 diabetes mellitus  Home med is glimepiride 4 mg po bid   Last A1c reviewed- ordered  Current correctional scale  Low  Maintain anti-hyperglycemic dose as follows-   Antihyperglycemics (From admission, onward)      Start     Stop Route Frequency Ordered    01/23/24 2100  insulin detemir U-100 injection 10 Units         -- SubQ Nightly 01/23/24 0727    01/23/24 1252  insulin aspart U-100 injection 0-10 Units         -- SubQ Before meals & nightly PRN 01/23/24 1153          Hold Oral hypoglycemics while patient is in the hospital.    Accuchecks ac and hs with low dose SSI coverage       01/22/24 add 5 units levemir at hs     01/23/24 increase levemir to 10 units   Increased  SSI to moderate  Dcd steroids    01/24/24 Blood glucose 115 this morning      01/26/24 continue levemir and SSI       Hypertension  01/18/24 continue lopressor 50 mg po daily     And apresoline 25 mg po BID      While in the hospital, will manage blood pressure as follows; Continue home antihypertensive regimen    Will utilize p.r.n. blood pressure medication only if patient's blood pressure greater than 180/110 and she develops symptoms such as worsening chest pain or shortness of breath.      01/23/24 stable     Interstitial lung disease  01/18/24 continue theophylline 200 mg po BID       01/24/23 decrease steroids, duonebs prn wheezing    01/26/24 sat on room air is 96%    Hypothyroidism  01/18/24 continue levothyroxine 50 mcg daily      01/24/23 continue levothyroxine    Poor appetite    01/18/24 diabetic diet   Ensure supplement TID and prn     GERD (gastroesophageal reflux disease)  01/18/24 continue famotidine        Shingles    01/22/24 start valacyclovir 1000 mg TID x 7 days  Start gabapentin      01/23/24 continue valacyclovir 1000 mg daily      VTE Risk Mitigation (From admission, onward)           Ordered     enoxaparin injection 30 mg  Every 24 hours         01/19/24 0706     IP VTE LOW RISK PATIENT  Once         01/18/24 1434     Place CARLOS hose  Until discontinued         01/18/24 1434                    Discharge Planning   CHELSEA: 2/8/2024     Code Status: Partial Code   Is the patient medically ready for discharge?:     Reason for patient still in hospital (select all that apply): Treatment  Discharge Plan A: Home with family                  MARCELINA Mir  Department of Hospital Medicine   [facility] - Medical Surgical Unit

## 2024-01-26 NOTE — PT/OT/SLP PROGRESS
"Physical Therapy      Patient Name:  Jackelin Owens   MRN:  [id number]    Patient not seen today secondary to Patient unwilling to participate. Patient's therapy was attempted at 1509 and 1535 with patient being asleep both attempts. Patient's daughter requested therapist to come back with first attempt, but requested therapist to wake patient up in the second attempt. Once therapist awoken patient she reported, "I do not want to do therapy". Patient's daughter attempted to motivate patient, but patient became very emotional and reported, "do not wake me up again, I do not ever want to wake up". Will follow-up 01/29/2024.    GILBERTO Edmond  01/26/2024  "

## 2024-01-26 NOTE — PLAN OF CARE
[facility] - Medical Surgical Unit  Discharge Assessment    Primary Care Provider: No, Primary Doctor     Discharge Assessment (most recent)       BRIEF DISCHARGE ASSESSMENT - 01/26/24 1013          Discharge Planning    Assessment Type Discharge Planning Brief Assessment (P)      Resource/Environmental Concerns none (P)      Support Systems Family members (P)      Current Living Arrangements home (P)      Patient/Family Anticipates Transition to home with help/services (P)      Patient/Family Anticipated Services at Transition home health care (P)      DME Needed Upon Discharge  bedside commode (P)      Discharge Plan A Home;Home Health (P)      Discharge Plan B New Nursing Home placement - [facility] facility (P)                      Visited with Ms. Owens and her daughter, Iesha.  Talked about discharge plans and Iesha says she prefers her mother stays here and gets back to her old self but if not, we, her mother and siblings, may have to talk about admission to [facility].  She asked me to contact the admission coordinator for some questions she had.  I attempted to talk with Robyn Balderrama @ [employer] to give her Iesha's phone number, message left for her to return call to [facility].

## 2024-01-26 NOTE — PLAN OF CARE
Problem: Adult Inpatient Plan of Care  Goal: Plan of Care Review  Outcome: Ongoing, Progressing  Goal: Patient-Specific Goal (Individualized)  Outcome: Ongoing, Progressing  Goal: Absence of Hospital-Acquired Illness or Injury  Outcome: Ongoing, Progressing  Goal: Optimal Comfort and Wellbeing  Outcome: Ongoing, Progressing  Goal: Readiness for Transition of Care  Outcome: Ongoing, Progressing     Problem: Diabetes Comorbidity  Goal: Blood Glucose Level Within Targeted Range  Outcome: Ongoing, Progressing     Problem: Skin Injury Risk Increased  Goal: Skin Health and Integrity  Outcome: Ongoing, Progressing     Problem: Fall Injury Risk  Goal: Absence of Fall and Fall-Related Injury  Outcome: Ongoing, Progressing     Problem: Oral Intake Inadequate  Goal: Improved Oral Intake  Outcome: Ongoing, Progressing     Problem: Malnutrition  Goal: Improved Nutritional Intake  Outcome: Ongoing, Progressing     Problem: Chest Pain  Goal: Resolution of Chest Pain Symptoms  Outcome: Ongoing, Progressing     Problem: Violence Risk or Actual  Goal: Anger and Impulse Control  Outcome: Ongoing, Progressing

## 2024-01-26 NOTE — ASSESSMENT & PLAN NOTE
01/18/24 continue theophylline 200 mg po BID       01/24/23 decrease steroids, duonebs prn wheezing    01/26/24 sat on room air is 96%

## 2024-01-26 NOTE — SUBJECTIVE & OBJECTIVE
Interval History: weakness    Review of Systems   Constitutional:  Negative for appetite change, fatigue and fever.        Appetite gradually increasing   HENT:  Negative for sore throat and trouble swallowing.    Eyes:  Negative for redness.   Respiratory:  Negative for cough, choking, chest tightness and shortness of breath.    Gastrointestinal:  Negative for abdominal distention, abdominal pain, constipation, diarrhea, nausea and vomiting.   Genitourinary:  Negative for dysuria.   Musculoskeletal:  Negative for arthralgias, back pain, neck pain and neck stiffness.   Skin:  Negative for pallor and wound.        Complains of dry skin   Neurological:  Positive for weakness. Negative for light-headedness and headaches.     Objective:     Vital Signs (Most Recent):  Temp: 97.4 °F (36.3 °C) (01/26/24 0728)  Pulse: 84 (01/26/24 0728)  Resp: 18 (01/26/24 0728)  BP: (!) 162/78 (01/26/24 0728)  SpO2: 96 % (01/26/24 0728) Vital Signs (24h Range):  Temp:  [97.4 °F (36.3 °C)-98.3 °F (36.8 °C)] 97.4 °F (36.3 °C)  Pulse:  [66-90] 84  Resp:  [16-20] 18  SpO2:  [95 %-96 %] 96 %  BP: (138-162)/(69-78) 162/78     Weight: 50.5 kg (111 lb 6.4 oz)  Body mass index is 20.38 kg/m².    Intake/Output Summary (Last 24 hours) at 1/26/2024 0759  Last data filed at 1/25/2024 1748  Gross per 24 hour   Intake 240 ml   Output 4 ml   Net 236 ml         Physical Exam  HENT:      Head: Normocephalic.      Mouth/Throat:      Mouth: Mucous membranes are moist.   Cardiovascular:      Rate and Rhythm: Normal rate. Rhythm irregular.      Pulses: Normal pulses.      Heart sounds: No murmur heard.     No friction rub. No gallop.   Pulmonary:      Effort: Pulmonary effort is normal. No respiratory distress.      Breath sounds: Normal breath sounds. No wheezing or rhonchi.      Comments: Sat on room air is 95%  Abdominal:      General: Bowel sounds are normal. There is no distension.      Palpations: Abdomen is soft. There is no mass.      Tenderness: There  is no abdominal tenderness. There is no guarding or rebound.      Hernia: No hernia is present.   Musculoskeletal:         General: Normal range of motion.      Cervical back: Normal range of motion.   Skin:     General: Skin is warm and dry.      Coloration: Skin is not pale.      Findings: No erythema, lesion or rash.   Neurological:      Mental Status: She is alert and oriented to person, place, and time.      Motor: Weakness present.             Significant Labs: All pertinent labs within the past 24 hours have been reviewed.  Recent Lab Results         01/26/24  0625   01/25/24  2103   01/25/24  1633   01/25/24  1117        POC Glucose 71   257   250   211               Significant Imaging: I have reviewed all pertinent imaging results/findings within the past 24 hours.

## 2024-01-26 NOTE — ASSESSMENT & PLAN NOTE
Home med is glimepiride 4 mg po bid   Last A1c reviewed- ordered  Current correctional scale  Low  Maintain anti-hyperglycemic dose as follows-   Antihyperglycemics (From admission, onward)      Start     Stop Route Frequency Ordered    01/23/24 2100  insulin detemir U-100 injection 10 Units         -- SubQ Nightly 01/23/24 0727    01/23/24 1252  insulin aspart U-100 injection 0-10 Units         -- SubQ Before meals & nightly PRN 01/23/24 1153          Hold Oral hypoglycemics while patient is in the hospital.    Accuchecks ac and hs with low dose SSI coverage       01/22/24 add 5 units levemir at hs     01/23/24 increase levemir to 10 units   Increased SSI to moderate  Dcd steroids    01/24/24 Blood glucose 115 this morning      01/26/24 continue levemir and SSI

## 2024-01-26 NOTE — PT/OT/SLP PROGRESS
Occupational Therapy   Treatment    Name: Jackelin Owens  MRN: [id number]  Admitting Diagnosis:  Generalized weakness       Recommendations:     Discharge Recommendations: Low Intensity Therapy  Discharge Equipment Recommendations:  other (see comments)  Barriers to discharge:  None    Assessment:     Jackelin Owens is a 89 y.o. female with a medical diagnosis of Generalized weakness.  She presents with weakness and decline with ADLs. Performance deficits affecting function are weakness, impaired endurance, impaired self care skills, impaired functional mobility, gait instability, impaired balance, decreased coordination, decreased lower extremity function, decreased safety awareness.     Rehab Prognosis:  Fair; patient would benefit from acute skilled OT services to address these deficits and reach maximum level of function.       Plan:     Patient to be seen 5 x/week to address the above listed problems via self-care/home management, therapeutic activities, therapeutic exercises  Plan of Care Expires: 02/18/24  Plan of Care Reviewed with: daughter    Subjective     Chief Complaint: Weakness  Patient/Family Comments/goals: to return to prior level of function  Pain/Comfort:       Objective:     Communicated with: Nurse prior to session.  Patient found supine with peripheral IV upon OT entry to room.    General Precautions: Standard, fall    Orthopedic Precautions:N/A  Braces: N/A  Respiratory Status: Room air     Occupational Performance:     Bed Mobility:    Patient completed Rolling/Turning to Left with  minimum assistance  Patient completed Rolling/Turning to Right with minimum assistance  Patient completed Scooting/Bridging with minimum assistance  Patient completed Supine to Sit with minimum assistance  Patient completed Sit to Supine with minimum assistance     Functional Mobility/Transfers:  Patient completed Sit <> Stand Transfer with minimum assistance  with  rolling walker   Patient completed Bed <> Chair  Transfer using Step Transfer technique with minimum assistance with rolling walker  Functional Mobility: Patient transferred within room with Min A    Activities of Daily Living:  Upper Body Dressing: moderate assistance to pennie as a robe      [facility] 6 Click ADL:      Treatment & Education:  Pt performed B UE strengthening exercises to include:   Shoulder flexion   Chest press    Elbow flexion   Elbow extension   Pectoral stretches   Bilateral rowing  All exercises performed 3x10 reps using 2# weight and red theraband.     Patient left HOB elevated with all lines intact and call button in reach    GOALS:   Multidisciplinary Problems       Occupational Therapy Goals          Problem: Occupational Therapy    Goal Priority Disciplines Outcome Interventions   Occupational Therapy Goal     OT, PT/OT Ongoing, Progressing    Description: Grooming Status:   Short Term Goal: Pt will perform grooming with Min A sitting EOB.   Long Term Goal: Pt will perform grooming/oral hygiene standing at sink with SBA.      LE dressing Status:   Short Term Goal: Pt will perform LE dressing with Min A.   Long Term Goal: Pt will perform LE dressing with SBA.    Toileting Status:   Short Term Goal: Pt will perform toilet hygiene on BSC with Min A.  Long Term Goal: Pt will perform toilet hygiene on toilet with no AE with SBA.    Commode Transfer:   Short Term Goal: Pt will perform BSC t/f with Min A.  Long Term Goal:  Pt will perform toilet t/f in bathroom with SBA.     Bathing Status:   Long Term Goal: Pt will perform sponge bath with SBA with no unsafe fatigue.     Strength Status: 5/5 BUEs  Long Term Goal: Pt to perform BUE strengthening with weights and/or body weight to increase ADL independence and safety    Endurance Status:   Short Term Goal:pt to perform 15 min OT treatment with 5 or greater rest breaks  Long Term Goal: pt to perform 30 min OT treat with 3 or less rest breaks                          Time Tracking:     OT Date of  Treatment: 01/26/24  OT Start Time: 1100  OT Stop Time: 1148  OT Total Time (min): 48 min    Billable Minutes:Self Care/Home Management 20 min  Therapeutic Activity 10 min  Therapeutic Exercise 18 min      PARISA Thorpe/L, CSRS  OT/CUCA: OT          1/26/2024

## 2024-01-27 LAB
GLUCOSE SERPL-MCNC: 134 MG/DL (ref 70–105)
GLUCOSE SERPL-MCNC: 166 MG/DL (ref 70–105)
GLUCOSE SERPL-MCNC: 170 MG/DL (ref 70–105)
GLUCOSE SERPL-MCNC: 196 MG/DL (ref 70–105)

## 2024-01-27 PROCEDURE — 27000944

## 2024-01-27 PROCEDURE — 82962 GLUCOSE BLOOD TEST: CPT

## 2024-01-27 PROCEDURE — 63600175 PHARM REV CODE 636 W HCPCS: Performed by: NURSE PRACTITIONER

## 2024-01-27 PROCEDURE — 94761 N-INVAS EAR/PLS OXIMETRY MLT: CPT

## 2024-01-27 PROCEDURE — 27000982 HC MATTRESS, MATRIX LAL RENTAL

## 2024-01-27 PROCEDURE — 25000003 PHARM REV CODE 250: Performed by: NURSE PRACTITIONER

## 2024-01-27 PROCEDURE — 99900035 HC TECH TIME PER 15 MIN (STAT)

## 2024-01-27 PROCEDURE — 11000004 HC SNF PRIVATE

## 2024-01-27 PROCEDURE — 63600175 PHARM REV CODE 636 W HCPCS: Performed by: FAMILY MEDICINE

## 2024-01-27 RX ADMIN — ENOXAPARIN SODIUM 30 MG: 30 INJECTION SUBCUTANEOUS at 04:01

## 2024-01-27 RX ADMIN — OXYCODONE HYDROCHLORIDE AND ACETAMINOPHEN 1000 MG: 500 TABLET ORAL at 08:01

## 2024-01-27 RX ADMIN — LORAZEPAM 0.5 MG: 0.5 TABLET ORAL at 04:01

## 2024-01-27 RX ADMIN — SENNOSIDES AND DOCUSATE SODIUM 1 TABLET: 8.6; 5 TABLET ORAL at 08:01

## 2024-01-27 RX ADMIN — METOPROLOL TARTRATE 50 MG: 50 TABLET, FILM COATED ORAL at 08:01

## 2024-01-27 RX ADMIN — HYDRALAZINE HYDROCHLORIDE 25 MG: 25 TABLET ORAL at 08:01

## 2024-01-27 RX ADMIN — FLUTICASONE PROPIONATE 100 MCG: 50 SPRAY, METERED NASAL at 08:01

## 2024-01-27 RX ADMIN — INSULIN DETEMIR 10 UNITS: 100 INJECTION, SOLUTION SUBCUTANEOUS at 08:01

## 2024-01-27 RX ADMIN — THEOPHYLLINE ANHYDROUS 200 MG: 200 CAPSULE, EXTENDED RELEASE ORAL at 08:01

## 2024-01-27 RX ADMIN — Medication 400 MG: at 08:01

## 2024-01-27 RX ADMIN — FAMOTIDINE 20 MG: 20 TABLET ORAL at 08:01

## 2024-01-27 RX ADMIN — GABAPENTIN 100 MG: 100 CAPSULE ORAL at 08:01

## 2024-01-27 NOTE — NURSING
"CNA sit breakfast up for patient. Upon entering room, patient was sitting on side of bed but was laying on her side. Tried to help her to sit up and eat but she kept repeating "Can't get up", "Can't get up". I helped her sit up but she looked at me and screamed that she did not want to sit up she wanted to lay down. Got her to take some of her meds with some yogurt but she got angry and kicked the bedside table away and almost spilled food on the floor. Patient was put back to bed with bed alarm and chair alarm on. Call bell in reach. Will cont to monitor.    "

## 2024-01-27 NOTE — NURSING
"Patient stated at lunch "I can't feed myself". Asked her if she wanted me to help her and patient refused. Tray was set up across patient and attempted to feed her but she became angry and pushed the tray away from her. Patient stated that we are not helping her. Explained to her that we are doing everything for her but she refuses our help. Will cont to monitor.   "

## 2024-01-27 NOTE — NURSING
"I educated patient on medication, levothyroxine. Patient stated "I don't care." When asked if she would take her medication, patient responded "No.". Patient will open eyes and look at me when I request her to but immediately closes her eyes.   "

## 2024-01-28 LAB
GLUCOSE SERPL-MCNC: 100 MG/DL (ref 70–105)
GLUCOSE SERPL-MCNC: 204 MG/DL (ref 70–105)
GLUCOSE SERPL-MCNC: 246 MG/DL (ref 70–105)
GLUCOSE SERPL-MCNC: 268 MG/DL (ref 70–105)

## 2024-01-28 PROCEDURE — 27000944

## 2024-01-28 PROCEDURE — 63600175 PHARM REV CODE 636 W HCPCS: Performed by: FAMILY MEDICINE

## 2024-01-28 PROCEDURE — 25000003 PHARM REV CODE 250: Performed by: NURSE PRACTITIONER

## 2024-01-28 PROCEDURE — 63600175 PHARM REV CODE 636 W HCPCS: Performed by: NURSE PRACTITIONER

## 2024-01-28 PROCEDURE — 27000982 HC MATTRESS, MATRIX LAL RENTAL

## 2024-01-28 PROCEDURE — 94761 N-INVAS EAR/PLS OXIMETRY MLT: CPT

## 2024-01-28 PROCEDURE — 11000004 HC SNF PRIVATE

## 2024-01-28 PROCEDURE — 99900035 HC TECH TIME PER 15 MIN (STAT)

## 2024-01-28 PROCEDURE — 82962 GLUCOSE BLOOD TEST: CPT

## 2024-01-28 RX ADMIN — METOPROLOL TARTRATE 50 MG: 50 TABLET, FILM COATED ORAL at 08:01

## 2024-01-28 RX ADMIN — OMEGA-3 FATTY ACIDS CAP 1000 MG 1 CAPSULE: 1000 CAP at 08:01

## 2024-01-28 RX ADMIN — SENNOSIDES AND DOCUSATE SODIUM 1 TABLET: 8.6; 5 TABLET ORAL at 09:01

## 2024-01-28 RX ADMIN — OXYCODONE HYDROCHLORIDE AND ACETAMINOPHEN 1000 MG: 500 TABLET ORAL at 08:01

## 2024-01-28 RX ADMIN — SENNOSIDES AND DOCUSATE SODIUM 1 TABLET: 8.6; 5 TABLET ORAL at 08:01

## 2024-01-28 RX ADMIN — ENOXAPARIN SODIUM 30 MG: 30 INJECTION SUBCUTANEOUS at 04:01

## 2024-01-28 RX ADMIN — THEOPHYLLINE ANHYDROUS 200 MG: 200 CAPSULE, EXTENDED RELEASE ORAL at 08:01

## 2024-01-28 RX ADMIN — Medication 400 MG: at 08:01

## 2024-01-28 RX ADMIN — INSULIN ASPART 2 UNITS: 100 INJECTION, SOLUTION INTRAVENOUS; SUBCUTANEOUS at 04:01

## 2024-01-28 RX ADMIN — INSULIN DETEMIR 10 UNITS: 100 INJECTION, SOLUTION SUBCUTANEOUS at 09:01

## 2024-01-28 RX ADMIN — HYDRALAZINE HYDROCHLORIDE 25 MG: 25 TABLET ORAL at 09:01

## 2024-01-28 RX ADMIN — FAMOTIDINE 20 MG: 20 TABLET ORAL at 08:01

## 2024-01-28 RX ADMIN — FLUTICASONE PROPIONATE 100 MCG: 50 SPRAY, METERED NASAL at 08:01

## 2024-01-28 RX ADMIN — INSULIN ASPART 3 UNITS: 100 INJECTION, SOLUTION INTRAVENOUS; SUBCUTANEOUS at 11:01

## 2024-01-28 RX ADMIN — LEVOTHYROXINE SODIUM 50 MCG: 50 TABLET ORAL at 06:01

## 2024-01-28 RX ADMIN — HYDRALAZINE HYDROCHLORIDE 25 MG: 25 TABLET ORAL at 08:01

## 2024-01-28 RX ADMIN — OXYCODONE HYDROCHLORIDE AND ACETAMINOPHEN 1000 MG: 500 TABLET ORAL at 09:01

## 2024-01-28 RX ADMIN — THEOPHYLLINE ANHYDROUS 200 MG: 200 CAPSULE, EXTENDED RELEASE ORAL at 09:01

## 2024-01-28 RX ADMIN — INSULIN ASPART 1 UNITS: 100 INJECTION, SOLUTION INTRAVENOUS; SUBCUTANEOUS at 09:01

## 2024-01-28 NOTE — PLAN OF CARE
Problem: Adult Inpatient Plan of Care  Goal: Plan of Care Review  Outcome: Ongoing, Progressing  Flowsheets (Taken 1/27/2024 2222)  Plan of Care Reviewed With: patient  Goal: Patient-Specific Goal (Individualized)  Outcome: Ongoing, Progressing  Flowsheets (Taken 1/27/2024 2222)  Individualized Care Needs: Patient willingness to work on goals for increasing strength X 24 hours  Goal: Absence of Hospital-Acquired Illness or Injury  Outcome: Ongoing, Progressing  Intervention: Identify and Manage Fall Risk  Flowsheets (Taken 1/27/2024 2222)  Safety Promotion/Fall Prevention:   side rails raised x 3   instructed to call staff for mobility   lighting adjusted   medications reviewed   high risk medications identified  Intervention: Prevent Skin Injury  Flowsheets (Taken 1/27/2024 2222)  Body Position:   position changed independently   turned   neutral head position   neutral body alignment   supine  Skin Protection: incontinence pads utilized  Intervention: Prevent and Manage VTE (Venous Thromboembolism) Risk  Flowsheets (Taken 1/27/2024 2222)  Activity Management:   Heel slide - L1   Rolling - L1   Leg kicks - L2   Ambulated to bathroom - L4   Standing - L3  VTE Prevention/Management:   ambulation promoted   fluids promoted   ROM (active) performed  Range of Motion:   active ROM (range of motion) encouraged   ROM (range of motion) performed  Intervention: Prevent Infection  Flowsheets (Taken 1/27/2024 2222)  Infection Prevention:   hand hygiene promoted   rest/sleep promoted   personal protective equipment utilized   equipment surfaces disinfected   environmental surveillance performed   single patient room provided  Goal: Optimal Comfort and Wellbeing  Outcome: Ongoing, Progressing  Intervention: Monitor Pain and Promote Comfort  Flowsheets (Taken 1/27/2024 2222)  Pain Management Interventions:   pillow support provided   quiet environment facilitated   relaxation techniques promoted  Intervention: Provide  Person-Centered Care  Flowsheets (Taken 1/27/2024 2222)  Trust Relationship/Rapport:   questions encouraged   thoughts/feelings acknowledged   questions answered   choices provided   emotional support provided  Goal: Readiness for Transition of Care  Outcome: Ongoing, Progressing

## 2024-01-29 PROBLEM — B02.9 SHINGLES: Status: RESOLVED | Noted: 2024-01-22 | Resolved: 2024-01-29

## 2024-01-29 LAB
ANION GAP SERPL CALCULATED.3IONS-SCNC: 10 MMOL/L (ref 7–16)
BASOPHILS # BLD AUTO: 0.04 K/UL (ref 0–0.2)
BASOPHILS NFR BLD AUTO: 0.4 % (ref 0–1)
BUN SERPL-MCNC: 20 MG/DL (ref 7–18)
BUN/CREAT SERPL: 23 (ref 6–20)
CALCIUM SERPL-MCNC: 11.1 MG/DL (ref 8.5–10.1)
CHLORIDE SERPL-SCNC: 107 MMOL/L (ref 98–107)
CO2 SERPL-SCNC: 26 MMOL/L (ref 21–32)
CREAT SERPL-MCNC: 0.86 MG/DL (ref 0.55–1.02)
DIFFERENTIAL METHOD BLD: ABNORMAL
EGFR (NO RACE VARIABLE) (RUSH/TITUS): 65 ML/MIN/1.73M2
EOSINOPHIL # BLD AUTO: 0.24 K/UL (ref 0–0.5)
EOSINOPHIL NFR BLD AUTO: 2.5 % (ref 1–4)
EOSINOPHIL NFR BLD MANUAL: 3 % (ref 1–4)
ERYTHROCYTE [DISTWIDTH] IN BLOOD BY AUTOMATED COUNT: 14.2 % (ref 11.5–14.5)
GLUCOSE SERPL-MCNC: 199 MG/DL (ref 70–105)
GLUCOSE SERPL-MCNC: 237 MG/DL (ref 70–105)
GLUCOSE SERPL-MCNC: 240 MG/DL (ref 70–105)
GLUCOSE SERPL-MCNC: 79 MG/DL (ref 74–106)
GLUCOSE SERPL-MCNC: 89 MG/DL (ref 70–105)
HCT VFR BLD AUTO: 43.9 % (ref 38–47)
HGB BLD-MCNC: 14.4 G/DL (ref 12–16)
LYMPHOCYTES # BLD AUTO: 3.55 K/UL (ref 1–4.8)
LYMPHOCYTES NFR BLD AUTO: 36.6 % (ref 27–41)
LYMPHOCYTES NFR BLD MANUAL: 36 % (ref 27–41)
MCH RBC QN AUTO: 28.4 PG (ref 27–31)
MCHC RBC AUTO-ENTMCNC: 32.8 G/DL (ref 32–36)
MCV RBC AUTO: 86.6 FL (ref 80–96)
MONOCYTES # BLD AUTO: 2.01 K/UL (ref 0–0.8)
MONOCYTES NFR BLD AUTO: 20.7 % (ref 2–6)
MONOCYTES NFR BLD MANUAL: 19 % (ref 2–6)
MPC BLD CALC-MCNC: 12.5 FL (ref 9.4–12.4)
NEUTROPHILS # BLD AUTO: 3.85 K/UL (ref 1.8–7.7)
NEUTROPHILS NFR BLD AUTO: 39.8 % (ref 53–65)
NEUTS SEG NFR BLD MANUAL: 42 % (ref 50–62)
NRBC BLD MANUAL-RTO: ABNORMAL %
PLATELET # BLD AUTO: 198 K/UL (ref 150–400)
PLATELET MORPHOLOGY: ABNORMAL
POTASSIUM SERPL-SCNC: 3.9 MMOL/L (ref 3.5–5.1)
RBC # BLD AUTO: 5.07 M/UL (ref 4.2–5.4)
SODIUM SERPL-SCNC: 139 MMOL/L (ref 136–145)
WBC # BLD AUTO: 9.69 K/UL (ref 4.5–11)

## 2024-01-29 PROCEDURE — 82962 GLUCOSE BLOOD TEST: CPT

## 2024-01-29 PROCEDURE — 97530 THERAPEUTIC ACTIVITIES: CPT

## 2024-01-29 PROCEDURE — 80048 BASIC METABOLIC PNL TOTAL CA: CPT | Performed by: NURSE PRACTITIONER

## 2024-01-29 PROCEDURE — 63600175 PHARM REV CODE 636 W HCPCS: Performed by: NURSE PRACTITIONER

## 2024-01-29 PROCEDURE — 97535 SELF CARE MNGMENT TRAINING: CPT

## 2024-01-29 PROCEDURE — 99900035 HC TECH TIME PER 15 MIN (STAT)

## 2024-01-29 PROCEDURE — 11000004 HC SNF PRIVATE

## 2024-01-29 PROCEDURE — 94761 N-INVAS EAR/PLS OXIMETRY MLT: CPT

## 2024-01-29 PROCEDURE — 85025 COMPLETE CBC W/AUTO DIFF WBC: CPT | Performed by: NURSE PRACTITIONER

## 2024-01-29 PROCEDURE — 97110 THERAPEUTIC EXERCISES: CPT

## 2024-01-29 PROCEDURE — 63600175 PHARM REV CODE 636 W HCPCS: Performed by: FAMILY MEDICINE

## 2024-01-29 PROCEDURE — 99307 SBSQ NF CARE SF MDM 10: CPT | Mod: ,,, | Performed by: NURSE PRACTITIONER

## 2024-01-29 PROCEDURE — 25000003 PHARM REV CODE 250: Performed by: NURSE PRACTITIONER

## 2024-01-29 RX ADMIN — ENOXAPARIN SODIUM 30 MG: 30 INJECTION SUBCUTANEOUS at 05:01

## 2024-01-29 RX ADMIN — HYDRALAZINE HYDROCHLORIDE 25 MG: 25 TABLET ORAL at 08:01

## 2024-01-29 RX ADMIN — SENNOSIDES AND DOCUSATE SODIUM 1 TABLET: 8.6; 5 TABLET ORAL at 09:01

## 2024-01-29 RX ADMIN — HYDRALAZINE HYDROCHLORIDE 25 MG: 25 TABLET ORAL at 09:01

## 2024-01-29 RX ADMIN — GABAPENTIN 100 MG: 100 CAPSULE ORAL at 08:01

## 2024-01-29 RX ADMIN — OXYCODONE HYDROCHLORIDE AND ACETAMINOPHEN 1000 MG: 500 TABLET ORAL at 09:01

## 2024-01-29 RX ADMIN — THEOPHYLLINE ANHYDROUS 200 MG: 200 CAPSULE, EXTENDED RELEASE ORAL at 08:01

## 2024-01-29 RX ADMIN — LEVOTHYROXINE SODIUM 50 MCG: 50 TABLET ORAL at 06:01

## 2024-01-29 RX ADMIN — ACETAMINOPHEN 650 MG: 325 TABLET ORAL at 11:01

## 2024-01-29 RX ADMIN — Medication 400 MG: at 09:01

## 2024-01-29 RX ADMIN — INSULIN ASPART 1 UNITS: 100 INJECTION, SOLUTION INTRAVENOUS; SUBCUTANEOUS at 08:01

## 2024-01-29 RX ADMIN — OXYCODONE HYDROCHLORIDE AND ACETAMINOPHEN 1000 MG: 500 TABLET ORAL at 08:01

## 2024-01-29 RX ADMIN — OMEGA-3 FATTY ACIDS CAP 1000 MG 1 CAPSULE: 1000 CAP at 09:01

## 2024-01-29 RX ADMIN — SENNOSIDES AND DOCUSATE SODIUM 1 TABLET: 8.6; 5 TABLET ORAL at 08:01

## 2024-01-29 RX ADMIN — FLUTICASONE PROPIONATE 100 MCG: 50 SPRAY, METERED NASAL at 09:01

## 2024-01-29 RX ADMIN — FAMOTIDINE 20 MG: 20 TABLET ORAL at 09:01

## 2024-01-29 RX ADMIN — INSULIN DETEMIR 10 UNITS: 100 INJECTION, SOLUTION SUBCUTANEOUS at 08:01

## 2024-01-29 RX ADMIN — THEOPHYLLINE ANHYDROUS 200 MG: 200 CAPSULE, EXTENDED RELEASE ORAL at 09:01

## 2024-01-29 RX ADMIN — INSULIN ASPART 2 UNITS: 100 INJECTION, SOLUTION INTRAVENOUS; SUBCUTANEOUS at 11:01

## 2024-01-29 RX ADMIN — METOPROLOL TARTRATE 50 MG: 50 TABLET, FILM COATED ORAL at 09:01

## 2024-01-29 NOTE — ASSESSMENT & PLAN NOTE
01/18/24 diabetic diet   Ensure supplement TID and prn       01/29/24 appetite improved, continue to supplement with boost

## 2024-01-29 NOTE — SUBJECTIVE & OBJECTIVE
Interval History: weakness    Review of Systems   Constitutional:  Negative for appetite change, fatigue and fever.        Appetite good   HENT:  Negative for sore throat and trouble swallowing.    Eyes:  Negative for redness.   Respiratory:  Negative for cough, choking, chest tightness and shortness of breath.    Gastrointestinal:  Negative for abdominal distention, abdominal pain, constipation, diarrhea, nausea and vomiting.   Genitourinary:  Negative for dysuria.   Musculoskeletal:  Negative for arthralgias, back pain, neck pain and neck stiffness.   Skin:  Negative for pallor and wound.        Complains of dry skin   Neurological:  Positive for weakness. Negative for light-headedness and headaches.     Objective:     Vital Signs (Most Recent):  Temp: 97.6 °F (36.4 °C) (01/29/24 0711)  Pulse: 100 (01/29/24 0711)  Resp: 18 (01/29/24 0711)  BP: 136/69 (01/29/24 0711)  SpO2: 95 % (01/29/24 0711) Vital Signs (24h Range):  Temp:  [97.6 °F (36.4 °C)-98.9 °F (37.2 °C)] 97.6 °F (36.4 °C)  Pulse:  [] 100  Resp:  [16-20] 18  SpO2:  [95 %-97 %] 95 %  BP: (127-174)/(65-76) 136/69     Weight: 50.5 kg (111 lb 6.4 oz)  Body mass index is 20.38 kg/m².    Intake/Output Summary (Last 24 hours) at 1/29/2024 0757  Last data filed at 1/29/2024 0425  Gross per 24 hour   Intake 950 ml   Output --   Net 950 ml         Physical Exam  HENT:      Head: Normocephalic.      Mouth/Throat:      Mouth: Mucous membranes are moist.   Cardiovascular:      Rate and Rhythm: Normal rate. Rhythm irregular.      Pulses: Normal pulses.      Heart sounds: No murmur heard.     No friction rub. No gallop.   Pulmonary:      Effort: Pulmonary effort is normal. No respiratory distress.      Breath sounds: Normal breath sounds. No wheezing or rhonchi.      Comments: Sat on room air is 95%  Abdominal:      General: Bowel sounds are normal. There is no distension.      Palpations: Abdomen is soft. There is no mass.      Tenderness: There is no abdominal  tenderness. There is no guarding or rebound.      Hernia: No hernia is present.   Musculoskeletal:         General: Normal range of motion.      Cervical back: Normal range of motion.   Skin:     General: Skin is warm and dry.      Coloration: Skin is not pale.      Findings: No erythema, lesion or rash.   Neurological:      Mental Status: She is alert and oriented to person, place, and time.      Motor: Weakness present.             Significant Labs: All pertinent labs within the past 24 hours have been reviewed.  Recent Lab Results  (Last 5 results in the past 24 hours)        01/29/24  0615   01/29/24  0415   01/28/24  2029   01/28/24  1618   01/28/24  1109        Baso #   0.04             Basophil %   0.4             Differential Method   Manual             Eos #   0.24             Eosinophil %   2.5                3             Hematocrit   43.9             Hemoglobin   14.4             Lymph #   3.55             Lymph %   36.6                36             MCH   28.4             MCHC   32.8             MCV   86.6             Mono #   2.01             Mono %   20.7                19             MPV   12.5             Neutrophils, Abs   3.85             Neutrophils Relative   39.8             nRBC               PLATELET MORPHOLOGY   Large & Giant Platelets             Platelet Count   198             POC Glucose 89     204   246   268       RBC   5.07             RDW   14.2             Segmented Neutrophils, Man %   42             WBC   9.69                                    Significant Imaging: I have reviewed all pertinent imaging results/findings within the past 24 hours.

## 2024-01-29 NOTE — PT/OT/SLP EVAL
"Attempted physical therapy treatment at 2:00 with patient found asleep.  KATIUSKA Slater reports patient had a good morning and participated with OT and has been asleep since lunch.  Therapist attempted to awaken patient for treatment but patient refuses and just shook her head and said "No" multiple times.  Will check back later this afternoon to see if patient will participate.   "

## 2024-01-29 NOTE — PROGRESS NOTES
Wt: 111#  RDN visited this a.m. and pt was calm and answered all of my questions.  She likes Breeze.  Meal intake ~70% improved.  Pt c/o lower dentures not fitting right.  At times pt gets very upset and R help from staff.  Staff is working to enc her and assist he as needed.  Last BM 1/28.  BUN 20.  Continue POC.

## 2024-01-29 NOTE — PROGRESS NOTES
[facility] - Medical Surgical Unit  Hospital Medicine  Progress Note    Patient Name: Jackelin Owens  MRN: [id number]  Patient Class: IP- Swing   Admission Date: 1/18/2024  Length of Stay: 11 days  Attending Physician: Home Alexis IV, DO  Primary Care Provider: Maria A, Primary Doctor        Subjective:     Principal Problem:Generalized weakness        HPI:  Jackelin Owens is an 89 year old female with pmh of hypertension, type 2 diabetes mellitus, hypothyroidism and interstitial lung disease.Records show that she was diagnosed with covid and flu approx 2 1/2 weeks ago. She was not prescribed tamiflu or paxlovid but was given an antibiotic. She presented to ER at [facility] with increasing SOB on 01/14/2024. Admitted and treated for covid 19 pneumonia. She tested positive for covid while in [facility] ER. She was treated with Levaquin and remdesevir, supplemental o2 . She has been working with therapy on strengthening but continues to have weakness. She has been accepted to [facility] for swing bed placement       01/18/24 VS on arrival are [id number]/80 sat 95% room air. She is awake, alert, oriented. Accompanied by family members. Complains of occasional productive cough, weakness and poor appetite. Discussed code status and she chose DNI.    Overview/Hospital Course:  01/22/24 Awake and resting in bed. States she does not want to have anymore labs drawn, states she was stuck multiple times this morning and she cannot handle it. Complains of pain left flank radiating to back. States this started yesterday. States pain is at level 10. States area is itchy and painful. Noted small cluster rash left mammary fold- no blisters yet. Will start gabapentin and valcyclovir. Chest is clear. Sat on room air is 95%.   1200 reported to me that Mrs. Owens was complaining of chest pain and that she was tachycardic at rate of 127. Went to room to see her. She complained of having chest pain. States chest  feels heavy. Asked her when this pain began and she states it has been ongoing. Asked her if she had this pain yesterday, she replied she did. Asked her if she had it last week, she again replied that she did- that it has been ongoing. She also complains of having back pain as well -left upper back. She complains of needing to have BM but states she cannot. Complains that she has to urinate. Took her to bathroom , she urinated and had small BM. EKG and troponin ordered. EKG shows sinus tachycardia. Troponin normal at 9.6.   1235 Dr. Alexis here to assess pt and heard expiratory wheezing. Chest xray pending. Duonebs ordered prn. Solumedrol 60 mg IV every 8 hours ordered. Sat on room air is 97%.    1250 Mrs. Owens states she is feeling better. Sat is 97%.    1300 Went back to check on Mrs. Owens and she states she cannot breath. Sat is 97%. Chest xray pending. BNP pending .Telemetry shows sinus tachycardia with rate from 120s to 130s.  States she just wants to die. To just let her die.     1335 Sat continues to be 97% on room air. Heart rate 130s- will give metoprolol po and monitor.     1345 Daughter Clau Nair is in room and Mrs. Campos is much more calm. Heart rate down to 114. Continue to monitor on telemetry.      01/23/24 Awake and resting in bed. Complains of being hungry this morning. Blood glucose increased to over 300- increased levemir to 10 units nightly. Will decrease steroids. No complaints of SOB or chest pain this morning. Does report pain to left flank radiating to back. Continue valcyclovir.      01/23/24 nurse reports poct glucose over 500 - will have lab to verify- increase to moderate SSI. Levemir increased to 10 units nightly. Methylprednisone dcd      01/24/24 Awake and complaining of frontal headache. Tylenol given for relief. Blood glucose this morning is 115.continue levemir and mod SSI.     01/26/24 Awake and sitting up in bed . States she is feeling better this morning. Walked 40 feet  with therapist yesterday. Complains of dry skin. Will give moisturizing lotion to use prn.      01/29/24 Awake and smiling. Mood improved. States she had really good day yesterday. Appetite has improved. Continue to work with therapy on strengthening    Interval History: weakness    Review of Systems   Constitutional:  Negative for appetite change, fatigue and fever.        Appetite good   HENT:  Negative for sore throat and trouble swallowing.    Eyes:  Negative for redness.   Respiratory:  Negative for cough, choking, chest tightness and shortness of breath.    Gastrointestinal:  Negative for abdominal distention, abdominal pain, constipation, diarrhea, nausea and vomiting.   Genitourinary:  Negative for dysuria.   Musculoskeletal:  Negative for arthralgias, back pain, neck pain and neck stiffness.   Skin:  Negative for pallor and wound.        Complains of dry skin   Neurological:  Positive for weakness. Negative for light-headedness and headaches.     Objective:     Vital Signs (Most Recent):  Temp: 97.6 °F (36.4 °C) (01/29/24 0711)  Pulse: 100 (01/29/24 0711)  Resp: 18 (01/29/24 0711)  BP: 136/69 (01/29/24 0711)  SpO2: 95 % (01/29/24 0711) Vital Signs (24h Range):  Temp:  [97.6 °F (36.4 °C)-98.9 °F (37.2 °C)] 97.6 °F (36.4 °C)  Pulse:  [] 100  Resp:  [16-20] 18  SpO2:  [95 %-97 %] 95 %  BP: (127-174)/(65-76) 136/69     Weight: 50.5 kg (111 lb 6.4 oz)  Body mass index is 20.38 kg/m².    Intake/Output Summary (Last 24 hours) at 1/29/2024 0755  Last data filed at 1/29/2024 0425  Gross per 24 hour   Intake 950 ml   Output --   Net 950 ml         Physical Exam  HENT:      Head: Normocephalic.      Mouth/Throat:      Mouth: Mucous membranes are moist.   Cardiovascular:      Rate and Rhythm: Normal rate. Rhythm irregular.      Pulses: Normal pulses.      Heart sounds: No murmur heard.     No friction rub. No gallop.   Pulmonary:      Effort: Pulmonary effort is normal. No respiratory distress.      Breath  sounds: Normal breath sounds. No wheezing or rhonchi.      Comments: Sat on room air is 95%  Abdominal:      General: Bowel sounds are normal. There is no distension.      Palpations: Abdomen is soft. There is no mass.      Tenderness: There is no abdominal tenderness. There is no guarding or rebound.      Hernia: No hernia is present.   Musculoskeletal:         General: Normal range of motion.      Cervical back: Normal range of motion.   Skin:     General: Skin is warm and dry.      Coloration: Skin is not pale.      Findings: No erythema, lesion or rash.   Neurological:      Mental Status: She is alert and oriented to person, place, and time.      Motor: Weakness present.             Significant Labs: All pertinent labs within the past 24 hours have been reviewed.  Recent Lab Results  (Last 5 results in the past 24 hours)        01/29/24  0615   01/29/24  0415   01/28/24  2029   01/28/24  1618   01/28/24  1109        Baso #   0.04             Basophil %   0.4             Differential Method   Manual             Eos #   0.24             Eosinophil %   2.5                3             Hematocrit   43.9             Hemoglobin   14.4             Lymph #   3.55             Lymph %   36.6                36             MCH   28.4             MCHC   32.8             MCV   86.6             Mono #   2.01             Mono %   20.7                19             MPV   12.5             Neutrophils, Abs   3.85             Neutrophils Relative   39.8             nRBC               PLATELET MORPHOLOGY   Large & Giant Platelets             Platelet Count   198             POC Glucose 89     204   246   268       RBC   5.07             RDW   14.2             Segmented Neutrophils, Man %   42             WBC   9.69                                    Significant Imaging: I have reviewed all pertinent imaging results/findings within the past 24 hours.    Assessment/Plan:      * Generalized weakness  01/18/24 PT and OT to evaluate and  treat   Reocrds show that she lives alone. She is household ambulator with rollator.      01/22/24 walked 16 feet with therapist during last session, continue therapy for strengthening    01/24/24 walked 30 feet with therapist yesterday    01/26/24 walked 40 feet with therapist yesterday    01/29/24 continue therapy for strengthening          Type 2 diabetes mellitus  Home med is glimepiride 4 mg po bid   Last A1c reviewed- ordered  Current correctional scale  Low  Maintain anti-hyperglycemic dose as follows-   Antihyperglycemics (From admission, onward)      Start     Stop Route Frequency Ordered    01/26/24 0902  insulin aspart U-100 injection 0-5 Units         -- SubQ Before meals & nightly PRN 01/26/24 0802    01/23/24 2100  insulin detemir U-100 injection 10 Units         -- SubQ Nightly 01/23/24 0727          Hold Oral hypoglycemics while patient is in the hospital.    Accuchecks ac and hs with low dose SSI coverage       01/22/24 add 5 units levemir at hs     01/23/24 increase levemir to 10 units   Increased SSI to moderate  Dcd steroids    01/24/24 Blood glucose 115 this morning      01/26/24 continue levemir and SSI       01/29/24 stable    Hypertension  01/18/24 continue lopressor 50 mg po daily     And apresoline 25 mg po BID      While in the hospital, will manage blood pressure as follows; Continue home antihypertensive regimen    Will utilize p.r.n. blood pressure medication only if patient's blood pressure greater than 180/110 and she develops symptoms such as worsening chest pain or shortness of breath.      01/23/24 stable     Interstitial lung disease  01/18/24 continue theophylline 200 mg po BID       01/24/23 decrease steroids, duonebs prn wheezing    01/26/24 sat on room air is 96%    Hypothyroidism  01/18/24 continue levothyroxine 50 mcg daily      01/24/23 continue levothyroxine    Poor appetite    01/18/24 diabetic diet   Ensure supplement TID and prn       01/29/24 appetite improved,  continue to supplement with boost     GERD (gastroesophageal reflux disease)  01/18/24 continue famotidine          VTE Risk Mitigation (From admission, onward)           Ordered     enoxaparin injection 30 mg  Every 24 hours         01/19/24 0706     IP VTE LOW RISK PATIENT  Once         01/18/24 1434     Place CARLOS hose  Until discontinued         01/18/24 1434                    Discharge Planning   CHELSEA: 2/8/2024     Code Status: Partial Code   Is the patient medically ready for discharge?:     Reason for patient still in hospital (select all that apply): Treatment  Discharge Plan A: Home, Home Health                  MARCELINA Mir  Department of Hospital Medicine   [facility] - Medical Surgical Unit

## 2024-01-29 NOTE — PT/OT/SLP PROGRESS
"Attempted physical therapy treatment at 2:00 with patient found asleep.  KATIUSKA Slater reports patient had a good morning and participated with OT and has been asleep since lunch.  Therapist attempted to awaken patient for treatment but patient refuses and just shook her head and said "No" multiple times.  Will check back later this afternoon to attempt tx   "

## 2024-01-29 NOTE — ASSESSMENT & PLAN NOTE
Home med is glimepiride 4 mg po bid   Last A1c reviewed- ordered  Current correctional scale  Low  Maintain anti-hyperglycemic dose as follows-   Antihyperglycemics (From admission, onward)      Start     Stop Route Frequency Ordered    01/26/24 0902  insulin aspart U-100 injection 0-5 Units         -- SubQ Before meals & nightly PRN 01/26/24 0802    01/23/24 2100  insulin detemir U-100 injection 10 Units         -- SubQ Nightly 01/23/24 0727          Hold Oral hypoglycemics while patient is in the hospital.    Accuchecks ac and hs with low dose SSI coverage       01/22/24 add 5 units levemir at hs     01/23/24 increase levemir to 10 units   Increased SSI to moderate  Dcd steroids    01/24/24 Blood glucose 115 this morning      01/26/24 continue levemir and SSI       01/29/24 stable

## 2024-01-29 NOTE — PROGRESS NOTES
Pharmacist Renal Dose Adjustment Note    Jackelin Owens is a 89 y.o. female being treated with the medication enoxaparin.    Patient Data:    Vital Signs (Most Recent):  Temp: 97.6 °F (36.4 °C) (01/29/24 0711)  Pulse: 100 (01/29/24 0711)  Resp: 18 (01/29/24 0711)  BP: 136/69 (01/29/24 0711)  SpO2: 95 % (01/29/24 0711) Vital Signs (72h Range):  Temp:  [97.4 °F (36.3 °C)-98.9 °F (37.2 °C)]   Pulse:  []   Resp:  [16-20]   BP: (127-174)/(65-95)   SpO2:  [93 %-98 %]      Recent Labs   Lab 01/23/24  0603 01/25/24  0405 01/29/24  0415   CREATININE 1.26* 0.89 0.86     Serum creatinine: 0.86 mg/dL 01/29/24 0415  Estimated creatinine clearance: 35.1 mL/min    Medication: enoxaparin 30mg under the skin every 24 hours will be changed to medication: enoxaparin 40mg under the skin every 24 hours.     Pharmacist's Name: Aleida Osborn, PharmD

## 2024-01-29 NOTE — PT/OT/SLP PROGRESS
"Occupational Therapy   Treatment    Name: Jackelin Owens  MRN: [id number]  Admitting Diagnosis:  Generalized weakness       Recommendations:     Discharge Recommendations: Low Intensity Therapy  Discharge Equipment Recommendations:  other (see comments)  Barriers to discharge:  None    Assessment:     Jackelin Owens is a 89 y.o. female with a medical diagnosis of Generalized weakness.  She presents with weakness and decline with ADLs. Performance deficits affecting function are weakness, impaired endurance, impaired self care skills, impaired functional mobility, gait instability, impaired balance, decreased coordination, decreased lower extremity function, decreased safety awareness.     Rehab Prognosis:  Good; patient would benefit from acute skilled OT services to address these deficits and reach maximum level of function.       Plan:     Patient to be seen 5 x/week to address the above listed problems via self-care/home management, therapeutic activities, therapeutic exercises  Plan of Care Expires: 02/18/24  Plan of Care Reviewed with: daughter    Subjective     Chief Complaint: Weakness  Patient/Family Comments/goals: to return to prior level of function  Pain/Comfort: No pain noted       Objective:     Communicated with: Nurse prior to session.  Patient found supine with peripheral IV upon OT entry to room. Patient mor pleasant today and responded "I'll try..." when asked to participate with therapy. Patient followed commands the entire treatment and     General Precautions: Standard, fall    Orthopedic Precautions:N/A  Braces: N/A  Respiratory Status: Room air     Occupational Performance:     Bed Mobility:    Patient completed Rolling/Turning to Left with  contact guard assistance  Patient completed Rolling/Turning to Right with contact guard assistance  Patient completed Scooting/Bridging with contact guard assistance  Patient completed Supine to Sit with contact guard assistance     Functional " Mobility/Transfers:  Patient completed Sit <> Stand Transfer with contact guard assistance  with  rolling walker   Patient completed Bed <> Chair Transfer using Step Transfer technique with contact guard assistance with rolling walker  Patient completed Toilet Transfer Step Transfer technique with contact guard assistance with  rolling walker  Functional Mobility: Patient ambulated from bed to bathroom toilet to sink with CGA to guide walker prior to therapy and walked approximately twenty feet out side of therapy room after treatment.     Activities of Daily Living:  Grooming: stand by assistance to stand at sink and wash hands  Upper Body Dressing: moderate assistance to pennie hospital gown as a robe  Lower Body Dressing: maximal assistance to pennie bilateral socks  Toileting: stand by assistance to perform toilet hygiene      [facility] 6 Click ADL:      Treatment & Education:  Pt performed B UE strengthening exercises to include:   Shoulder flexion   Chest press    Elbow flexion   Elbow extension   Pectoral stretches   Bilateral rowing  All exercises performed 2x10 reps using 1# weight and yellow theraband. Patient also performed bilateral hand strengthening with yellow theraputty to retrieve 8/10 beads from putty with Min verbal cues.     Patient left up in chair with all lines intact, call button in reach, and chair alarm on    GOALS:   Multidisciplinary Problems       Occupational Therapy Goals          Problem: Occupational Therapy    Goal Priority Disciplines Outcome Interventions   Occupational Therapy Goal     OT, PT/OT Ongoing, Progressing    Description: Grooming Status:   Short Term Goal: Pt will perform grooming with Min A sitting EOB.   Long Term Goal: Pt will perform grooming/oral hygiene standing at sink with SBA.      LE dressing Status:   Short Term Goal: Pt will perform LE dressing with Min A.   Long Term Goal: Pt will perform LE dressing with SBA.    Toileting Status:   Short Term Goal: Pt will  perform toilet hygiene on BSC with Min A.  Long Term Goal: Pt will perform toilet hygiene on toilet with no AE with SBA.    Commode Transfer:   Short Term Goal: Pt will perform BSC t/f with Min A.  Long Term Goal:  Pt will perform toilet t/f in bathroom with SBA.     Bathing Status:   Long Term Goal: Pt will perform sponge bath with SBA with no unsafe fatigue.     Strength Status: 5/5 BUEs  Long Term Goal: Pt to perform BUE strengthening with weights and/or body weight to increase ADL independence and safety    Endurance Status:   Short Term Goal:pt to perform 15 min OT treatment with 5 or greater rest breaks  Long Term Goal: pt to perform 30 min OT treat with 3 or less rest breaks                          Time Tracking:     OT Date of Treatment: 01/29/24  OT Start Time: 1011  OT Stop Time: 1102  OT Total Time (min): 51 min    Billable Minutes:Self Care/Home Management 20 min   Therapeutic Activity 11 min   Therapeutic Exercise 20 min      PARISA Thorpe/L, CSRS  OT/CUCA: OT          1/29/2024

## 2024-01-29 NOTE — ASSESSMENT & PLAN NOTE
01/18/24 PT and OT to evaluate and treat   Brown show that she lives alone. She is household ambulator with rollator.      01/22/24 walked 16 feet with therapist during last session, continue therapy for strengthening    01/24/24 walked 30 feet with therapist yesterday    01/26/24 walked 40 feet with therapist yesterday    01/29/24 continue therapy for strengthening

## 2024-01-29 NOTE — PLAN OF CARE
Problem: Adult Inpatient Plan of Care  Goal: Plan of Care Review  Outcome: Ongoing, Progressing  Goal: Patient-Specific Goal (Individualized)  Outcome: Ongoing, Progressing  Goal: Absence of Hospital-Acquired Illness or Injury  Outcome: Ongoing, Progressing  Goal: Optimal Comfort and Wellbeing  Outcome: Ongoing, Progressing  Goal: Readiness for Transition of Care  Outcome: Ongoing, Progressing     Problem: Diabetes Comorbidity  Goal: Blood Glucose Level Within Targeted Range  Outcome: Ongoing, Progressing     Problem: Skin Injury Risk Increased  Goal: Skin Health and Integrity  Outcome: Ongoing, Progressing     Problem: Fall Injury Risk  Goal: Absence of Fall and Fall-Related Injury  Outcome: Ongoing, Progressing     Problem: Oral Intake Inadequate  Goal: Improved Oral Intake  Outcome: Ongoing, Progressing     Problem: Malnutrition  Goal: Improved Nutritional Intake  Outcome: Ongoing, Progressing     Problem: Chest Pain  Goal: Resolution of Chest Pain Symptoms  Outcome: Ongoing, Progressing     Problem: Violence Risk or Actual  Goal: Anger and Impulse Control  Outcome: Ongoing, Progressing     Problem: Breathing Pattern Ineffective  Goal: Effective Breathing Pattern  Outcome: Ongoing, Progressing     Problem: Gas Exchange Impaired  Goal: Optimal Gas Exchange  Outcome: Ongoing, Progressing

## 2024-01-30 LAB
GLUCOSE SERPL-MCNC: 178 MG/DL (ref 70–105)
GLUCOSE SERPL-MCNC: 234 MG/DL (ref 70–105)
GLUCOSE SERPL-MCNC: 242 MG/DL (ref 70–105)
GLUCOSE SERPL-MCNC: 83 MG/DL (ref 70–105)

## 2024-01-30 PROCEDURE — 82962 GLUCOSE BLOOD TEST: CPT

## 2024-01-30 PROCEDURE — 63600175 PHARM REV CODE 636 W HCPCS: Performed by: NURSE PRACTITIONER

## 2024-01-30 PROCEDURE — 25000003 PHARM REV CODE 250: Performed by: NURSE PRACTITIONER

## 2024-01-30 PROCEDURE — 99308 SBSQ NF CARE LOW MDM 20: CPT | Mod: ,,, | Performed by: FAMILY MEDICINE

## 2024-01-30 PROCEDURE — 97110 THERAPEUTIC EXERCISES: CPT

## 2024-01-30 PROCEDURE — 97116 GAIT TRAINING THERAPY: CPT

## 2024-01-30 PROCEDURE — 63600175 PHARM REV CODE 636 W HCPCS: Performed by: FAMILY MEDICINE

## 2024-01-30 PROCEDURE — 97530 THERAPEUTIC ACTIVITIES: CPT

## 2024-01-30 PROCEDURE — 97535 SELF CARE MNGMENT TRAINING: CPT

## 2024-01-30 PROCEDURE — 11000004 HC SNF PRIVATE

## 2024-01-30 PROCEDURE — 94761 N-INVAS EAR/PLS OXIMETRY MLT: CPT

## 2024-01-30 RX ORDER — HYDROXYZINE HYDROCHLORIDE 10 MG/1
10 TABLET, FILM COATED ORAL 3 TIMES DAILY PRN
Status: DISCONTINUED | OUTPATIENT
Start: 2024-01-30 | End: 2024-02-06 | Stop reason: HOSPADM

## 2024-01-30 RX ADMIN — INSULIN ASPART 2 UNITS: 100 INJECTION, SOLUTION INTRAVENOUS; SUBCUTANEOUS at 12:01

## 2024-01-30 RX ADMIN — Medication 400 MG: at 09:01

## 2024-01-30 RX ADMIN — THEOPHYLLINE ANHYDROUS 200 MG: 200 CAPSULE, EXTENDED RELEASE ORAL at 09:01

## 2024-01-30 RX ADMIN — METOPROLOL TARTRATE 50 MG: 50 TABLET, FILM COATED ORAL at 09:01

## 2024-01-30 RX ADMIN — HYDRALAZINE HYDROCHLORIDE 25 MG: 25 TABLET ORAL at 09:01

## 2024-01-30 RX ADMIN — LEVOTHYROXINE SODIUM 50 MCG: 50 TABLET ORAL at 06:01

## 2024-01-30 RX ADMIN — ENOXAPARIN SODIUM 30 MG: 30 INJECTION SUBCUTANEOUS at 04:01

## 2024-01-30 RX ADMIN — INSULIN DETEMIR 10 UNITS: 100 INJECTION, SOLUTION SUBCUTANEOUS at 09:01

## 2024-01-30 RX ADMIN — SENNOSIDES AND DOCUSATE SODIUM 1 TABLET: 8.6; 5 TABLET ORAL at 09:01

## 2024-01-30 RX ADMIN — FLUTICASONE PROPIONATE 100 MCG: 50 SPRAY, METERED NASAL at 09:01

## 2024-01-30 RX ADMIN — OMEGA-3 FATTY ACIDS CAP 1000 MG 1 CAPSULE: 1000 CAP at 09:01

## 2024-01-30 RX ADMIN — OXYCODONE HYDROCHLORIDE AND ACETAMINOPHEN 1000 MG: 500 TABLET ORAL at 09:01

## 2024-01-30 RX ADMIN — HYDROXYZINE HYDROCHLORIDE 10 MG: 10 TABLET ORAL at 12:01

## 2024-01-30 RX ADMIN — GABAPENTIN 100 MG: 100 CAPSULE ORAL at 09:01

## 2024-01-30 RX ADMIN — FAMOTIDINE 20 MG: 20 TABLET ORAL at 09:01

## 2024-01-30 RX ADMIN — LORAZEPAM 0.5 MG: 0.5 TABLET ORAL at 12:01

## 2024-01-30 RX ADMIN — INSULIN ASPART 1 UNITS: 100 INJECTION, SOLUTION INTRAVENOUS; SUBCUTANEOUS at 09:01

## 2024-01-30 NOTE — PLAN OF CARE
[facility] - Medical Surgical Unit - Swing Bed   Interdisciplinary Team Meeting    Patient: Jackelin Owens   Today's Date: 1/30/2024   Estimated D/C Date: 2/8/2024        Physician: Home Alexis MD Nurse Practitioner: Andreina Tomlinson NP   Pharmacy: Aleida Branstetter, PharmD Unit Director: GLENIS Arechiga   :  Janie Friedman RN Physical/Occupational Therapy:  José Manuel Davila PT   Speech Therapy: SAJI Activity Therapy: Heather Benavides LPN   Nursing: GLENIS Arechiga  Respiratory: Genoveva Owen, RT Dietary: Claudio Friedman  Other:      Nursing  New Symptoms/Problems:   Last Bowel Movement: 01/29/24 (per charting)   Urine: continent  Benjamin: No   Bowel: continent  Constipated: No  Diarrhea: No   Isolation: No  Cognition: WNL  Aspiration Precautions:No  Wound Care: No  Wound Location/Tx: N/A  Comment(s):     Respiratory   O2 Device: Room Air  O2 Flow:   SpO2: 95%  Neb Tx: Yes  Comment(s): Nebulizer treatments ordered for prn use     Dietary  Nutrition:  Dysphagia Mechanical Soft with Ground Meats  Comment(s): Boost Breeze @ L/ Boost Glucose Control W/ Meals    Speech Therapy  Speech/Swallowing: No current speech or swallowing issues  Comment(s):     Physical Therapy  Gait/Assistive Device: 50' W/RW W CGA vs Min A ELOS: Plan to DC 2/8/2024    Transfers: Contact Guard Assistance  Bed Mobility: Minimal Assistance Range of Motion/Restrictions: N/A  Comment(s):      Occupational Therapy  Eating/Grooming: Standby Assistance Toileting: Independent   Bathing: Moderate Assistance Dressing (Upper Body): Moderate Assistance   Dressing (Lower Body): Maximum Assistance Comment(s):      Activity Therapy  Level of participation: Active participation  Comment(s):     Pharmacy  Medication Changes (see MD orders in chart): Yes  Labs Reviewed: Yes  New Lab Orders: Yes  Comment(s): Labs ordered every M/Th      Tx Plan/Recommendations reviewed with family and/or patient on (date)  01/26/2024.  Additional family Conference/Training: home safety and fall risks  D/C Plan/Recommendations: Home with HH and Home with family  CHELSEA: 2/8/2024  Comment(s): Patient and family unsure of definite discharge plan at this time.  Will continue to have conversations with patient and her daughter.

## 2024-01-30 NOTE — PROGRESS NOTES
[facility] - Medical Surgical [facility]  Hospital Medicine  Progress Note    Patient Name: Jackelin Owens  MRN: [id number]  Patient Class: IP- Swing   Admission Date: 1/18/2024  Length of Stay: 12 days  Attending Physician: Home Alexis IV, DO  Primary Care Provider: Maria A, Primary Doctor        Subjective:     Principal Problem:Generalized weakness    [facility] - Medical Surgical [facility]        HPI:  Jackelin Owens is an 89 year old female with pmh of hypertension, type 2 diabetes mellitus, hypothyroidism and interstitial lung disease.Records show that she was diagnosed with covid and flu approx 2 1/2 weeks ago. She was not prescribed tamiflu or paxlovid but was given an antibiotic. She presented to ER at [facility] with increasing SOB on 01/14/2024. Admitted and treated for covid 19 pneumonia. She tested positive for covid while in [facility] ER. She was treated with Levaquin and remdesevir, supplemental o2 . She has been working with therapy on strengthening but continues to have weakness. She has been accepted to [facility] for swing bed placement       01/18/24 VS on arrival are T98.1-87-/80 sat 95% room air. She is awake, alert, oriented. Accompanied by family members. Complains of occasional productive cough, weakness and poor appetite. Discussed code status and she chose DNI.    Overview/Hospital Course:  01/22/24 Awake and resting in bed. States she does not want to have anymore labs drawn, states she was stuck multiple times this morning and she cannot handle it. Complains of pain left flank radiating to back. States this started yesterday. States pain is at level 10. States area is itchy and painful. Noted small cluster rash left mammary fold- no blisters yet. Will start gabapentin and valcyclovir. Chest is clear. Sat on room air is 95%.   1200 reported to me that Mrs. Owens was complaining of chest pain and that she was tachycardic at rate of 127. Went to room to see her.  She complained of having chest pain. States chest feels heavy. Asked her when this pain began and she states it has been ongoing. Asked her if she had this pain yesterday, she replied she did. Asked her if she had it last week, she again replied that she did- that it has been ongoing. She also complains of having back pain as well -left upper back. She complains of needing to have BM but states she cannot. Complains that she has to urinate. Took her to bathroom , she urinated and had small BM. EKG and troponin ordered. EKG shows sinus tachycardia. Troponin normal at 9.6.   1235 Dr. Alexis here to assess pt and heard expiratory wheezing. Chest xray pending. Duonebs ordered prn. Solumedrol 60 mg IV every 8 hours ordered. Sat on room air is 97%.    1250 Mrs. Owens states she is feeling better. Sat is 97%.    1300 Went back to check on Mrs. Owens and she states she cannot breath. Sat is 97%. Chest xray pending. BNP pending .Telemetry shows sinus tachycardia with rate from 120s to 130s.  States she just wants to die. To just let her die.     1335 Sat continues to be 97% on room air. Heart rate 130s- will give metoprolol po and monitor.     1345 Daughter Clau Nair is in room and Mrs. Campos is much more calm. Heart rate down to 114. Continue to monitor on telemetry.      01/23/24 Awake and resting in bed. Complains of being hungry this morning. Blood glucose increased to over 300- increased levemir to 10 units nightly. Will decrease steroids. No complaints of SOB or chest pain this morning. Does report pain to left flank radiating to back. Continue valcyclovir.      01/23/24 nurse reports poct glucose over 500 - will have lab to verify- increase to moderate SSI. Levemir increased to 10 units nightly. Methylprednisone dcd      01/24/24 Awake and complaining of frontal headache. Tylenol given for relief. Blood glucose this morning is 115.continue levemir and mod SSI.     01/26/24 Awake and sitting up in bed . States  "she is feeling better this morning. Walked 40 feet with therapist yesterday. Complains of dry skin. Will give moisturizing lotion to use prn.      01/29/24 Awake and smiling. Mood improved. States she had really good day yesterday. Appetite has improved. Continue to work with therapy on strengthening      01/30/24 Awake and sitting on side of the bed. Complained od felling "woozy headed and itching." No rash noted but scratch marks seen to right posterior flank. VS stable. Applie moisturizing lotion and she states skin felt much better. Review of meds shows that she had lorazepam last night which could be causing her symptoms of felling " woozy."  Will add atarax for itcing prn.    Interval History: itching to axillas, back   Feeling"woozy"    Review of Systems   Constitutional:  Negative for appetite change, fatigue and fever.        Appetite good   HENT:  Negative for sore throat and trouble swallowing.    Eyes:  Negative for redness.   Respiratory:  Negative for cough, choking, chest tightness and shortness of breath.    Gastrointestinal:  Negative for abdominal distention, abdominal pain, constipation, diarrhea, nausea and vomiting.   Genitourinary:  Negative for dysuria.   Musculoskeletal:  Negative for arthralgias, back pain, neck pain and neck stiffness.   Skin:  Negative for pallor and wound.        Complains of itching to axillas and back    Neurological:  Positive for weakness. Negative for light-headedness and headaches.        Reports feeling "woozy headed"     Objective:     Vital Signs (Most Recent):  Temp: 96.5 °F (35.8 °C) (01/30/24 0746)  Pulse: 85 (01/30/24 0746)  Resp: 16 (01/30/24 0746)  BP: (!) 145/69 (01/30/24 0746)  SpO2: 95 % (01/30/24 0746) Vital Signs (24h Range):  Temp:  [96.5 °F (35.8 °C)-97.7 °F (36.5 °C)] 96.5 °F (35.8 °C)  Pulse:  [72-94] 85  Resp:  [16-20] 16  SpO2:  [94 %-98 %] 95 %  BP: (122-145)/(64-70) 145/69     Weight: 50.5 kg (111 lb 6.4 oz)  Body mass index is 20.38 " kg/m².    Intake/Output Summary (Last 24 hours) at 1/30/2024 1027  Last data filed at 1/30/2024 0911  Gross per 24 hour   Intake 540 ml   Output --   Net 540 ml         Physical Exam  HENT:      Head: Normocephalic.      Mouth/Throat:      Mouth: Mucous membranes are moist.   Cardiovascular:      Rate and Rhythm: Normal rate. Rhythm irregular.      Pulses: Normal pulses.      Heart sounds: No murmur heard.     No friction rub. No gallop.   Pulmonary:      Effort: Pulmonary effort is normal. No respiratory distress.      Breath sounds: Normal breath sounds. No wheezing or rhonchi.      Comments: Sat on room air is 95%  Abdominal:      General: Bowel sounds are normal. There is no distension.      Palpations: Abdomen is soft. There is no mass.      Tenderness: There is no abdominal tenderness. There is no guarding or rebound.      Hernia: No hernia is present.   Musculoskeletal:         General: Normal range of motion.      Cervical back: Normal range of motion.   Skin:     General: Skin is warm and dry.      Coloration: Skin is not pale.      Findings: No erythema, lesion or rash.   Neurological:      Mental Status: She is alert and oriented to person, place, and time.      Motor: Weakness present.             Significant Labs: All pertinent labs within the past 24 hours have been reviewed.  Recent Lab Results         01/30/24  0609   01/29/24  2039   01/29/24  1611   01/29/24  1127        POC Glucose 83   240   199   237               Significant Imaging: I have reviewed all pertinent imaging results/findings within the past 24 hours.    Assessment/Plan:      * Generalized weakness  01/18/24 PT and OT to evaluate and treat   Reocrds show that she lives alone. She is household ambulator with rollator.      01/22/24 walked 16 feet with therapist during last session, continue therapy for strengthening    01/24/24 walked 30 feet with therapist yesterday    01/26/24 walked 40 feet with therapist yesterday    01/29/24  continue therapy for strengthening      01/30/24 worked with OT yesterday        Type 2 diabetes mellitus  Home med is glimepiride 4 mg po bid   Last A1c reviewed- ordered  Current correctional scale  Low  Maintain anti-hyperglycemic dose as follows-   Antihyperglycemics (From admission, onward)      Start     Stop Route Frequency Ordered    01/26/24 0902  insulin aspart U-100 injection 0-5 Units         -- SubQ Before meals & nightly PRN 01/26/24 0802    01/23/24 2100  insulin detemir U-100 injection 10 Units         -- SubQ Nightly 01/23/24 0727          Hold Oral hypoglycemics while patient is in the hospital.    Accuchecks ac and hs with low dose SSI coverage       01/22/24 add 5 units levemir at hs     01/23/24 increase levemir to 10 units   Increased SSI to moderate  Dcd steroids    01/24/24 Blood glucose 115 this morning      01/26/24 continue levemir and SSI       01/29/24 stable    Hypertension  01/18/24 continue lopressor 50 mg po daily     And apresoline 25 mg po BID      While in the hospital, will manage blood pressure as follows; Continue home antihypertensive regimen    Will utilize p.r.n. blood pressure medication only if patient's blood pressure greater than 180/110 and she develops symptoms such as worsening chest pain or shortness of breath.      01/23/24 stable     01/30/24 stable    Interstitial lung disease  01/18/24 continue theophylline 200 mg po BID       01/24/23 decrease steroids, duonebs prn wheezing    01/26/24 sat on room air is 96%    Hypothyroidism  01/18/24 continue levothyroxine 50 mcg daily      01/24/23 continue levothyroxine      01/30/24 continue levothyroxine     Poor appetite    01/18/24 diabetic diet   Ensure supplement TID and prn       01/29/24 appetite improved, continue to supplement with boost       01/30/24 appetite improved    GERD (gastroesophageal reflux disease)  01/18/24 continue famotidine        Herpes zoster without complication    01/22/24 start valacyclovir  1000 mg TID x 7 days  Start gabapentin      01/23/24 continue valacyclovir 1000 mg daily      VTE Risk Mitigation (From admission, onward)           Ordered     enoxaparin injection 30 mg  Every 24 hours         01/19/24 0706     IP VTE LOW RISK PATIENT  Once         01/18/24 1434     Place CARLOS hose  Until discontinued         01/18/24 1434                    Discharge Planning   CHELSEA: 2/8/2024     Code Status: Partial Code   Is the patient medically ready for discharge?:     Reason for patient still in hospital (select all that apply): Treatment  Discharge Plan A: Home, Home Health                  Home Alexis IV, DO  Department of Hospital Medicine   [facility] - Medical Surgical Unit

## 2024-01-30 NOTE — PLAN OF CARE
Problem: Adult Inpatient Plan of Care  Goal: Plan of Care Review  Outcome: Ongoing, Progressing  Goal: Patient-Specific Goal (Individualized)  Outcome: Ongoing, Progressing  Goal: Absence of Hospital-Acquired Illness or Injury  Outcome: Ongoing, Progressing  Goal: Optimal Comfort and Wellbeing  Outcome: Ongoing, Progressing     Problem: Skin Injury Risk Increased  Goal: Skin Health and Integrity  Outcome: Ongoing, Progressing     Problem: Fall Injury Risk  Goal: Absence of Fall and Fall-Related Injury  Outcome: Ongoing, Progressing     Problem: Chest Pain  Goal: Resolution of Chest Pain Symptoms  Outcome: Ongoing, Progressing     Problem: Breathing Pattern Ineffective  Goal: Effective Breathing Pattern  Outcome: Ongoing, Progressing     Problem: Gas Exchange Impaired  Goal: Optimal Gas Exchange  Outcome: Ongoing, Progressing

## 2024-01-30 NOTE — ASSESSMENT & PLAN NOTE
01/18/24 continue levothyroxine 50 mcg daily      01/24/23 continue levothyroxine      01/30/24 continue levothyroxine

## 2024-01-30 NOTE — SUBJECTIVE & OBJECTIVE
"Interval History: itching to axillas, back   Feeling"woozy"    Review of Systems   Constitutional:  Negative for appetite change, fatigue and fever.        Appetite good   HENT:  Negative for sore throat and trouble swallowing.    Eyes:  Negative for redness.   Respiratory:  Negative for cough, choking, chest tightness and shortness of breath.    Gastrointestinal:  Negative for abdominal distention, abdominal pain, constipation, diarrhea, nausea and vomiting.   Genitourinary:  Negative for dysuria.   Musculoskeletal:  Negative for arthralgias, back pain, neck pain and neck stiffness.   Skin:  Negative for pallor and wound.        Complains of itching to axillas and back    Neurological:  Positive for weakness. Negative for light-headedness and headaches.        Reports feeling "woozy headed"     Objective:     Vital Signs (Most Recent):  Temp: 96.5 °F (35.8 °C) (01/30/24 0746)  Pulse: 85 (01/30/24 0746)  Resp: 16 (01/30/24 0746)  BP: (!) 145/69 (01/30/24 0746)  SpO2: 95 % (01/30/24 0746) Vital Signs (24h Range):  Temp:  [96.5 °F (35.8 °C)-97.7 °F (36.5 °C)] 96.5 °F (35.8 °C)  Pulse:  [72-94] 85  Resp:  [16-20] 16  SpO2:  [94 %-98 %] 95 %  BP: (122-145)/(64-70) 145/69     Weight: 50.5 kg (111 lb 6.4 oz)  Body mass index is 20.38 kg/m².    Intake/Output Summary (Last 24 hours) at 1/30/2024 1027  Last data filed at 1/30/2024 0911  Gross per 24 hour   Intake 540 ml   Output --   Net 540 ml         Physical Exam  HENT:      Head: Normocephalic.      Mouth/Throat:      Mouth: Mucous membranes are moist.   Cardiovascular:      Rate and Rhythm: Normal rate. Rhythm irregular.      Pulses: Normal pulses.      Heart sounds: No murmur heard.     No friction rub. No gallop.   Pulmonary:      Effort: Pulmonary effort is normal. No respiratory distress.      Breath sounds: Normal breath sounds. No wheezing or rhonchi.      Comments: Sat on room air is 95%  Abdominal:      General: Bowel sounds are normal. There is no distension. "      Palpations: Abdomen is soft. There is no mass.      Tenderness: There is no abdominal tenderness. There is no guarding or rebound.      Hernia: No hernia is present.   Musculoskeletal:         General: Normal range of motion.      Cervical back: Normal range of motion.   Skin:     General: Skin is warm and dry.      Coloration: Skin is not pale.      Findings: No erythema, lesion or rash.   Neurological:      Mental Status: She is alert and oriented to person, place, and time.      Motor: Weakness present.             Significant Labs: All pertinent labs within the past 24 hours have been reviewed.  Recent Lab Results         01/30/24  0609   01/29/24  2039   01/29/24  1611   01/29/24  1127        POC Glucose 83   240   199   237               Significant Imaging: I have reviewed all pertinent imaging results/findings within the past 24 hours.

## 2024-01-30 NOTE — PT/OT/SLP PROGRESS
"Occupational Therapy   Treatment    Name: Jackelin Owens  MRN: [id number]  Admitting Diagnosis:  Generalized weakness       Recommendations:     Discharge Recommendations: Low Intensity Therapy  Discharge Equipment Recommendations:  other (see comments)  Barriers to discharge:  None    Assessment:     Jackelin Owens is a 89 y.o. female with a medical diagnosis of Generalized weakness.  She presents with weakness and decline with ADLs. Patient awake, alert, and cheerful upon arrival of Occupational Therapy. Patient has a more positive outlook in life now and states that she think she wants to live. Patient praised Jose Elias during Occupational therapy treatment and joyful cried stating how good he was! Performance deficits affecting function are weakness, impaired endurance, impaired self care skills, impaired functional mobility, gait instability, impaired balance, decreased coordination, decreased lower extremity function, decreased safety awareness.     Rehab Prognosis:  Good; patient would benefit from acute skilled OT services to address these deficits and reach maximum level of function.       Plan:     Patient to be seen 5 x/week to address the above listed problems via self-care/home management, therapeutic activities, therapeutic exercises  Plan of Care Expires: 02/18/24  Plan of Care Reviewed with: daughter    Subjective     Chief Complaint: Weakness  Patient/Family Comments/goals: to return to prior level of function  Pain/Comfort: No pain noted       Objective:     Communicated with: Nurse prior to session.  Patient found supine with peripheral IV upon OT entry to room. Patient mor pleasant today and responded "I'll try..." when asked to participate with therapy. Patient followed commands the entire treatment today with no hesitation.     General Precautions: Standard, fall    Orthopedic Precautions:N/A  Braces: N/A  Respiratory Status: Room air     Occupational Performance:     Bed Mobility:    Patient " completed Rolling/Turning to Left with  stand by assistance  Patient completed Rolling/Turning to Right with stand by assistance  Patient completed Scooting/Bridging with minimum assistance  Patient completed Supine to Sit with minimum assistance     Functional Mobility/Transfers:  Patient completed Sit <> Stand Transfer with contact guard assistance  with  rolling walker   Patient completed Bed <> Chair Transfer using Step Transfer technique with contact guard assistance with rolling walker  Patient completed Toilet Transfer Step Transfer technique with contact guard assistance with  rolling walker  Functional Mobility: Patient walked from bed to bathroom toilet to sink with stand by assistance to guide walker prior to therapy and walked approximately twenty feet outside of room to chair.     Activities of Daily Living:  Grooming: stand by assistance to stand at sink and wash hands  Upper Body Dressing: minimum assistance to [facility]ie hospital gown as a robe  Lower Body Dressing: moderate assistance to pennie bilateral socks  Toileting: modified independence to perform toilet hygiene      [facility] 6 Click ADL:      Treatment & Education:  Pt performed B UE strengthening exercises to include:   UE bike x 5 min  Shoulder flexion   Chest press    Elbow flexion   Elbow extension   Pectoral stretches   Bilateral rowing  All exercises performed 2x10 reps using 1# weight and yellow theraband. Patient also performed bilateral hand strengthening with yellow theraputty to retrieve 10/10 beads from putty with Min verbal cues.     Patient left up in chair with all lines intact, call button in reach, and chair alarm on    GOALS:   Multidisciplinary Problems       Occupational Therapy Goals          Problem: Occupational Therapy    Goal Priority Disciplines Outcome Interventions   Occupational Therapy Goal     OT, PT/OT Ongoing, Progressing    Description: Grooming Status:   Short Term Goal: Pt will perform grooming with Min A sitting  EOB.   Long Term Goal: Pt will perform grooming/oral hygiene standing at sink with SBA.      LE dressing Status:   Short Term Goal: Pt will perform LE dressing with Min A.   Long Term Goal: Pt will perform LE dressing with SBA.    Toileting Status:   Short Term Goal: Pt will perform toilet hygiene on BSC with Min A.  Long Term Goal: Pt will perform toilet hygiene on toilet with no AE with SBA.    Commode Transfer:   Short Term Goal: Pt will perform BSC t/f with Min A.  Long Term Goal:  Pt will perform toilet t/f in bathroom with SBA.     Bathing Status:   Long Term Goal: Pt will perform sponge bath with SBA with no unsafe fatigue.     Strength Status: 5/5 BUEs  Long Term Goal: Pt to perform BUE strengthening with weights and/or body weight to increase ADL independence and safety    Endurance Status:   Short Term Goal:pt to perform 15 min OT treatment with 5 or greater rest breaks  Long Term Goal: pt to perform 30 min OT treat with 3 or less rest breaks                          Time Tracking:     OT Date of Treatment: 01/30/24  OT Start Time: 1014  OT Stop Time: 1055  OT Total Time (min): 41 min    Billable Minutes:Self Care/Home Management 10 min   Therapeutic Activity 10 min   Therapeutic Exercise 21 min      PARISA Thorpe/L, [facility]  OT/CUCA: OT          1/30/2024

## 2024-01-30 NOTE — ASSESSMENT & PLAN NOTE
01/18/24 PT and OT to evaluate and treat   Brown show that she lives alone. She is household ambulator with rollator.      01/22/24 walked 16 feet with therapist during last session, continue therapy for strengthening    01/24/24 walked 30 feet with therapist yesterday    01/26/24 walked 40 feet with therapist yesterday    01/29/24 continue therapy for strengthening      01/30/24 worked with OT yesterday

## 2024-01-30 NOTE — ASSESSMENT & PLAN NOTE
01/18/24 continue lopressor 50 mg po daily     And apresoline 25 mg po BID      While in the hospital, will manage blood pressure as follows; Continue home antihypertensive regimen    Will utilize p.r.n. blood pressure medication only if patient's blood pressure greater than 180/110 and she develops symptoms such as worsening chest pain or shortness of breath.      01/23/24 stable     01/30/24 stable

## 2024-01-31 LAB
GLUCOSE SERPL-MCNC: 161 MG/DL (ref 70–105)
GLUCOSE SERPL-MCNC: 267 MG/DL (ref 70–105)
GLUCOSE SERPL-MCNC: 315 MG/DL (ref 70–105)
GLUCOSE SERPL-MCNC: 73 MG/DL (ref 70–105)

## 2024-01-31 PROCEDURE — 27000944

## 2024-01-31 PROCEDURE — 97116 GAIT TRAINING THERAPY: CPT | Mod: CQ

## 2024-01-31 PROCEDURE — 97110 THERAPEUTIC EXERCISES: CPT

## 2024-01-31 PROCEDURE — 99900035 HC TECH TIME PER 15 MIN (STAT)

## 2024-01-31 PROCEDURE — 82962 GLUCOSE BLOOD TEST: CPT

## 2024-01-31 PROCEDURE — 63600175 PHARM REV CODE 636 W HCPCS: Performed by: FAMILY MEDICINE

## 2024-01-31 PROCEDURE — 11000004 HC SNF PRIVATE

## 2024-01-31 PROCEDURE — 63600175 PHARM REV CODE 636 W HCPCS: Performed by: NURSE PRACTITIONER

## 2024-01-31 PROCEDURE — 27000982 HC MATTRESS, MATRIX LAL RENTAL

## 2024-01-31 PROCEDURE — 97530 THERAPEUTIC ACTIVITIES: CPT | Mod: CQ

## 2024-01-31 PROCEDURE — 25000003 PHARM REV CODE 250: Performed by: NURSE PRACTITIONER

## 2024-01-31 PROCEDURE — 94761 N-INVAS EAR/PLS OXIMETRY MLT: CPT

## 2024-01-31 RX ADMIN — HYDRALAZINE HYDROCHLORIDE 25 MG: 25 TABLET ORAL at 08:01

## 2024-01-31 RX ADMIN — SENNOSIDES AND DOCUSATE SODIUM 1 TABLET: 8.6; 5 TABLET ORAL at 08:01

## 2024-01-31 RX ADMIN — THEOPHYLLINE ANHYDROUS 200 MG: 200 CAPSULE, EXTENDED RELEASE ORAL at 09:01

## 2024-01-31 RX ADMIN — INSULIN ASPART 4 UNITS: 100 INJECTION, SOLUTION INTRAVENOUS; SUBCUTANEOUS at 12:01

## 2024-01-31 RX ADMIN — LEVOTHYROXINE SODIUM 50 MCG: 50 TABLET ORAL at 06:01

## 2024-01-31 RX ADMIN — ENOXAPARIN SODIUM 30 MG: 30 INJECTION SUBCUTANEOUS at 05:01

## 2024-01-31 RX ADMIN — OXYCODONE HYDROCHLORIDE AND ACETAMINOPHEN 1000 MG: 500 TABLET ORAL at 08:01

## 2024-01-31 RX ADMIN — FLUTICASONE PROPIONATE 100 MCG: 50 SPRAY, METERED NASAL at 09:01

## 2024-01-31 RX ADMIN — FAMOTIDINE 20 MG: 20 TABLET ORAL at 09:01

## 2024-01-31 RX ADMIN — OXYCODONE HYDROCHLORIDE AND ACETAMINOPHEN 1000 MG: 500 TABLET ORAL at 09:01

## 2024-01-31 RX ADMIN — OMEGA-3 FATTY ACIDS CAP 1000 MG 1 CAPSULE: 1000 CAP at 09:01

## 2024-01-31 RX ADMIN — INSULIN ASPART 1 UNITS: 100 INJECTION, SOLUTION INTRAVENOUS; SUBCUTANEOUS at 08:01

## 2024-01-31 RX ADMIN — SENNOSIDES AND DOCUSATE SODIUM 1 TABLET: 8.6; 5 TABLET ORAL at 09:01

## 2024-01-31 RX ADMIN — INSULIN DETEMIR 10 UNITS: 100 INJECTION, SOLUTION SUBCUTANEOUS at 08:01

## 2024-01-31 RX ADMIN — HYDRALAZINE HYDROCHLORIDE 25 MG: 25 TABLET ORAL at 09:01

## 2024-01-31 RX ADMIN — Medication 400 MG: at 09:01

## 2024-01-31 RX ADMIN — METOPROLOL TARTRATE 50 MG: 50 TABLET, FILM COATED ORAL at 09:01

## 2024-01-31 RX ADMIN — THEOPHYLLINE ANHYDROUS 200 MG: 200 CAPSULE, EXTENDED RELEASE ORAL at 08:01

## 2024-01-31 NOTE — PT/OT/SLP PROGRESS
Physical Therapy Treatment    Patient Name:  Jackelin Owens   MRN:  [id number]    Recommendations:     Discharge Recommendations: Low Intensity Therapy  Discharge Equipment Recommendations: other (see comments) (May need a rolling walker on discharge; PT will continue to assess.)  Barriers to discharge: Decreased caregiver support    Assessment:     Jackelin Owens is a 89 y.o. female admitted with a medical diagnosis of Generalized weakness.  She presents with the following impairments/functional limitations: weakness, impaired endurance, impaired self care skills, impaired functional mobility, gait instability, impaired balance, decreased coordination, decreased lower extremity function, decreased safety awareness. Patient with greatly improved participation and overall presentation today with no reports of wanting to die. Patient participated well with physical therapy treatment. Patient will require 24/7 care on discharge to ensure safety and minimize fall risk.     Rehab Prognosis: Good; patient would benefit from acute skilled PT services to address these deficits and reach maximum level of function.    Recent Surgery: * No surgery found *      Plan:     During this hospitalization, patient to be seen 5 x/week to address the identified rehab impairments via gait training, therapeutic activities, therapeutic exercises, neuromuscular re-education and progress toward the following goals:    Plan of Care Expires:  02/08/24    Subjective     Chief Complaint: no complaints this morning  Patient/Family Comments/goals: Patient's goal is to return home with the assist of her family when functionally able.  Pain/Comfort:  0/10    Objective:     Communicated with Renata Lynn OT prior to session. Patient found up in chair in the rehab gym by way of OT upon Physical Therapist's arrival.     General Precautions: Standard, fall  Orthopedic Precautions: N/A  Braces: N/A  Respiratory Status: Room air     Functional  Mobility:  Transfers:     Sit to Stand:  contact guard assistance with rolling walker  Gait: x 50 with rolling walker with contact guard assist vs minimal assist; 3 standing rest breaks required to complete gait trial; slow eladio; short step lengths; mild dyspnea on exertion with verbal cues required to focusing on breathing while walking; verbal cues to keep rolling walker further ahead of her due to tendency to walk against front rail of rolling walker    Treatment & Education:    Ankle pumps: x 15 reps each  Heel slides: x 15 reps each  Hip abduction/adduction reclined in chair: x 15 reps each  Straight leg raise: x 15 reps each  Long arc quads: x 15 reps each  Seated marching: x 15 reps each    Patient left up in chair with call button in reach. Patient educated on how to call for her nurse using her call light to request assist when she is ready to return to bed.    GOALS:   Multidisciplinary Problems       Physical Therapy Goals          Problem: Physical Therapy    Goal Priority Disciplines Outcome Goal Variances Interventions   Physical Therapy Goal     PT, PT/OT Ongoing, Progressing     Description: Short-term Goals: 1.5 weeks  1. Patient will perform supine to/from sit with standby assist to improve independence and safety with bed mobility.  2. Patient will perform sit to/from stand with a rolling walker with contact guard assist to improve independence and safety with transfers.  3. Patient will ambulate 75 feet with a rolling walker with contact guard assist to improve independence and safety with gait.  4. Patient will tolerate 15 minutes of physical therapy intervention to improve endurance and activity tolerance.    Long-term goals: 3 weeks  1. Patient will perform supine to/from sit with modified independence to improve independence and safety with bed mobility.  2. Patient will perform sit to/from stand with a rolling walker with standby assist to improve independence and safety with  transfers.  3. Patient will ambulate 150 feet with a rolling walker with standby assist to improve independence and safety with gait.  4. Patient will tolerate 30 minutes of physical therapy intervention to improve endurance and activity tolerance.                       Time Tracking:     PT Received On: 01/30/24  PT Start Time: 1104     PT Stop Time: 1133  PT Total Time (min): 29 min     Billable Minutes: Gait Training 14 minutes and Therapeutic Exercise 15 minutes    Treatment Type: Treatment, 6th Visit  PT/PTA: PT     Number of PTA visits since last PT visit: 0     01/31/2024

## 2024-01-31 NOTE — PLAN OF CARE
----- Message from Erin Alvarez sent at 1/20/2022 12:42 PM CST -----   12:40     The patient saw Jimmy yesterday. She had another episode  last night. Her b/p dropped last night and she passed out. She twisted her ankle.She was given Valtrex for shingles and she had her 2nd dose of her vaccine Tuesday. She did not know if it could be from her vaccine or Valtrex. Please call.her pt's # [phone] GH      [facility] - Medical Surgical Unit  Discharge Assessment    Primary Care Provider: No, Primary Doctor     Discharge Assessment (most recent)       BRIEF DISCHARGE ASSESSMENT - 01/31/24 1129          Discharge Planning    Assessment Type Discharge Planning Brief Assessment (P)      Resource/Environmental Concerns none (P)      Support Systems Family members (P)      Discharge Plan A Home with family;Home Health (P)                    Spoke with Ms. Owens's daughter, Iesha Willoughby, and the plan is for Ms. Owens to go home with either Iesha or the other daughter, Clau for a few weeks [facility] and if she continues to show improvement, she will go to her home alone and [facility] with family support.

## 2024-01-31 NOTE — PT/OT/SLP PROGRESS
Physical Therapy Treatment    Patient Name:  Jackelin Owens   MRN:  [id number]    Recommendations:     Discharge Recommendations: Low Intensity Therapy  Discharge Equipment Recommendations: other (see comments) (May need a rolling walker on discharge; PT will continue to assess.)  Barriers to discharge: Decreased caregiver support    Assessment:     Jackelin Owens is a 89 y.o. female admitted with a medical diagnosis of Generalized weakness.  She presents with the following impairments/functional limitations: weakness, impaired endurance, impaired self care skills, impaired functional mobility, gait instability, impaired balance, decreased coordination, decreased lower extremity function, decreased safety awareness Patient with high spirits with therapy today. Patient with improvements noted in all aspects of therapy. Patient reports her readiness to return home and be with her family, but will require supervision with return home to insure safety.     Rehab Prognosis: Fair; patient would benefit from acute skilled PT services to address these deficits and reach maximum level of function.    Recent Surgery: * No surgery found *      Plan:     During this hospitalization, patient to be seen 5 x/week to address the identified rehab impairments via gait training, therapeutic activities, therapeutic exercises, neuromuscular re-education and progress toward the following goals:    Plan of Care Expires:  02/08/24    Subjective     Chief Complaint: feeling unwell   Patient/Family Comments/goals: return back home   Pain/Comfort:  Pain Rating 1: 0/10  Pain Rating Post-Intervention 1: 0/10      Objective:     Communicated with OT prior to session.  Patient found HOB elevated with peripheral IV upon PT entry to room.     General Precautions: Standard, fall  Orthopedic Precautions: N/A  Braces: N/A  Respiratory Status: Room air     Functional Mobility:  Bed Mobility:     Supine to Sit: modified independence  Transfers:     Sit to  Stand:  contact guard assistance with rolling walker  Bed to Chair: contact guard assistance with  rolling walker  using  Step Transfer  Toilet Transfer: contact guard assistance with  rolling walker  using  Step Transfer  Gait: Patient ambulated 30' x 2 with RW, CGA, and no LOB.      AM-PAC 6 CLICK MOBILITY  Turning over in bed (including adjusting bedclothes, sheets and blankets)?: 3  Sitting down on and standing up from a chair with arms (e.g., wheelchair, bedside commode, etc.): 3  Moving from lying on back to sitting on the side of the bed?: 3  Moving to and from a bed to a chair (including a wheelchair)?: 3  Need to walk in hospital room?: 3  Climbing 3-5 steps with a railing?: 2  Basic Mobility Total Score: 17       Treatment & Education:   Transfers and ambulation as listed above.   Sit to stands - 5 reps    Patient left up in chair with call button in reach and chair alarm on..    GOALS:   Multidisciplinary Problems       Physical Therapy Goals          Problem: Physical Therapy    Goal Priority Disciplines Outcome Goal Variances Interventions   Physical Therapy Goal     PT, PT/OT Ongoing, Progressing     Description: Short-term Goals: 1.5 weeks  1. Patient will perform supine to/from sit with standby assist to improve independence and safety with bed mobility.  2. Patient will perform sit to/from stand with a rolling walker with contact guard assist to improve independence and safety with transfers.  3. Patient will ambulate 75 feet with a rolling walker with contact guard assist to improve independence and safety with gait.  4. Patient will tolerate 15 minutes of physical therapy intervention to improve endurance and activity tolerance.    Long-term goals: 3 weeks  1. Patient will perform supine to/from sit with modified independence to improve independence and safety with bed mobility.  2. Patient will perform sit to/from stand with a rolling walker with standby assist to improve independence and safety  with transfers.  3. Patient will ambulate 150 feet with a rolling walker with standby assist to improve independence and safety with gait.  4. Patient will tolerate 30 minutes of physical therapy intervention to improve endurance and activity tolerance.                         Time Tracking:     PT Received On: 01/31/24  PT Start Time: 0907     PT Stop Time: 0933  PT Total Time (min): 26 min     Billable Minutes: Gait Training 15 and Therapeutic Activity 11    Treatment Type: Treatment  PT/PTA: PTA     Number of PTA visits since last PT visit: 1   GILBERTO Edmond   01/31/2024

## 2024-01-31 NOTE — PT/OT/SLP PROGRESS
Occupational Therapy   Treatment    Name: Jackelin Owens  MRN: [id number]  Admitting Diagnosis:  Generalized weakness       Recommendations:     Discharge Recommendations: Low Intensity Therapy  Discharge Equipment Recommendations:  other (see comments)  Barriers to discharge:       Assessment:     Jackelin Owens is a 89 y.o. female with a medical diagnosis of Generalized weakness.  She presents with weakness. Performance deficits affecting function are weakness, impaired endurance, impaired self care skills, impaired functional mobility, gait instability, impaired balance, decreased coordination, decreased lower extremity function, decreased safety awareness.     Rehab Prognosis:  Fair; patient would benefit from acute skilled OT services to address these deficits and reach maximum level of function.       Plan:     Patient to be seen 5 x/week to address the above listed problems via self-care/home management, therapeutic activities, therapeutic exercises  Plan of Care Expires: 02/18/24  Plan of Care Reviewed with: daughter    Subjective     Chief Complaint: Pt c/o fatigue but was agreeable to Tx  Patient/Family Comments/goals: return home  Pain/Comfort:       Objective:     Communicated with: Pt prior to session.  Patient found up in chair with   upon OT entry to room.    General Precautions: Standard, fall    Orthopedic Precautions:N/A  Braces: N/A  Respiratory Status: Room air     Occupational Performance:     Bed Mobility:    Patient completed Sit to Supine with supervision     Functional Mobility/Transfers:  Patient completed Bed <> Chair Transfer using Stand Pivot technique with contact guard assistance with no assistive device  Functional Mobility: Pt completed sit>stand and transfer    Activities of Daily Living:        [facility] 6 Click ADL:      Treatment & Education:  Pt completed BUE elbow flex, chest press, with 2#dowel and sh IR/ER with red Tband 2x10 ea ex with extended time to complete.    Patient left  HOB elevated with all lines intact and call button in reach    GOALS:   Multidisciplinary Problems       Occupational Therapy Goals          Problem: Occupational Therapy    Goal Priority Disciplines Outcome Interventions   Occupational Therapy Goal     OT, PT/OT Ongoing, Progressing    Description: Grooming Status:   Short Term Goal: Pt will perform grooming with Min A sitting EOB.   Long Term Goal: Pt will perform grooming/oral hygiene standing at sink with SBA.      LE dressing Status:   Short Term Goal: Pt will perform LE dressing with Min A.   Long Term Goal: Pt will perform LE dressing with SBA.    Toileting Status:   Short Term Goal: Pt will perform toilet hygiene on BSC with Min A.  Long Term Goal: Pt will perform toilet hygiene on toilet with no AE with SBA.    Commode Transfer:   Short Term Goal: Pt will perform BSC t/f with Min A.  Long Term Goal:  Pt will perform toilet t/f in bathroom with SBA.     Bathing Status:   Long Term Goal: Pt will perform sponge bath with SBA with no unsafe fatigue.     Strength Status: 5/5 BUEs  Long Term Goal: Pt to perform BUE strengthening with weights and/or body weight to increase ADL independence and safety    Endurance Status:   Short Term Goal:pt to perform 15 min OT treatment with 5 or greater rest breaks  Long Term Goal: pt to perform 30 min OT treat with 3 or less rest breaks                          Time Tracking:     OT Date of Treatment: 01/31/24  OT Start Time: 1005  OT Stop Time: 1020  OT Total Time (min): 15 min    Billable Minutes:Therapeutic Exercise 15               1/31/2024

## 2024-02-01 LAB
ANION GAP SERPL CALCULATED.3IONS-SCNC: 9 MMOL/L (ref 7–16)
ATYPICAL LYMPHOCYTES: ABNORMAL
BASOPHILS # BLD AUTO: 0.02 K/UL (ref 0–0.2)
BASOPHILS NFR BLD AUTO: 0.3 % (ref 0–1)
BUN SERPL-MCNC: 35 MG/DL (ref 7–18)
BUN/CREAT SERPL: 34 (ref 6–20)
CALCIUM SERPL-MCNC: 9.9 MG/DL (ref 8.5–10.1)
CHLORIDE SERPL-SCNC: 105 MMOL/L (ref 98–107)
CO2 SERPL-SCNC: 27 MMOL/L (ref 21–32)
CREAT SERPL-MCNC: 1.02 MG/DL (ref 0.55–1.02)
DIFFERENTIAL METHOD BLD: ABNORMAL
EGFR (NO RACE VARIABLE) (RUSH/TITUS): 53 ML/MIN/1.73M2
EOSINOPHIL # BLD AUTO: 0.3 K/UL (ref 0–0.5)
EOSINOPHIL NFR BLD AUTO: 4.5 % (ref 1–4)
EOSINOPHIL NFR BLD MANUAL: 4 % (ref 1–4)
ERYTHROCYTE [DISTWIDTH] IN BLOOD BY AUTOMATED COUNT: 14.4 % (ref 11.5–14.5)
GLUCOSE SERPL-MCNC: 115 MG/DL (ref 74–106)
GLUCOSE SERPL-MCNC: 116 MG/DL (ref 70–105)
GLUCOSE SERPL-MCNC: 174 MG/DL (ref 70–105)
GLUCOSE SERPL-MCNC: 216 MG/DL (ref 70–105)
GLUCOSE SERPL-MCNC: 315 MG/DL (ref 70–105)
HCT VFR BLD AUTO: 36.1 % (ref 38–47)
HGB BLD-MCNC: 11.7 G/DL (ref 12–16)
LYMPHOCYTES # BLD AUTO: 1.88 K/UL (ref 1–4.8)
LYMPHOCYTES NFR BLD AUTO: 28.2 % (ref 27–41)
LYMPHOCYTES NFR BLD MANUAL: 30 % (ref 27–41)
MCH RBC QN AUTO: 28.2 PG (ref 27–31)
MCHC RBC AUTO-ENTMCNC: 32.4 G/DL (ref 32–36)
MCV RBC AUTO: 87 FL (ref 80–96)
MONOCYTES # BLD AUTO: 1.47 K/UL (ref 0–0.8)
MONOCYTES NFR BLD AUTO: 22 % (ref 2–6)
MONOCYTES NFR BLD MANUAL: 19 % (ref 2–6)
MPC BLD CALC-MCNC: 12.1 FL (ref 9.4–12.4)
NEUTROPHILS # BLD AUTO: 3 K/UL (ref 1.8–7.7)
NEUTROPHILS NFR BLD AUTO: 45 % (ref 53–65)
NEUTS BAND NFR BLD MANUAL: 9 % (ref 1–5)
NEUTS SEG NFR BLD MANUAL: 38 % (ref 50–62)
NRBC BLD MANUAL-RTO: ABNORMAL %
PLATELET # BLD AUTO: 162 K/UL (ref 150–400)
PLATELET MORPHOLOGY: ABNORMAL
POTASSIUM SERPL-SCNC: 3.9 MMOL/L (ref 3.5–5.1)
RBC # BLD AUTO: 4.15 M/UL (ref 4.2–5.4)
RBC MORPH BLD: NORMAL
REACTIVE LYMPHOCYTES: ABNORMAL
SODIUM SERPL-SCNC: 137 MMOL/L (ref 136–145)
WBC # BLD AUTO: 6.67 K/UL (ref 4.5–11)

## 2024-02-01 PROCEDURE — 63600175 PHARM REV CODE 636 W HCPCS: Performed by: NURSE PRACTITIONER

## 2024-02-01 PROCEDURE — 94761 N-INVAS EAR/PLS OXIMETRY MLT: CPT

## 2024-02-01 PROCEDURE — 63600175 PHARM REV CODE 636 W HCPCS: Performed by: FAMILY MEDICINE

## 2024-02-01 PROCEDURE — 80048 BASIC METABOLIC PNL TOTAL CA: CPT | Performed by: NURSE PRACTITIONER

## 2024-02-01 PROCEDURE — 85025 COMPLETE CBC W/AUTO DIFF WBC: CPT | Performed by: NURSE PRACTITIONER

## 2024-02-01 PROCEDURE — 97110 THERAPEUTIC EXERCISES: CPT

## 2024-02-01 PROCEDURE — 11000004 HC SNF PRIVATE

## 2024-02-01 PROCEDURE — 97116 GAIT TRAINING THERAPY: CPT | Mod: CQ

## 2024-02-01 PROCEDURE — 27000221 HC OXYGEN, UP TO 24 HOURS

## 2024-02-01 PROCEDURE — 97530 THERAPEUTIC ACTIVITIES: CPT

## 2024-02-01 PROCEDURE — 99307 SBSQ NF CARE SF MDM 10: CPT | Mod: ,,, | Performed by: NURSE PRACTITIONER

## 2024-02-01 PROCEDURE — 82962 GLUCOSE BLOOD TEST: CPT

## 2024-02-01 PROCEDURE — 27000944

## 2024-02-01 PROCEDURE — 27000982 HC MATTRESS, MATRIX LAL RENTAL

## 2024-02-01 PROCEDURE — 99900035 HC TECH TIME PER 15 MIN (STAT)

## 2024-02-01 PROCEDURE — 25000003 PHARM REV CODE 250: Performed by: NURSE PRACTITIONER

## 2024-02-01 RX ADMIN — FLUTICASONE PROPIONATE 100 MCG: 50 SPRAY, METERED NASAL at 08:02

## 2024-02-01 RX ADMIN — INSULIN ASPART 2 UNITS: 100 INJECTION, SOLUTION INTRAVENOUS; SUBCUTANEOUS at 12:02

## 2024-02-01 RX ADMIN — SENNOSIDES AND DOCUSATE SODIUM 1 TABLET: 8.6; 5 TABLET ORAL at 08:02

## 2024-02-01 RX ADMIN — OXYCODONE HYDROCHLORIDE AND ACETAMINOPHEN 1000 MG: 500 TABLET ORAL at 08:02

## 2024-02-01 RX ADMIN — METOPROLOL TARTRATE 50 MG: 50 TABLET, FILM COATED ORAL at 08:02

## 2024-02-01 RX ADMIN — INSULIN DETEMIR 10 UNITS: 100 INJECTION, SOLUTION SUBCUTANEOUS at 08:02

## 2024-02-01 RX ADMIN — LEVOTHYROXINE SODIUM 50 MCG: 50 TABLET ORAL at 06:02

## 2024-02-01 RX ADMIN — INSULIN ASPART 2 UNITS: 100 INJECTION, SOLUTION INTRAVENOUS; SUBCUTANEOUS at 08:02

## 2024-02-01 RX ADMIN — ENOXAPARIN SODIUM 30 MG: 30 INJECTION SUBCUTANEOUS at 04:02

## 2024-02-01 RX ADMIN — Medication 400 MG: at 08:02

## 2024-02-01 RX ADMIN — HYDRALAZINE HYDROCHLORIDE 25 MG: 25 TABLET ORAL at 08:02

## 2024-02-01 RX ADMIN — THEOPHYLLINE ANHYDROUS 200 MG: 200 CAPSULE, EXTENDED RELEASE ORAL at 08:02

## 2024-02-01 RX ADMIN — OMEGA-3 FATTY ACIDS CAP 1000 MG 1 CAPSULE: 1000 CAP at 08:02

## 2024-02-01 RX ADMIN — FAMOTIDINE 20 MG: 20 TABLET ORAL at 08:02

## 2024-02-01 NOTE — SUBJECTIVE & OBJECTIVE
Interval History: weakness    Review of Systems   Constitutional:  Negative for appetite change, fatigue and fever.        Appetite good   HENT:  Negative for sore throat and trouble swallowing.    Eyes:  Negative for redness.   Respiratory:  Negative for cough, choking, chest tightness and shortness of breath.    Gastrointestinal:  Negative for abdominal distention, abdominal pain, constipation, diarrhea, nausea and vomiting.   Genitourinary:  Negative for dysuria.   Musculoskeletal:  Negative for arthralgias, back pain, neck pain and neck stiffness.   Skin:  Negative for pallor and wound.   Neurological:  Positive for weakness. Negative for light-headedness and headaches.     Objective:     Vital Signs (Most Recent):  Temp: 97.7 °F (36.5 °C) (02/01/24 0706)  Pulse: 73 (02/01/24 0706)  Resp: 18 (02/01/24 0706)  BP: (!) 145/70 (02/01/24 0706)  SpO2: 98 % (02/01/24 0706) Vital Signs (24h Range):  Temp:  [97.7 °F (36.5 °C)-98 °F (36.7 °C)] 97.7 °F (36.5 °C)  Pulse:  [72-74] 73  Resp:  [18-20] 18  SpO2:  [96 %-98 %] 98 %  BP: (143-145)/(67-70) 145/70     Weight: 49.1 kg (108 lb 3.2 oz)  Body mass index is 19.79 kg/m².    Intake/Output Summary (Last 24 hours) at 2/1/2024 0891  Last data filed at 2/1/2024 0628  Gross per 24 hour   Intake 1380 ml   Output --   Net 1380 ml         Physical Exam  HENT:      Head: Normocephalic.      Mouth/Throat:      Mouth: Mucous membranes are moist.   Cardiovascular:      Rate and Rhythm: Normal rate. Rhythm irregular.      Pulses: Normal pulses.      Heart sounds: No murmur heard.     No friction rub. No gallop.   Pulmonary:      Effort: Pulmonary effort is normal. No respiratory distress.      Breath sounds: Normal breath sounds. No wheezing or rhonchi.      Comments: Sat on room air is 95%  Abdominal:      General: Bowel sounds are normal. There is no distension.      Palpations: Abdomen is soft. There is no mass.      Tenderness: There is no abdominal tenderness. There is no guarding  or rebound.      Hernia: No hernia is present.   Musculoskeletal:         General: Normal range of motion.      Cervical back: Normal range of motion.   Skin:     General: Skin is warm and dry.      Coloration: Skin is not pale.      Findings: No erythema, lesion or rash.   Neurological:      Mental Status: She is alert and oriented to person, place, and time.      Motor: Weakness present.             Significant Labs: All pertinent labs within the past 24 hours have been reviewed.  Recent Lab Results  (Last 5 results in the past 24 hours)        02/01/24  0600   02/01/24  0522   01/31/24  2038   01/31/24  1642   01/31/24  1123        Anion Gap   9             Atypical Lymphocytes   Few  Comment: This is an appended report.  These results have been appended to a previously final verified report.             Bands   9  Comment: This is a corrected result. Previous result was 7 % on 2/1/2024 at 0637 CST.  [C]             Baso #   0.02             Basophil %   0.3             BUN   35             BUN/CREAT RATIO   34             Calcium   9.9             Chloride   105             CO2   27             Creatinine   1.02             Differential Method   Manual             eGFR   53             Eos #   0.30             Eosinophil %   4.5                4  Comment: This is a corrected result. Previous result was 5 % on 2/1/2024 at 0637 CST.  [C]             Glucose   115             Hematocrit   36.1             Hemoglobin   11.7             Lymph #   1.88             Lymph %   28.2                30  Comment: This is a corrected result. Previous result was 40 % on 2/1/2024 at 0637 CST.  [C]             MCH   28.2             MCHC   32.4             MCV   87.0             Mono #   1.47             Mono %   22.0                19  Comment: This is a corrected result. Previous result was 6 % on 2/1/2024 at 0637 CST.  [C]             MPV   12.1             Neutrophils, Abs   3.00             Neutrophils Relative   45.0              nRBC               PLATELET MORPHOLOGY   Large & Giant Platelets  Comment: This is a corrected result. Previous result was Normal on 2/1/2024 at 0637 CST.  [C]             Platelet Count   162             POC Glucose 116     267   161   315       Potassium   3.9             RBC   4.15             RBC Morphology   Normal             RDW   14.4             Reactive Lymphocytes   Few  Comment: This is an appended report.  These results have been appended to a previously final verified report.             Segmented Neutrophils, Man %   38  Comment: This is a corrected result. Previous result was 42 % on 2/1/2024 at 0637 CST.  [C]             Sodium   137             WBC   6.67                                     [C] - Corrected Result               Significant Imaging: I have reviewed all pertinent imaging results/findings within the past 24 hours.

## 2024-02-01 NOTE — PT/OT/SLP PROGRESS
Occupational Therapy   Treatment    Name: Jackelin Owens  MRN: [id number]  Admitting Diagnosis:  Generalized weakness       Recommendations:     Discharge Recommendations: Low Intensity Therapy  Discharge Equipment Recommendations:  other (see comments)  Barriers to discharge:  None    Assessment:     Jackelin Owens is a 89 y.o. female with a medical diagnosis of Generalized weakness.  She presents with weakness and decline with ADLs. Performance deficits affecting function are weakness, impaired endurance, impaired self care skills, impaired functional mobility, gait instability, impaired balance, decreased coordination, decreased lower extremity function, decreased safety awareness.     Rehab Prognosis:  Good; patient would benefit from acute skilled OT services to address these deficits and reach maximum level of function.       Plan:     Patient to be seen 5 x/week to address the above listed problems via self-care/home management, therapeutic activities, therapeutic exercises  Plan of Care Expires: 02/18/24  Plan of Care Reviewed with: daughter    Subjective     Chief Complaint: Pain and weakness  Patient/Family Comments/goals:  to return to prior level of function  Pain/Comfort:       Objective:     Communicated with: Nurse prior to session.  Patient found supine with peripheral IV upon OT entry to room.    General Precautions: Standard, fall    Orthopedic Precautions:N/A  Braces: N/A  Respiratory Status: Room air     Occupational Performance:     Bed Mobility:    Patient completed Rolling/Turning to Left with  modified independence  Patient completed Rolling/Turning to Right with modified independence  Patient completed Scooting/Bridging with modified independence  Patient completed Supine to Sit with modified independence     Functional Mobility/Transfers:  Patient completed Sit <> Stand Transfer with stand by assistance  with  rolling walker   Patient completed Bed <> Chair Transfer using Step Transfer  technique with stand by assistance with rolling walker  Functional Mobility: Patient transferred within room with no difficulty    Activities of Daily Living:  Upper Body Dressing: stand by assistance to pennie robe  Lower Body Dressing: stand by assistance to pennie socks      AMPAC 6 Click ADL:      Treatment & Education:  Pt performed B UE strengthening exercises to include:   Shoulder flexion   Chest press    Elbow flexion   Elbow extension   Pectoral stretches   Bilateral rowing  All exercises performed 3x10 reps using red theraband and 2# weight.     Patient left up in chair with all lines intact, call button in reach, and chair alarm on    GOALS:   Multidisciplinary Problems       Occupational Therapy Goals          Problem: Occupational Therapy    Goal Priority Disciplines Outcome Interventions   Occupational Therapy Goal     OT, PT/OT Ongoing, Progressing    Description: Grooming Status:   Short Term Goal: Pt will perform grooming with Min A sitting EOB.   Long Term Goal: Pt will perform grooming/oral hygiene standing at sink with SBA.      LE dressing Status:   Short Term Goal: Pt will perform LE dressing with Min A.   Long Term Goal: Pt will perform LE dressing with SBA.    Toileting Status:   Short Term Goal: Pt will perform toilet hygiene on BSC with Min A.  Long Term Goal: Pt will perform toilet hygiene on toilet with no AE with SBA.    Commode Transfer:   Short Term Goal: Pt will perform BSC t/f with Min A.  Long Term Goal:  Pt will perform toilet t/f in bathroom with SBA.     Bathing Status:   Long Term Goal: Pt will perform sponge bath with SBA with no unsafe fatigue.     Strength Status: 5/5 BUEs  Long Term Goal: Pt to perform BUE strengthening with weights and/or body weight to increase ADL independence and safety    Endurance Status:   Short Term Goal:pt to perform 15 min OT treatment with 5 or greater rest breaks  Long Term Goal: pt to perform 30 min OT treat with 3 or less rest breaks                           Time Tracking:     OT Date of Treatment: 02/01/24  OT Start Time: 1103  OT Stop Time: 1144  OT Total Time (min): 41 min    Billable Minutes:Therapeutic Activity 31 min  Therapeutic Exercise 10 min      PARISA Thorpe/L, CSRS  OT/CUCA: OT          2/2/2024

## 2024-02-01 NOTE — PT/OT/SLP PROGRESS
Physical Therapy Treatment    Patient Name:  Jackelin Owens   MRN:  [id number]    Recommendations:     Discharge Recommendations: Low Intensity Therapy  Discharge Equipment Recommendations: other (see comments) (May need a rolling walker on discharge; PT will continue to assess.)  Barriers to discharge: Decreased caregiver support    Assessment:     Jackelin Owens is a 89 y.o. female admitted with a medical diagnosis of Generalized weakness.  She presents with the following impairments/functional limitations: weakness, impaired endurance, impaired self care skills, impaired functional mobility, gait instability, impaired balance, decreased coordination, decreased lower extremity function, decreased safety awareness Patient with high spirits with therapy today. Patient with improvements noted in ambulation distance. Patient reports her readiness to return home and be with her family.    Rehab Prognosis: Fair; patient would benefit from acute skilled PT services to address these deficits and reach maximum level of function.    Recent Surgery: * No surgery found *      Plan:     During this hospitalization, patient to be seen 5 x/week to address the identified rehab impairments via gait training, therapeutic activities, therapeutic exercises, neuromuscular re-education and progress toward the following goals:    Plan of Care Expires:  02/08/24    Subjective     Chief Complaint: feeling unwell   Patient/Family Comments/goals: return back home   Pain/Comfort:  Pain Rating 1: 0/10  Pain Rating Post-Intervention 1: 0/10      Objective:     Communicated with OT prior to session.  Patient found HOB elevated with peripheral IV upon PT entry to room.     General Precautions: Standard, fall  Orthopedic Precautions: N/A  Braces: N/A  Respiratory Status: Room air     Functional Mobility:  Bed Mobility:     Supine to Sit: modified independence  Transfers:     Sit to Stand:  stand by assistance with rolling walker  Bed to Chair:  contact guard assistance with  rolling walker  using  Step Transfer  Gait: Patient ambulated 210' with RW, CGA, and no LOB.      AM-PAC 6 CLICK MOBILITY  Turning over in bed (including adjusting bedclothes, sheets and blankets)?: 4  Sitting down on and standing up from a chair with arms (e.g., wheelchair, bedside commode, etc.): 4  Moving from lying on back to sitting on the side of the bed?: 4  Moving to and from a bed to a chair (including a wheelchair)?: 4  Need to walk in hospital room?: 3  Climbing 3-5 steps with a railing?: 2  Basic Mobility Total Score: 21       Treatment & Education:   Transfers and ambulation as listed above.     Patient left up in chair with  OT present..    GOALS:   Multidisciplinary Problems       Physical Therapy Goals          Problem: Physical Therapy    Goal Priority Disciplines Outcome Goal Variances Interventions   Physical Therapy Goal     PT, PT/OT Ongoing, Progressing     Description: Short-term Goals: 1.5 weeks  1. Patient will perform supine to/from sit with standby assist to improve independence and safety with bed mobility.  2. Patient will perform sit to/from stand with a rolling walker with contact guard assist to improve independence and safety with transfers.  3. Patient will ambulate 75 feet with a rolling walker with contact guard assist to improve independence and safety with gait.  4. Patient will tolerate 15 minutes of physical therapy intervention to improve endurance and activity tolerance.    Long-term goals: 3 weeks  1. Patient will perform supine to/from sit with modified independence to improve independence and safety with bed mobility.  2. Patient will perform sit to/from stand with a rolling walker with standby assist to improve independence and safety with transfers.  3. Patient will ambulate 150 feet with a rolling walker with standby assist to improve independence and safety with gait.  4. Patient will tolerate 30 minutes of physical therapy intervention  to improve endurance and activity tolerance.                         Time Tracking:     PT Received On: 02/01/24  PT Start Time: 1036     PT Stop Time: 1104  PT Total Time (min): 28 min     Billable Minutes: Gait Training 25    Treatment Type: Treatment  PT/PTA: PTA     Number of PTA visits since last PT visit: 2   GILBERTO Edmond   02/01/2024

## 2024-02-01 NOTE — ASSESSMENT & PLAN NOTE
01/18/24 diabetic diet   Ensure supplement TID and prn       01/29/24 appetite improved, continue to supplement with boost       01/30/24 appetite improved

## 2024-02-01 NOTE — PROGRESS NOTES
[facility] - Medical Surgical Unit  Hospital Medicine  Progress Note    Patient Name: Jackelin Owens  MRN: [id number]  Patient Class: IP- Swing   Admission Date: 1/18/2024  Length of Stay: 14 days  Attending Physician: Home Alexis IV, DO  Primary Care Provider: Maria A, Primary Doctor        Subjective:     Principal Problem:Generalized weakness        HPI:  Jackelin Owens is an 89 year old female with pmh of hypertension, type 2 diabetes mellitus, hypothyroidism and interstitial lung disease.Records show that she was diagnosed with covid and flu approx 2 1/2 weeks ago. She was not prescribed tamiflu or paxlovid but was given an antibiotic. She presented to ER at [facility] with increasing SOB on 01/14/2024. Admitted and treated for covid 19 pneumonia. She tested positive for covid while in [facility] ER. She was treated with Levaquin and remdesevir, supplemental o2 . She has been working with therapy on strengthening but continues to have weakness. She has been accepted to [facility] for swing bed placement       01/18/24 VS on arrival are T98.1-87-/80 sat 95% room air. She is awake, alert, oriented. Accompanied by family members. Complains of occasional productive cough, weakness and poor appetite. Discussed code status and she chose DNI.    Overview/Hospital Course:  01/22/24 Awake and resting in bed. States she does not want to have anymore labs drawn, states she was stuck multiple times this morning and she cannot handle it. Complains of pain left flank radiating to back. States this started yesterday. States pain is at level 10. States area is itchy and painful. Noted small cluster rash left mammary fold- no blisters yet. Will start gabapentin and valcyclovir. Chest is clear. Sat on room air is 95%.   1200 reported to me that Mrs. Owens was complaining of chest pain and that she was tachycardic at rate of 127. Went to room to see her. She complained of having chest pain. States chest  feels heavy. Asked her when this pain began and she states it has been ongoing. Asked her if she had this pain yesterday, she replied she did. Asked her if she had it last week, she again replied that she did- that it has been ongoing. She also complains of having back pain as well -left upper back. She complains of needing to have BM but states she cannot. Complains that she has to urinate. Took her to bathroom , she urinated and had small BM. EKG and troponin ordered. EKG shows sinus tachycardia. Troponin normal at 9.6.   1235 Dr. Alexis here to assess pt and heard expiratory wheezing. Chest xray pending. Duonebs ordered prn. Solumedrol 60 mg IV every 8 hours ordered. Sat on room air is 97%.    1250 Mrs. Owens states she is feeling better. Sat is 97%.    1300 Went back to check on Mrs. Owens and she states she cannot breath. Sat is 97%. Chest xray pending. BNP pending .Telemetry shows sinus tachycardia with rate from 120s to 130s.  States she just wants to die. To just let her die.     1335 Sat continues to be 97% on room air. Heart rate 130s- will give metoprolol po and monitor.     1345 Daughter Clau Nair is in room and Mrs. Campos is much more calm. Heart rate down to 114. Continue to monitor on telemetry.      01/23/24 Awake and resting in bed. Complains of being hungry this morning. Blood glucose increased to over 300- increased levemir to 10 units nightly. Will decrease steroids. No complaints of SOB or chest pain this morning. Does report pain to left flank radiating to back. Continue valcyclovir.      01/23/24 nurse reports poct glucose over 500 - will have lab to verify- increase to moderate SSI. Levemir increased to 10 units nightly. Methylprednisone dcd      01/24/24 Awake and complaining of frontal headache. Tylenol given for relief. Blood glucose this morning is 115.continue levemir and mod SSI.     01/26/24 Awake and sitting up in bed . States she is feeling better this morning. Walked 40 feet  "with therapist yesterday. Complains of dry skin. Will give moisturizing lotion to use prn.      01/29/24 Awake and smiling. Mood improved. States she had really good day yesterday. Appetite has improved. Continue to work with therapy on strengthening      01/30/24 Awake and sitting on side of the bed. Complained od felling "woozy headed and itching." No rash noted but scratch marks seen to right posterior flank. VS stable. Applie moisturizing lotion and she states skin felt much better. Review of meds shows that she had lorazepam last night which could be causing her symptoms of felling " woozy."  Will add atarax for itcing prn.      02/01/24 Awake and resting in bed. Complains of having occasional productive cough. Reports that she worked with therapy yesterday. Complained about sitting up in chair too long. Encouraged her to continue to work with therapy - she walked 30 feet twice yesterday. Encouraged her to sit up as therapy requests because that will strengthen her as well. States she will do so.    Interval History: weakness    Review of Systems   Constitutional:  Negative for appetite change, fatigue and fever.        Appetite good   HENT:  Negative for sore throat and trouble swallowing.    Eyes:  Negative for redness.   Respiratory:  Negative for cough, choking, chest tightness and shortness of breath.    Gastrointestinal:  Negative for abdominal distention, abdominal pain, constipation, diarrhea, nausea and vomiting.   Genitourinary:  Negative for dysuria.   Musculoskeletal:  Negative for arthralgias, back pain, neck pain and neck stiffness.   Skin:  Negative for pallor and wound.   Neurological:  Positive for weakness. Negative for light-headedness and headaches.     Objective:     Vital Signs (Most Recent):  Temp: 97.7 °F (36.5 °C) (02/01/24 0706)  Pulse: 73 (02/01/24 0706)  Resp: 18 (02/01/24 0706)  BP: (!) 145/70 (02/01/24 0706)  SpO2: 98 % (02/01/24 0706) Vital Signs (24h Range):  Temp:  [97.7 °F " (36.5 °C)-98 °F (36.7 °C)] 97.7 °F (36.5 °C)  Pulse:  [72-74] 73  Resp:  [18-20] 18  SpO2:  [96 %-98 %] 98 %  BP: (143-145)/(67-70) 145/70     Weight: 49.1 kg (108 lb 3.2 oz)  Body mass index is 19.79 kg/m².    Intake/Output Summary (Last 24 hours) at 2/1/2024 0827  Last data filed at 2/1/2024 0628  Gross per 24 hour   Intake 1380 ml   Output --   Net 1380 ml         Physical Exam  HENT:      Head: Normocephalic.      Mouth/Throat:      Mouth: Mucous membranes are moist.   Cardiovascular:      Rate and Rhythm: Normal rate. Rhythm irregular.      Pulses: Normal pulses.      Heart sounds: No murmur heard.     No friction rub. No gallop.   Pulmonary:      Effort: Pulmonary effort is normal. No respiratory distress.      Breath sounds: Normal breath sounds. No wheezing or rhonchi.      Comments: Sat on room air is 95%  Abdominal:      General: Bowel sounds are normal. There is no distension.      Palpations: Abdomen is soft. There is no mass.      Tenderness: There is no abdominal tenderness. There is no guarding or rebound.      Hernia: No hernia is present.   Musculoskeletal:         General: Normal range of motion.      Cervical back: Normal range of motion.   Skin:     General: Skin is warm and dry.      Coloration: Skin is not pale.      Findings: No erythema, lesion or rash.   Neurological:      Mental Status: She is alert and oriented to person, place, and time.      Motor: Weakness present.             Significant Labs: All pertinent labs within the past 24 hours have been reviewed.  Recent Lab Results  (Last 5 results in the past 24 hours)        02/01/24  0600   02/01/24  0522   01/31/24  2038   01/31/24  1642   01/31/24  1123        Anion Gap   9             Atypical Lymphocytes   Few  Comment: This is an appended report.  These results have been appended to a previously final verified report.             Bands   9  Comment: This is a corrected result. Previous result was 7 % on 2/1/2024 at 0637 CST.  [C]              Baso #   0.02             Basophil %   0.3             BUN   35             BUN/CREAT RATIO   34             Calcium   9.9             Chloride   105             CO2   27             Creatinine   1.02             Differential Method   Manual             eGFR   53             Eos #   0.30             Eosinophil %   4.5                4  Comment: This is a corrected result. Previous result was 5 % on 2/1/2024 at 0637 CST.  [C]             Glucose   115             Hematocrit   36.1             Hemoglobin   11.7             Lymph #   1.88             Lymph %   28.2                30  Comment: This is a corrected result. Previous result was 40 % on 2/1/2024 at 0637 CST.  [C]             MCH   28.2             MCHC   32.4             MCV   87.0             Mono #   1.47             Mono %   22.0                19  Comment: This is a corrected result. Previous result was 6 % on 2/1/2024 at 0637 CST.  [C]             MPV   12.1             Neutrophils, Abs   3.00             Neutrophils Relative   45.0             nRBC               PLATELET MORPHOLOGY   Large & Giant Platelets  Comment: This is a corrected result. Previous result was Normal on 2/1/2024 at 0637 CST.  [C]             Platelet Count   162             POC Glucose 116     267   161   315       Potassium   3.9             RBC   4.15             RBC Morphology   Normal             RDW   14.4             Reactive Lymphocytes   Few  Comment: This is an appended report.  These results have been appended to a previously final verified report.             Segmented Neutrophils, Man %   38  Comment: This is a corrected result. Previous result was 42 % on 2/1/2024 at 0637 CST.  [C]             Sodium   137             WBC   6.67                                     [C] - Corrected Result               Significant Imaging: I have reviewed all pertinent imaging results/findings within the past 24 hours.    Assessment/Plan:      * Generalized weakness  01/18/24 PT  and OT to evaluate and treat   Reocrds show that she lives alone. She is household ambulator with rollator.      01/22/24 walked 16 feet with therapist during last session, continue therapy for strengthening    01/24/24 walked 30 feet with therapist yesterday    01/26/24 walked 40 feet with therapist yesterday    01/29/24 continue therapy for strengthening      01/30/24 worked with OT yesterday    02/01/24 walked 30 feet twice yesterday    Type 2 diabetes mellitus  Home med is glimepiride 4 mg po bid   Last A1c reviewed- ordered  Current correctional scale  Low  Maintain anti-hyperglycemic dose as follows-   Antihyperglycemics (From admission, onward)      Start     Stop Route Frequency Ordered    01/26/24 0902  insulin aspart U-100 injection 0-5 Units         -- SubQ Before meals & nightly PRN 01/26/24 0802    01/23/24 2100  insulin detemir U-100 injection 10 Units         -- SubQ Nightly 01/23/24 0727          Hold Oral hypoglycemics while patient is in the hospital.    Accuchecks ac and hs with low dose SSI coverage       01/22/24 add 5 units levemir at hs     01/23/24 increase levemir to 10 units   Increased SSI to moderate  Dcd steroids    01/24/24 Blood glucose 115 this morning      01/26/24 continue levemir and SSI       01/29/24 stable    02/01/24 stable    Hypertension  01/18/24 continue lopressor 50 mg po daily     And apresoline 25 mg po BID      While in the hospital, will manage blood pressure as follows; Continue home antihypertensive regimen    Will utilize p.r.n. blood pressure medication only if patient's blood pressure greater than 180/110 and she develops symptoms such as worsening chest pain or shortness of breath.      01/23/24 stable     01/30/24 stable    Interstitial lung disease  01/18/24 continue theophylline 200 mg po BID       01/24/23 decrease steroids, duonebs prn wheezing    01/26/24 sat on room air is 96%    Hypothyroidism  01/18/24 continue levothyroxine 50 mcg daily      01/24/23  continue levothyroxine      01/30/24 continue levothyroxine     Poor appetite    01/18/24 diabetic diet   Ensure supplement TID and prn       01/29/24 appetite improved, continue to supplement with boost       01/30/24 appetite improved    GERD (gastroesophageal reflux disease)  01/18/24 continue famotidine        Herpes zoster without complication    01/22/24 start valacyclovir 1000 mg TID x 7 days  Start gabapentin      01/23/24 continue valacyclovir 1000 mg daily      VTE Risk Mitigation (From admission, onward)           Ordered     enoxaparin injection 30 mg  Every 24 hours         01/19/24 0706     IP VTE LOW RISK PATIENT  Once         01/18/24 1434     Place CARLOS hose  Until discontinued         01/18/24 1434                    Discharge Planning   CHELSEA: 2/8/2024     Code Status: Partial Code   Is the patient medically ready for discharge?:     Reason for patient still in hospital (select all that apply): Treatment  Discharge Plan A: Home with family, Home Health                  MARCELINA Mir  Department of Hospital Medicine   [facility] - Medical Surgical Unit

## 2024-02-01 NOTE — ASSESSMENT & PLAN NOTE
Home med is glimepiride 4 mg po bid   Last A1c reviewed- ordered  Current correctional scale  Low  Maintain anti-hyperglycemic dose as follows-   Antihyperglycemics (From admission, onward)      Start     Stop Route Frequency Ordered    01/26/24 0902  insulin aspart U-100 injection 0-5 Units         -- SubQ Before meals & nightly PRN 01/26/24 0802    01/23/24 2100  insulin detemir U-100 injection 10 Units         -- SubQ Nightly 01/23/24 0727          Hold Oral hypoglycemics while patient is in the hospital.    Accuchecks ac and hs with low dose SSI coverage       01/22/24 add 5 units levemir at hs     01/23/24 increase levemir to 10 units   Increased SSI to moderate  Dcd steroids    01/24/24 Blood glucose 115 this morning      01/26/24 continue levemir and SSI       01/29/24 stable    02/01/24 stable

## 2024-02-01 NOTE — PLAN OF CARE
Problem: Adult Inpatient Plan of Care  Goal: Patient-Specific Goal (Individualized)  Outcome: Ongoing, Progressing     Problem: Diabetes Comorbidity  Goal: Blood Glucose Level Within Targeted Range  Outcome: Ongoing, Progressing  Intervention: Monitor and Manage Glycemia  Flowsheets (Taken 2/1/2024 0541)  Glycemic Management:   blood glucose monitored   supplemental insulin given     Problem: Fall Injury Risk  Goal: Absence of Fall and Fall-Related Injury  Intervention: Identify and Manage Contributors  Flowsheets (Taken 2/1/2024 0541)  Self-Care Promotion: BADL personal routines maintained  Medication Review/Management: medications reviewed

## 2024-02-01 NOTE — PLAN OF CARE
Problem: Adult Inpatient Plan of Care  Goal: Plan of Care Review  Outcome: Ongoing, Progressing  Goal: Absence of Hospital-Acquired Illness or Injury  Outcome: Ongoing, Progressing     Problem: Breathing Pattern Ineffective  Goal: Effective Breathing Pattern  Outcome: Ongoing, Progressing

## 2024-02-01 NOTE — ASSESSMENT & PLAN NOTE
01/18/24 PT and OT to evaluate and treat   Brown show that she lives alone. She is household ambulator with rollator.      01/22/24 walked 16 feet with therapist during last session, continue therapy for strengthening    01/24/24 walked 30 feet with therapist yesterday    01/26/24 walked 40 feet with therapist yesterday    01/29/24 continue therapy for strengthening      01/30/24 worked with OT yesterday    02/01/24 walked 30 feet twice yesterday

## 2024-02-02 LAB
GLUCOSE SERPL-MCNC: 210 MG/DL (ref 70–105)
GLUCOSE SERPL-MCNC: 243 MG/DL (ref 70–105)
GLUCOSE SERPL-MCNC: 262 MG/DL (ref 70–105)
GLUCOSE SERPL-MCNC: 75 MG/DL (ref 70–105)

## 2024-02-02 PROCEDURE — 97535 SELF CARE MNGMENT TRAINING: CPT

## 2024-02-02 PROCEDURE — 63600175 PHARM REV CODE 636 W HCPCS: Performed by: NURSE PRACTITIONER

## 2024-02-02 PROCEDURE — 97116 GAIT TRAINING THERAPY: CPT | Mod: CQ

## 2024-02-02 PROCEDURE — 94761 N-INVAS EAR/PLS OXIMETRY MLT: CPT

## 2024-02-02 PROCEDURE — 27000221 HC OXYGEN, UP TO 24 HOURS

## 2024-02-02 PROCEDURE — 27000944

## 2024-02-02 PROCEDURE — 25000003 PHARM REV CODE 250: Performed by: NURSE PRACTITIONER

## 2024-02-02 PROCEDURE — 63600175 PHARM REV CODE 636 W HCPCS: Performed by: FAMILY MEDICINE

## 2024-02-02 PROCEDURE — 11000004 HC SNF PRIVATE

## 2024-02-02 PROCEDURE — 82962 GLUCOSE BLOOD TEST: CPT

## 2024-02-02 PROCEDURE — 27000982 HC MATTRESS, MATRIX LAL RENTAL

## 2024-02-02 PROCEDURE — 99900035 HC TECH TIME PER 15 MIN (STAT)

## 2024-02-02 RX ADMIN — LEVOTHYROXINE SODIUM 50 MCG: 50 TABLET ORAL at 05:02

## 2024-02-02 RX ADMIN — FLUTICASONE PROPIONATE 100 MCG: 50 SPRAY, METERED NASAL at 10:02

## 2024-02-02 RX ADMIN — HYDRALAZINE HYDROCHLORIDE 25 MG: 25 TABLET ORAL at 08:02

## 2024-02-02 RX ADMIN — SENNOSIDES AND DOCUSATE SODIUM 1 TABLET: 8.6; 5 TABLET ORAL at 10:02

## 2024-02-02 RX ADMIN — OXYCODONE HYDROCHLORIDE AND ACETAMINOPHEN 1000 MG: 500 TABLET ORAL at 10:02

## 2024-02-02 RX ADMIN — THEOPHYLLINE ANHYDROUS 200 MG: 200 CAPSULE, EXTENDED RELEASE ORAL at 10:02

## 2024-02-02 RX ADMIN — INSULIN DETEMIR 10 UNITS: 100 INJECTION, SOLUTION SUBCUTANEOUS at 09:02

## 2024-02-02 RX ADMIN — SENNOSIDES AND DOCUSATE SODIUM 1 TABLET: 8.6; 5 TABLET ORAL at 08:02

## 2024-02-02 RX ADMIN — Medication 400 MG: at 10:02

## 2024-02-02 RX ADMIN — ENOXAPARIN SODIUM 30 MG: 30 INJECTION SUBCUTANEOUS at 04:02

## 2024-02-02 RX ADMIN — INSULIN ASPART 3 UNITS: 100 INJECTION, SOLUTION INTRAVENOUS; SUBCUTANEOUS at 04:02

## 2024-02-02 RX ADMIN — THEOPHYLLINE ANHYDROUS 200 MG: 200 CAPSULE, EXTENDED RELEASE ORAL at 08:02

## 2024-02-02 RX ADMIN — OXYCODONE HYDROCHLORIDE AND ACETAMINOPHEN 1000 MG: 500 TABLET ORAL at 08:02

## 2024-02-02 RX ADMIN — FAMOTIDINE 20 MG: 20 TABLET ORAL at 10:02

## 2024-02-02 RX ADMIN — OMEGA-3 FATTY ACIDS CAP 1000 MG 1 CAPSULE: 1000 CAP at 10:02

## 2024-02-02 RX ADMIN — INSULIN ASPART 2 UNITS: 100 INJECTION, SOLUTION INTRAVENOUS; SUBCUTANEOUS at 11:02

## 2024-02-02 RX ADMIN — INSULIN ASPART 1 UNITS: 100 INJECTION, SOLUTION INTRAVENOUS; SUBCUTANEOUS at 09:02

## 2024-02-02 RX ADMIN — HYDRALAZINE HYDROCHLORIDE 25 MG: 25 TABLET ORAL at 10:02

## 2024-02-02 RX ADMIN — METOPROLOL TARTRATE 50 MG: 50 TABLET, FILM COATED ORAL at 10:02

## 2024-02-02 RX ADMIN — ACETAMINOPHEN 650 MG: 325 TABLET ORAL at 12:02

## 2024-02-02 NOTE — PT/OT/SLP PROGRESS
Occupational Therapy   Treatment    Name: Jackelin Owens  MRN: [id number]  Admitting Diagnosis:  Generalized weakness       Recommendations:     Discharge Recommendations: Low Intensity Therapy  Discharge Equipment Recommendations:  other (see comments)  Barriers to discharge:  None    Assessment:     Jackelin Owens is a 89 y.o. female with a medical diagnosis of Generalized weakness.  She presents with weakness and decline with ADLs. Performance deficits affecting function are weakness, impaired endurance, decreased lower extremity function, decreased safety awareness.     Rehab Prognosis:  Good; patient would benefit from acute skilled OT services to address these deficits and reach maximum level of function.       Plan:     Patient to be seen 5 x/week to address the above listed problems via self-care/home management, therapeutic activities, therapeutic exercises  Plan of Care Expires: 02/18/24  Plan of Care Reviewed with: patient    Subjective     Chief Complaint: Pain and weakness  Patient/Family Comments/goals: to return to prior level of function  Pain/Comfort:  Pain Rating 1: 0/10  Pain Rating Post-Intervention 1: 0/10    Objective:     Communicated with: Nurse prior to session.  Patient found supine with peripheral IV upon OT entry to room.    General Precautions: Standard, fall    Orthopedic Precautions:N/A  Braces: N/A  Respiratory Status: Room air     Occupational Performance:     Bed Mobility:    Patient completed Rolling/Turning to Left with  independence  Patient completed Rolling/Turning to Right with independence  Patient completed Scooting/Bridging with independence  Patient completed Supine to Sit with independence     Functional Mobility/Transfers:  Patient completed Sit <> Stand Transfer with supervision  with  rolling walker   Patient completed Bed <> Chair Transfer using Step Transfer technique with supervision with rolling walker  Patient completed Toilet Transfer Step Transfer technique with  contact guard assistance with  rolling walker to stand from low toilet. Patient will need a raised toilet frame to increase height of the toilet  Patient completed  Shower Transfer Step Transfer technique with supervision with rolling walker  Functional Mobility: Patient performed functional mobility with no unsafe fatigue    Activities of Daily Living:  Grooming: supervision to shampoo hair in shower. Patient required assistance to apply shampoo and lather only   Bathing: stand by assistance to perform shower using hand-held shower head and transfer tub bench  Upper Body Dressing: supervision to [facility] hospital gown and robe  Lower Body Dressing: supervision to pennie socks using compensatory techniques  Toileting: modified independence to perform toilet hygiene and clothing management      [facility] 6 Click ADL: 22    Treatment & Education  Compensatory techniques to perform in shower  Pt educated on OT role/POC.   Importance of OOB activity with staff assistance.  Importance of sitting up in the chair throughout the day as tolerated, especially for meals   Safety during functional t/f and mobility  Importance of assisting with self-care activities      Patient left up in chair with all lines intact, call button in reach, and PTA present    GOALS:   Multidisciplinary Problems       Occupational Therapy Goals          Problem: Occupational Therapy    Goal Priority Disciplines Outcome Interventions   Occupational Therapy Goal     OT, PT/OT Ongoing, Progressing    Description: Grooming Status:   Short Term Goal: Pt will perform grooming with Min A sitting EOB.   Long Term Goal: Pt will perform grooming/oral hygiene standing at sink with SBA.      LE dressing Status:   Short Term Goal: Pt will perform LE dressing with Min A.   Long Term Goal: Pt will perform LE dressing with SBA.    Toileting Status:   Short Term Goal: Pt will perform toilet hygiene on BSC with Min A.  Long Term Goal: Pt will perform toilet hygiene on toilet  with no AE with SBA.    Commode Transfer:   Short Term Goal: Pt will perform BSC t/f with Min A.  Long Term Goal:  Pt will perform toilet t/f in bathroom with SBA.     Bathing Status:   Long Term Goal: Pt will perform sponge bath with SBA with no unsafe fatigue.     Strength Status: 5/5 BUEs  Long Term Goal: Pt to perform BUE strengthening with weights and/or body weight to increase ADL independence and safety    Endurance Status:   Short Term Goal:pt to perform 15 min OT treatment with 5 or greater rest breaks  Long Term Goal: pt to perform 30 min OT treat with 3 or less rest breaks                          Time Tracking:     OT Date of Treatment: 02/02/24  OT Start Time: 0903  OT Stop Time: 0947  OT Total Time (min): 44 min    Billable Minutes:Self Care/Home Management 44 min    PARISA Thorpe/L, CSRS  OT/CUCA: OT          2/2/2024

## 2024-02-02 NOTE — PLAN OF CARE
Problem: Adult Inpatient Plan of Care  Goal: Patient-Specific Goal (Individualized)  Outcome: Ongoing, Progressing     Problem: Diabetes Comorbidity  Goal: Blood Glucose Level Within Targeted Range  Outcome: Ongoing, Progressing  Intervention: Monitor and Manage Glycemia  Flowsheets (Taken 2/2/2024 0528)  Glycemic Management:   blood glucose monitored   supplemental insulin given     Problem: Fall Injury Risk  Goal: Absence of Fall and Fall-Related Injury  Intervention: Promote Injury-Free Environment  Flowsheets (Taken 2/2/2024 0528)  Safety Promotion/Fall Prevention:   assistive device/personal item within reach   chair alarm set   bed alarm set   side rails raised x 2   instructed to call staff for mobility

## 2024-02-02 NOTE — PLAN OF CARE
Problem: Fall Injury Risk  Goal: Absence of Fall and Fall-Related Injury  Outcome: Ongoing, Progressing  Intervention: Promote Injury-Free Environment  Flowsheets (Taken 2/2/2024 1121)  Safety Promotion/Fall Prevention:   assistive device/personal item within reach   bed alarm set   chair alarm set   nonskid shoes/socks when out of bed   side rails raised x 3   instructed to call staff for mobility

## 2024-02-02 NOTE — PT/OT/SLP PROGRESS
Physical Therapy Treatment    Patient Name:  Jackelin Owens   MRN:  [id number]    Recommendations:     Discharge Recommendations: Low Intensity Therapy  Discharge Equipment Recommendations: other (see comments) (May need a rolling walker on discharge; PT will continue to assess.)  Barriers to discharge: Decreased caregiver support    Assessment:     Jackelin Owens is a 89 y.o. female admitted with a medical diagnosis of Generalized weakness.  She presents with the following impairments/functional limitations: weakness, impaired endurance, decreased lower extremity function, decreased safety awareness Patient with high spirits with therapy today. Patient with improvements noted in ambulation distance. Patient expresses her readiness to return home, and patient seems safe to do so with her daughter staying with her.     Rehab Prognosis: Fair; patient would benefit from acute skilled PT services to address these deficits and reach maximum level of function.    Recent Surgery: * No surgery found *      Plan:     During this hospitalization, patient to be seen 5 x/week to address the identified rehab impairments via gait training, therapeutic activities, therapeutic exercises, neuromuscular re-education and progress toward the following goals:    Plan of Care Expires:  02/08/24    Subjective     Chief Complaint: feeling unwell   Patient/Family Comments/goals: return back home   Pain/Comfort:  Pain Rating 1: 0/10  Pain Rating Post-Intervention 1: 0/10      Objective:     Communicated with OT prior to session.  Patient found  up in chair in therapy gym with OT present  with peripheral IV upon PT entry to room.     General Precautions: Standard, fall  Orthopedic Precautions: N/A  Braces: N/A  Respiratory Status: Room air     Functional Mobility:  Transfers:     Sit to Stand:  stand by assistance with rolling walker  Gait: Patient ambulated 220' with RW, CGA, and no LOB.      AM-PAC 6 CLICK MOBILITY  Turning over in bed  (including adjusting bedclothes, sheets and blankets)?: 4  Sitting down on and standing up from a chair with arms (e.g., wheelchair, bedside commode, etc.): 4  Moving from lying on back to sitting on the side of the bed?: 4  Moving to and from a bed to a chair (including a wheelchair)?: 4  Need to walk in hospital room?: 3  Climbing 3-5 steps with a railing?: 3  Basic Mobility Total Score: 22       Treatment & Education:   Transfers and ambulation as listed above.     Patient left up in chair with call button in reach, chair alarm on, and nurse present..    GOALS:   Multidisciplinary Problems       Physical Therapy Goals          Problem: Physical Therapy    Goal Priority Disciplines Outcome Goal Variances Interventions   Physical Therapy Goal     PT, PT/OT Ongoing, Progressing     Description: Short-term Goals: 1.5 weeks  1. Patient will perform supine to/from sit with standby assist to improve independence and safety with bed mobility.  2. Patient will perform sit to/from stand with a rolling walker with contact guard assist to improve independence and safety with transfers.  3. Patient will ambulate 75 feet with a rolling walker with contact guard assist to improve independence and safety with gait.  4. Patient will tolerate 15 minutes of physical therapy intervention to improve endurance and activity tolerance.    Long-term goals: 3 weeks  1. Patient will perform supine to/from sit with modified independence to improve independence and safety with bed mobility.  2. Patient will perform sit to/from stand with a rolling walker with standby assist to improve independence and safety with transfers.  3. Patient will ambulate 150 feet with a rolling walker with standby assist to improve independence and safety with gait.  4. Patient will tolerate 30 minutes of physical therapy intervention to improve endurance and activity tolerance.                         Time Tracking:     PT Received On: 02/02/24  PT Start Time:  0948     PT Stop Time: 1019  PT Total Time (min): 31 min     Billable Minutes: Gait Training 28    Treatment Type: Treatment  PT/PTA: PTA     Number of PTA visits since last PT visit: 3   GILBERTO Edmond   02/02/2024

## 2024-02-03 LAB
GLUCOSE SERPL-MCNC: 220 MG/DL (ref 70–105)
GLUCOSE SERPL-MCNC: 283 MG/DL (ref 70–105)
GLUCOSE SERPL-MCNC: 295 MG/DL (ref 70–105)
GLUCOSE SERPL-MCNC: 81 MG/DL (ref 70–105)

## 2024-02-03 PROCEDURE — 63600175 PHARM REV CODE 636 W HCPCS: Performed by: NURSE PRACTITIONER

## 2024-02-03 PROCEDURE — 11000004 HC SNF PRIVATE

## 2024-02-03 PROCEDURE — 94761 N-INVAS EAR/PLS OXIMETRY MLT: CPT

## 2024-02-03 PROCEDURE — 27000944

## 2024-02-03 PROCEDURE — 82962 GLUCOSE BLOOD TEST: CPT

## 2024-02-03 PROCEDURE — 27000982 HC MATTRESS, MATRIX LAL RENTAL

## 2024-02-03 PROCEDURE — 25000003 PHARM REV CODE 250: Performed by: NURSE PRACTITIONER

## 2024-02-03 PROCEDURE — 63600175 PHARM REV CODE 636 W HCPCS: Performed by: FAMILY MEDICINE

## 2024-02-03 RX ADMIN — OXYCODONE HYDROCHLORIDE AND ACETAMINOPHEN 1000 MG: 500 TABLET ORAL at 08:02

## 2024-02-03 RX ADMIN — THEOPHYLLINE ANHYDROUS 200 MG: 200 CAPSULE, EXTENDED RELEASE ORAL at 09:02

## 2024-02-03 RX ADMIN — THEOPHYLLINE ANHYDROUS 200 MG: 200 CAPSULE, EXTENDED RELEASE ORAL at 08:02

## 2024-02-03 RX ADMIN — OMEGA-3 FATTY ACIDS CAP 1000 MG 1 CAPSULE: 1000 CAP at 09:02

## 2024-02-03 RX ADMIN — FLUTICASONE PROPIONATE 100 MCG: 50 SPRAY, METERED NASAL at 09:02

## 2024-02-03 RX ADMIN — HYDRALAZINE HYDROCHLORIDE 25 MG: 25 TABLET ORAL at 09:02

## 2024-02-03 RX ADMIN — INSULIN ASPART 3 UNITS: 100 INJECTION, SOLUTION INTRAVENOUS; SUBCUTANEOUS at 11:02

## 2024-02-03 RX ADMIN — LEVOTHYROXINE SODIUM 50 MCG: 50 TABLET ORAL at 06:02

## 2024-02-03 RX ADMIN — FAMOTIDINE 20 MG: 20 TABLET ORAL at 09:02

## 2024-02-03 RX ADMIN — HYDRALAZINE HYDROCHLORIDE 25 MG: 25 TABLET ORAL at 08:02

## 2024-02-03 RX ADMIN — INSULIN DETEMIR 10 UNITS: 100 INJECTION, SOLUTION SUBCUTANEOUS at 09:02

## 2024-02-03 RX ADMIN — METOPROLOL TARTRATE 50 MG: 50 TABLET, FILM COATED ORAL at 09:02

## 2024-02-03 RX ADMIN — ENOXAPARIN SODIUM 30 MG: 30 INJECTION SUBCUTANEOUS at 04:02

## 2024-02-03 RX ADMIN — OXYCODONE HYDROCHLORIDE AND ACETAMINOPHEN 1000 MG: 500 TABLET ORAL at 09:02

## 2024-02-03 RX ADMIN — SENNOSIDES AND DOCUSATE SODIUM 1 TABLET: 8.6; 5 TABLET ORAL at 08:02

## 2024-02-03 RX ADMIN — INSULIN ASPART 1 UNITS: 100 INJECTION, SOLUTION INTRAVENOUS; SUBCUTANEOUS at 09:02

## 2024-02-03 RX ADMIN — SENNOSIDES AND DOCUSATE SODIUM 1 TABLET: 8.6; 5 TABLET ORAL at 09:02

## 2024-02-03 RX ADMIN — Medication 400 MG: at 09:02

## 2024-02-03 RX ADMIN — INSULIN ASPART 3 UNITS: 100 INJECTION, SOLUTION INTRAVENOUS; SUBCUTANEOUS at 04:02

## 2024-02-04 LAB
GLUCOSE SERPL-MCNC: 217 MG/DL (ref 70–105)
GLUCOSE SERPL-MCNC: 234 MG/DL (ref 70–105)
GLUCOSE SERPL-MCNC: 253 MG/DL (ref 70–105)
GLUCOSE SERPL-MCNC: 98 MG/DL (ref 70–105)

## 2024-02-04 PROCEDURE — 63600175 PHARM REV CODE 636 W HCPCS: Performed by: NURSE PRACTITIONER

## 2024-02-04 PROCEDURE — 27000982 HC MATTRESS, MATRIX LAL RENTAL

## 2024-02-04 PROCEDURE — 25000003 PHARM REV CODE 250: Performed by: NURSE PRACTITIONER

## 2024-02-04 PROCEDURE — 27000221 HC OXYGEN, UP TO 24 HOURS

## 2024-02-04 PROCEDURE — 99900035 HC TECH TIME PER 15 MIN (STAT)

## 2024-02-04 PROCEDURE — 82962 GLUCOSE BLOOD TEST: CPT

## 2024-02-04 PROCEDURE — 27000944

## 2024-02-04 PROCEDURE — 63600175 PHARM REV CODE 636 W HCPCS: Performed by: FAMILY MEDICINE

## 2024-02-04 PROCEDURE — 94761 N-INVAS EAR/PLS OXIMETRY MLT: CPT

## 2024-02-04 PROCEDURE — 11000004 HC SNF PRIVATE

## 2024-02-04 RX ADMIN — Medication 400 MG: at 08:02

## 2024-02-04 RX ADMIN — THEOPHYLLINE ANHYDROUS 200 MG: 200 CAPSULE, EXTENDED RELEASE ORAL at 08:02

## 2024-02-04 RX ADMIN — INSULIN ASPART 2 UNITS: 100 INJECTION, SOLUTION INTRAVENOUS; SUBCUTANEOUS at 04:02

## 2024-02-04 RX ADMIN — OMEGA-3 FATTY ACIDS CAP 1000 MG 1 CAPSULE: 1000 CAP at 08:02

## 2024-02-04 RX ADMIN — OXYCODONE HYDROCHLORIDE AND ACETAMINOPHEN 1000 MG: 500 TABLET ORAL at 08:02

## 2024-02-04 RX ADMIN — HYDRALAZINE HYDROCHLORIDE 25 MG: 25 TABLET ORAL at 09:02

## 2024-02-04 RX ADMIN — INSULIN ASPART 3 UNITS: 100 INJECTION, SOLUTION INTRAVENOUS; SUBCUTANEOUS at 11:02

## 2024-02-04 RX ADMIN — SENNOSIDES AND DOCUSATE SODIUM 1 TABLET: 8.6; 5 TABLET ORAL at 09:02

## 2024-02-04 RX ADMIN — OXYCODONE HYDROCHLORIDE AND ACETAMINOPHEN 1000 MG: 500 TABLET ORAL at 09:02

## 2024-02-04 RX ADMIN — HYDRALAZINE HYDROCHLORIDE 25 MG: 25 TABLET ORAL at 08:02

## 2024-02-04 RX ADMIN — SENNOSIDES AND DOCUSATE SODIUM 1 TABLET: 8.6; 5 TABLET ORAL at 08:02

## 2024-02-04 RX ADMIN — ENOXAPARIN SODIUM 30 MG: 30 INJECTION SUBCUTANEOUS at 04:02

## 2024-02-04 RX ADMIN — INSULIN DETEMIR 10 UNITS: 100 INJECTION, SOLUTION SUBCUTANEOUS at 09:02

## 2024-02-04 RX ADMIN — LEVOTHYROXINE SODIUM 50 MCG: 50 TABLET ORAL at 06:02

## 2024-02-04 RX ADMIN — THEOPHYLLINE ANHYDROUS 200 MG: 200 CAPSULE, EXTENDED RELEASE ORAL at 09:02

## 2024-02-04 RX ADMIN — FLUTICASONE PROPIONATE 100 MCG: 50 SPRAY, METERED NASAL at 08:02

## 2024-02-04 RX ADMIN — INSULIN ASPART 1 UNITS: 100 INJECTION, SOLUTION INTRAVENOUS; SUBCUTANEOUS at 09:02

## 2024-02-04 RX ADMIN — METOPROLOL TARTRATE 50 MG: 50 TABLET, FILM COATED ORAL at 08:02

## 2024-02-04 RX ADMIN — FAMOTIDINE 20 MG: 20 TABLET ORAL at 08:02

## 2024-02-04 NOTE — PLAN OF CARE
Problem: Adult Inpatient Plan of Care  Goal: Patient-Specific Goal (Individualized)  Outcome: Ongoing, Progressing  Goal: Absence of Hospital-Acquired Illness or Injury  Outcome: Ongoing, Progressing     Problem: Fall Injury Risk  Goal: Absence of Fall and Fall-Related Injury  Outcome: Ongoing, Progressing  Intervention: Identify and Manage Contributors  Flowsheets (Taken 2/4/2024 7442)  Self-Care Promotion:   BADL personal objects within reach   BADL personal routines maintained  Medication Review/Management: medications reviewed     Problem: Oral Intake Inadequate  Goal: Improved Oral Intake  Outcome: Ongoing, Progressing

## 2024-02-05 PROBLEM — R63.0 POOR APPETITE: Status: RESOLVED | Noted: 2024-01-18 | Resolved: 2024-02-05

## 2024-02-05 PROBLEM — B02.9 HERPES ZOSTER WITHOUT COMPLICATION: Status: RESOLVED | Noted: 2024-01-22 | Resolved: 2024-02-05

## 2024-02-05 LAB
ANION GAP SERPL CALCULATED.3IONS-SCNC: 13 MMOL/L (ref 7–16)
BASOPHILS # BLD AUTO: 0.04 K/UL (ref 0–0.2)
BASOPHILS NFR BLD AUTO: 0.7 % (ref 0–1)
BUN SERPL-MCNC: 24 MG/DL (ref 7–18)
BUN/CREAT SERPL: 29 (ref 6–20)
CALCIUM SERPL-MCNC: 9.9 MG/DL (ref 8.5–10.1)
CHLORIDE SERPL-SCNC: 107 MMOL/L (ref 98–107)
CO2 SERPL-SCNC: 26 MMOL/L (ref 21–32)
CREAT SERPL-MCNC: 0.82 MG/DL (ref 0.55–1.02)
DIFFERENTIAL METHOD BLD: ABNORMAL
EGFR (NO RACE VARIABLE) (RUSH/TITUS): 68 ML/MIN/1.73M2
EOSINOPHIL # BLD AUTO: 0.43 K/UL (ref 0–0.5)
EOSINOPHIL NFR BLD AUTO: 7.2 % (ref 1–4)
EOSINOPHIL NFR BLD MANUAL: 8 % (ref 1–4)
ERYTHROCYTE [DISTWIDTH] IN BLOOD BY AUTOMATED COUNT: 14.8 % (ref 11.5–14.5)
GLUCOSE SERPL-MCNC: 135 MG/DL (ref 74–106)
GLUCOSE SERPL-MCNC: 174 MG/DL (ref 70–105)
GLUCOSE SERPL-MCNC: 304 MG/DL (ref 70–105)
GLUCOSE SERPL-MCNC: 84 MG/DL (ref 70–105)
HCT VFR BLD AUTO: 36.6 % (ref 38–47)
HGB BLD-MCNC: 11.9 G/DL (ref 12–16)
LYMPHOCYTES # BLD AUTO: 1.61 K/UL (ref 1–4.8)
LYMPHOCYTES NFR BLD AUTO: 26.9 % (ref 27–41)
LYMPHOCYTES NFR BLD MANUAL: 28 % (ref 27–41)
MCH RBC QN AUTO: 28.5 PG (ref 27–31)
MCHC RBC AUTO-ENTMCNC: 32.5 G/DL (ref 32–36)
MCV RBC AUTO: 87.6 FL (ref 80–96)
MONOCYTES # BLD AUTO: 0.9 K/UL (ref 0–0.8)
MONOCYTES NFR BLD AUTO: 15.1 % (ref 2–6)
MONOCYTES NFR BLD MANUAL: 17 % (ref 2–6)
MPC BLD CALC-MCNC: 12.2 FL (ref 9.4–12.4)
NEUTROPHILS # BLD AUTO: 3 K/UL (ref 1.8–7.7)
NEUTROPHILS NFR BLD AUTO: 50.1 % (ref 53–65)
NEUTS BAND NFR BLD MANUAL: 7 % (ref 1–5)
NEUTS SEG NFR BLD MANUAL: 40 % (ref 50–62)
NRBC BLD MANUAL-RTO: ABNORMAL %
PLATELET # BLD AUTO: 148 K/UL (ref 150–400)
PLATELET MORPHOLOGY: NORMAL
POTASSIUM SERPL-SCNC: 4.1 MMOL/L (ref 3.5–5.1)
RBC # BLD AUTO: 4.18 M/UL (ref 4.2–5.4)
RBC MORPH BLD: NORMAL
SODIUM SERPL-SCNC: 142 MMOL/L (ref 136–145)
WBC # BLD AUTO: 5.98 K/UL (ref 4.5–11)

## 2024-02-05 PROCEDURE — 63600175 PHARM REV CODE 636 W HCPCS: Performed by: FAMILY MEDICINE

## 2024-02-05 PROCEDURE — 82962 GLUCOSE BLOOD TEST: CPT

## 2024-02-05 PROCEDURE — 25000003 PHARM REV CODE 250: Performed by: NURSE PRACTITIONER

## 2024-02-05 PROCEDURE — 94761 N-INVAS EAR/PLS OXIMETRY MLT: CPT

## 2024-02-05 PROCEDURE — 85025 COMPLETE CBC W/AUTO DIFF WBC: CPT | Performed by: NURSE PRACTITIONER

## 2024-02-05 PROCEDURE — 99307 SBSQ NF CARE SF MDM 10: CPT | Mod: ,,, | Performed by: NURSE PRACTITIONER

## 2024-02-05 PROCEDURE — 97530 THERAPEUTIC ACTIVITIES: CPT

## 2024-02-05 PROCEDURE — 97110 THERAPEUTIC EXERCISES: CPT

## 2024-02-05 PROCEDURE — 11000004 HC SNF PRIVATE

## 2024-02-05 PROCEDURE — 97116 GAIT TRAINING THERAPY: CPT

## 2024-02-05 PROCEDURE — 27000944

## 2024-02-05 PROCEDURE — 63600175 PHARM REV CODE 636 W HCPCS: Performed by: NURSE PRACTITIONER

## 2024-02-05 PROCEDURE — 27000982 HC MATTRESS, MATRIX LAL RENTAL

## 2024-02-05 PROCEDURE — 99900035 HC TECH TIME PER 15 MIN (STAT)

## 2024-02-05 PROCEDURE — 80048 BASIC METABOLIC PNL TOTAL CA: CPT | Performed by: NURSE PRACTITIONER

## 2024-02-05 RX ORDER — FAMOTIDINE 20 MG/1
20 TABLET, FILM COATED ORAL NIGHTLY
Qty: 30 TABLET | Refills: 0 | Status: SHIPPED | OUTPATIENT
Start: 2024-02-05 | End: 2024-03-06

## 2024-02-05 RX ORDER — GABAPENTIN 100 MG/1
100 CAPSULE ORAL 3 TIMES DAILY PRN
Qty: 60 CAPSULE | Refills: 0 | Status: SHIPPED | OUTPATIENT
Start: 2024-02-05 | End: 2024-03-06

## 2024-02-05 RX ORDER — LEVOTHYROXINE SODIUM 50 UG/1
50 TABLET ORAL
Qty: 30 TABLET | Refills: 0 | Status: SHIPPED | OUTPATIENT
Start: 2024-02-05 | End: 2024-03-06

## 2024-02-05 RX ORDER — FLUTICASONE PROPIONATE 50 MCG
2 SPRAY, SUSPENSION (ML) NASAL DAILY
Qty: 15.8 ML | Refills: 0 | Status: SHIPPED | OUTPATIENT
Start: 2024-02-06 | End: 2024-03-07

## 2024-02-05 RX ORDER — LANOLIN ALCOHOL/MO/W.PET/CERES
400 CREAM (GRAM) TOPICAL DAILY
Qty: 30 TABLET | Refills: 0 | Status: SHIPPED | OUTPATIENT
Start: 2024-02-06 | End: 2024-03-07

## 2024-02-05 RX ORDER — HYDRALAZINE HYDROCHLORIDE 25 MG/1
25 TABLET, FILM COATED ORAL 2 TIMES DAILY
Qty: 60 TABLET | Refills: 1 | Status: SHIPPED | OUTPATIENT
Start: 2024-02-05 | End: 2024-04-05

## 2024-02-05 RX ORDER — AMOXICILLIN 500 MG
1 CAPSULE ORAL DAILY
Qty: 30 CAPSULE | Refills: 0 | Status: SHIPPED | OUTPATIENT
Start: 2024-02-05 | End: 2024-03-06

## 2024-02-05 RX ORDER — GLUCOSAM/CHONDRO/HERB 149/HYAL 750-100 MG
1 TABLET ORAL DAILY
Qty: 30 CAPSULE | Refills: 0 | Status: SHIPPED | OUTPATIENT
Start: 2024-02-06 | End: 2024-03-07

## 2024-02-05 RX ORDER — THEOPHYLLINE 200 MG/1
200 TABLET, EXTENDED RELEASE ORAL EVERY 12 HOURS
Qty: 60 TABLET | Refills: 0 | Status: SHIPPED | OUTPATIENT
Start: 2024-02-05 | End: 2024-03-06

## 2024-02-05 RX ORDER — ACETAMINOPHEN 325 MG/1
650 TABLET ORAL EVERY 6 HOURS PRN
Refills: 0
Start: 2024-02-05

## 2024-02-05 RX ORDER — METFORMIN HYDROCHLORIDE 500 MG/1
500 TABLET ORAL
Qty: 30 TABLET | Refills: 0 | Status: SHIPPED | OUTPATIENT
Start: 2024-02-05 | End: 2024-02-06

## 2024-02-05 RX ORDER — DOCUSATE SODIUM 100 MG/1
100 CAPSULE, LIQUID FILLED ORAL 2 TIMES DAILY PRN
Qty: 30 CAPSULE | Refills: 0 | Status: SHIPPED | OUTPATIENT
Start: 2024-02-05 | End: 2024-03-06

## 2024-02-05 RX ORDER — METOPROLOL TARTRATE 50 MG/1
50 TABLET ORAL DAILY
Qty: 30 TABLET | Refills: 0 | Status: SHIPPED | OUTPATIENT
Start: 2024-02-05 | End: 2024-03-06

## 2024-02-05 RX ADMIN — LEVOTHYROXINE SODIUM 50 MCG: 50 TABLET ORAL at 06:02

## 2024-02-05 RX ADMIN — OXYCODONE HYDROCHLORIDE AND ACETAMINOPHEN 1000 MG: 500 TABLET ORAL at 08:02

## 2024-02-05 RX ADMIN — THEOPHYLLINE ANHYDROUS 200 MG: 200 CAPSULE, EXTENDED RELEASE ORAL at 09:02

## 2024-02-05 RX ADMIN — INSULIN DETEMIR 12 UNITS: 100 INJECTION, SOLUTION SUBCUTANEOUS at 08:02

## 2024-02-05 RX ADMIN — INSULIN ASPART 1 UNITS: 100 INJECTION, SOLUTION INTRAVENOUS; SUBCUTANEOUS at 08:02

## 2024-02-05 RX ADMIN — FAMOTIDINE 20 MG: 20 TABLET ORAL at 09:02

## 2024-02-05 RX ADMIN — SENNOSIDES AND DOCUSATE SODIUM 1 TABLET: 8.6; 5 TABLET ORAL at 09:02

## 2024-02-05 RX ADMIN — SENNOSIDES AND DOCUSATE SODIUM 1 TABLET: 8.6; 5 TABLET ORAL at 08:02

## 2024-02-05 RX ADMIN — FLUTICASONE PROPIONATE 100 MCG: 50 SPRAY, METERED NASAL at 09:02

## 2024-02-05 RX ADMIN — THEOPHYLLINE ANHYDROUS 200 MG: 200 CAPSULE, EXTENDED RELEASE ORAL at 08:02

## 2024-02-05 RX ADMIN — HYDRALAZINE HYDROCHLORIDE 25 MG: 25 TABLET ORAL at 09:02

## 2024-02-05 RX ADMIN — HYDRALAZINE HYDROCHLORIDE 25 MG: 25 TABLET ORAL at 08:02

## 2024-02-05 RX ADMIN — ENOXAPARIN SODIUM 30 MG: 30 INJECTION SUBCUTANEOUS at 04:02

## 2024-02-05 RX ADMIN — INSULIN ASPART 4 UNITS: 100 INJECTION, SOLUTION INTRAVENOUS; SUBCUTANEOUS at 11:02

## 2024-02-05 RX ADMIN — Medication 400 MG: at 09:02

## 2024-02-05 RX ADMIN — OXYCODONE HYDROCHLORIDE AND ACETAMINOPHEN 1000 MG: 500 TABLET ORAL at 09:02

## 2024-02-05 RX ADMIN — OMEGA-3 FATTY ACIDS CAP 1000 MG 1 CAPSULE: 1000 CAP at 09:02

## 2024-02-05 RX ADMIN — METOPROLOL TARTRATE 50 MG: 50 TABLET, FILM COATED ORAL at 09:02

## 2024-02-05 NOTE — ASSESSMENT & PLAN NOTE
01/18/24 continue lopressor 50 mg po daily     And apresoline 25 mg po BID      While in the hospital, will manage blood pressure as follows; Continue home antihypertensive regimen    Will utilize p.r.n. blood pressure medication only if patient's blood pressure greater than 180/110 and she develops symptoms such as worsening chest pain or shortness of breath.      01/23/24 stable     01/30/24 stable    02/05/24 stable

## 2024-02-05 NOTE — PT/OT/SLP PROGRESS
Occupational Therapy   Treatment    Name: Jackelin Owens  MRN: [id number]  Admitting Diagnosis:  Generalized weakness       Recommendations:     Discharge Recommendations: Low Intensity Therapy  Discharge Equipment Recommendations:  other (see comments)  Barriers to discharge:  None    Assessment:     Jackelin Owens is a 89 y.o. female with a medical diagnosis of Generalized weakness.  She presents with weakness and decline with ADLs. Performance deficits affecting function are weakness, impaired endurance, decreased lower extremity function, decreased safety awareness.     Rehab Prognosis:  Good; patient would benefit from acute skilled OT services to address these deficits and reach maximum level of function.       Plan:     Patient to be seen 5 x/week to address the above listed problems via self-care/home management, therapeutic activities, therapeutic exercises  Plan of Care Expires: 02/18/24  Plan of Care Reviewed with: patient    Subjective     Chief Complaint: no complaints  Patient/Family Comments/goals: to return to prior level of function  Pain/Comfort:       Objective:     Communicated with: Nurse prior to session.  Patient found supine with peripheral IV upon OT entry to room.    General Precautions: Standard, fall    Orthopedic Precautions:N/A  Braces: N/A  Respiratory Status: Room air     Occupational Performance:     Bed Mobility:    Not tested     Functional Mobility/Transfers:  Patient completed Sit <> Stand Transfer with modified independence  with  rolling walker   Functional Mobility: Patient transferred within room with no difficulty    Activities of Daily Living:  Not tested      [facility] 6 Click ADL:      Treatment & Education:  Pt performed B UE strengthening exercises to include:   UE bike x 6 min  Shoulder flexion   Chest press    Elbow flexion   Elbow extension   Pectoral stretches   Bilateral rowing  All exercises performed 3x10 reps using 2# weight and red theraband.     Patient left up in  chair with all lines intact and call button in reach    GOALS:   Multidisciplinary Problems       Occupational Therapy Goals          Problem: Occupational Therapy    Goal Priority Disciplines Outcome Interventions   Occupational Therapy Goal     OT, PT/OT Ongoing, Progressing    Description: Grooming Status:   Short Term Goal: Pt will perform grooming with Min A sitting EOB.   Long Term Goal: Pt will perform grooming/oral hygiene standing at sink with SBA.      LE dressing Status:   Short Term Goal: Pt will perform LE dressing with Min A.   Long Term Goal: Pt will perform LE dressing with SBA.    Toileting Status:   Short Term Goal: Pt will perform toilet hygiene on BSC with Min A.  Long Term Goal: Pt will perform toilet hygiene on toilet with no AE with SBA.    Commode Transfer:   Short Term Goal: Pt will perform BSC t/f with Min A.  Long Term Goal:  Pt will perform toilet t/f in bathroom with SBA.     Bathing Status:   Long Term Goal: Pt will perform sponge bath with SBA with no unsafe fatigue.     Strength Status: 5/5 BUEs  Long Term Goal: Pt to perform BUE strengthening with weights and/or body weight to increase ADL independence and safety    Endurance Status:   Short Term Goal:pt to perform 15 min OT treatment with 5 or greater rest breaks  Long Term Goal: pt to perform 30 min OT treat with 3 or less rest breaks                          Time Tracking:     OT Date of Treatment: 02/05/24  OT Start Time: 0954  OT Stop Time: 1050  OT Total Time (min): 56 min    Billable Minutes:Therapeutic Activity 20 min  Therapeutic Exercise 36 min      PARISA Thorpe/L, CSRS  OT/CUCA: OT          2/5/2024

## 2024-02-05 NOTE — PLAN OF CARE
Problem: Adult Inpatient Plan of Care  Goal: Plan of Care Review  2/5/2024 0408 by Reji Inman RN  Outcome: Ongoing, Progressing  2/5/2024 0407 by Reji Inman RN  Outcome: Ongoing, Not Progressing  Goal: Patient-Specific Goal (Individualized)  2/5/2024 0408 by Reji Inman RN  Outcome: Ongoing, Progressing  2/5/2024 0407 by Reji Inman RN  Outcome: Ongoing, Not Progressing  Goal: Absence of Hospital-Acquired Illness or Injury  2/5/2024 0408 by Reji Inman RN  Outcome: Ongoing, Progressing  2/5/2024 0407 by Reji Inman RN  Outcome: Ongoing, Not Progressing  Goal: Optimal Comfort and Wellbeing  2/5/2024 0408 by Reji Inman RN  Outcome: Ongoing, Progressing  2/5/2024 0407 by Reji Inman RN  Outcome: Ongoing, Not Progressing  Goal: Readiness for Transition of Care  2/5/2024 0408 by Reji Inman RN  Outcome: Ongoing, Progressing  2/5/2024 0407 by Reji Inman RN  Outcome: Ongoing, Not Progressing     Problem: Diabetes Comorbidity  Goal: Blood Glucose Level Within Targeted Range  2/5/2024 0408 by Reji Inman RN  Outcome: Ongoing, Progressing  2/5/2024 0407 by Reji Inman RN  Outcome: Ongoing, Not Progressing     Problem: Skin Injury Risk Increased  Goal: Skin Health and Integrity  2/5/2024 0408 by Reji Inman RN  Outcome: Ongoing, Progressing  2/5/2024 0407 by Reji Inman RN  Outcome: Ongoing, Not Progressing     Problem: Fall Injury Risk  Goal: Absence of Fall and Fall-Related Injury  2/5/2024 0408 by Reji Inman RN  Outcome: Ongoing, Progressing  2/5/2024 0407 by Reji Inman RN  Outcome: Ongoing, Not Progressing     Problem: Oral Intake Inadequate  Goal: Improved Oral Intake  2/5/2024 0408 by Reji Inman RN  Outcome: Ongoing, Progressing  2/5/2024 0407 by Reji Inman RN  Outcome: Ongoing, Not Progressing     Problem: Malnutrition  Goal: Improved Nutritional Intake  2/5/2024 0408 by Reji Inman, KATIUSKA  Outcome: Ongoing, Progressing  2/5/2024 0407 by Reji Inman,  RN  Outcome: Ongoing, Not Progressing     Problem: Chest Pain  Goal: Resolution of Chest Pain Symptoms  2/5/2024 0408 by Reji Inman RN  Outcome: Ongoing, Progressing  2/5/2024 0407 by Reji Inman RN  Outcome: Ongoing, Not Progressing     Problem: Violence Risk or Actual  Goal: Anger and Impulse Control  2/5/2024 0408 by Reji Inman RN  Outcome: Ongoing, Progressing  2/5/2024 0407 by Reji Inman RN  Outcome: Ongoing, Not Progressing     Problem: Breathing Pattern Ineffective  Goal: Effective Breathing Pattern  2/5/2024 0408 by Reji Inman RN  Outcome: Ongoing, Progressing  2/5/2024 0407 by Reji Inman RN  Outcome: Ongoing, Not Progressing     Problem: Gas Exchange Impaired  Goal: Optimal Gas Exchange  2/5/2024 0408 by Reji Inman RN  Outcome: Ongoing, Progressing  2/5/2024 0407 by Reji Inman RN  Outcome: Ongoing, Not Progressing

## 2024-02-05 NOTE — SUBJECTIVE & OBJECTIVE
Interval History: weakness    Review of Systems   Constitutional:  Negative for appetite change, fatigue and fever.        Appetite good   HENT:  Negative for sore throat and trouble swallowing.    Eyes:  Negative for redness.   Respiratory:  Negative for cough, choking, chest tightness and shortness of breath.    Gastrointestinal:  Negative for abdominal distention, abdominal pain, constipation, diarrhea, nausea and vomiting.   Genitourinary:  Negative for dysuria.   Musculoskeletal:  Negative for arthralgias, back pain, neck pain and neck stiffness.   Skin:  Negative for pallor and wound.   Neurological:  Positive for weakness. Negative for light-headedness and headaches.     Objective:     Vital Signs (Most Recent):  Temp: 97.7 °F (36.5 °C) (02/05/24 0719)  Pulse: 89 (02/05/24 0719)  Resp: 20 (02/05/24 0719)  BP: 119/67 (02/05/24 0719)  SpO2: 96 % (02/05/24 0719) Vital Signs (24h Range):  Temp:  [97.3 °F (36.3 °C)-97.7 °F (36.5 °C)] 97.7 °F (36.5 °C)  Pulse:  [76-89] 89  Resp:  [18-20] 20  SpO2:  [96 %-97 %] 96 %  BP: (119-167)/(67-76) 119/67     Weight: 49.1 kg (108 lb 3.2 oz)  Body mass index is 19.79 kg/m².    Intake/Output Summary (Last 24 hours) at 2/5/2024 1002  Last data filed at 2/5/2024 0907  Gross per 24 hour   Intake 240 ml   Output 9 ml   Net 231 ml         Physical Exam  HENT:      Head: Normocephalic.      Mouth/Throat:      Mouth: Mucous membranes are moist.   Cardiovascular:      Rate and Rhythm: Normal rate. Rhythm irregular.      Pulses: Normal pulses.      Heart sounds: No murmur heard.     No friction rub. No gallop.   Pulmonary:      Effort: Pulmonary effort is normal. No respiratory distress.      Breath sounds: Normal breath sounds. No wheezing or rhonchi.      Comments: Sat on room air is 95%  Abdominal:      General: Bowel sounds are normal. There is no distension.      Palpations: Abdomen is soft. There is no mass.      Tenderness: There is no abdominal tenderness. There is no guarding  or rebound.      Hernia: No hernia is present.   Musculoskeletal:         General: Normal range of motion.      Cervical back: Normal range of motion.   Skin:     General: Skin is warm and dry.      Coloration: Skin is not pale.      Findings: No erythema, lesion or rash.   Neurological:      Mental Status: She is alert and oriented to person, place, and time.      Motor: Weakness present.             Significant Labs: All pertinent labs within the past 24 hours have been reviewed.  Recent Lab Results  (Last 5 results in the past 24 hours)        02/05/24  0743   02/05/24  0606   02/04/24  1943   02/04/24  1553   02/04/24  1127        Anion Gap 13               Bands 7               Baso # 0.04               Basophil % 0.7               BUN 24               BUN/CREAT RATIO 29               Calcium 9.9               Chloride 107               CO2 26               Creatinine 0.82               Differential Method Manual               eGFR 68               Eos # 0.43               Eos % 7.2                8               Glucose 135               Hematocrit 36.6               Hemoglobin 11.9               Lymph # 1.61               Lymph % 26.9                28               MCH 28.5               MCHC 32.5               MCV 87.6               Mono # 0.90               Mono % 15.1                17               MPV 12.2               Neutrophils, Abs 3.00               Neutrophils Relative 50.1               nRBC               PLATELET MORPHOLOGY Normal               Platelet Count 148               POC Glucose   84   234   217   253       Potassium 4.1               RBC 4.18               RBC Morphology Normal               RDW 14.8               Segmented Neutrophils, Man % 40               Sodium 142               WBC 5.98                                      Significant Imaging: I have reviewed all pertinent imaging results/findings within the past 24 hours.

## 2024-02-05 NOTE — PLAN OF CARE
[facility] - Medical Surgical Unit  Discharge Assessment    Primary Care Provider: No, Primary Doctor     Discharge Assessment (most recent)       BRIEF DISCHARGE ASSESSMENT - 02/05/24 3408          Discharge Planning    Assessment Type Discharge Planning Brief Assessment (P)      Support Systems Family members (P)      Patient/Family Anticipated [facility] at [facility] (P)      Discharge Plan A Home with family (P)                    Spoke with Ms. Owens earlier this morning about reaching therapy goals and her discharge plans. She says she wants to go home.  Talked with her daughter Iesha Willoughby and she says her mother wants to go home alone but she has found two ladies in her neighborhood that wants to make extra money by sitting with her. Verbal consent given for Patient Choice and IMM forms from Iesha Willoughby.

## 2024-02-05 NOTE — PT/OT/SLP PROGRESS
Physical Therapy Treatment    Patient Name:  Jackelin Owens   MRN:  [id number]    Recommendations:     Discharge Recommendations: Low Intensity Therapy  Discharge Equipment Recommendations: walker, rolling  Barriers to discharge: None    Assessment:     Jackelin Owens is a 89 y.o. female admitted with a medical diagnosis of Generalized weakness. She presents with the following impairments/functional limitations: weakness, impaired endurance, decreased lower extremity function, decreased safety awareness. Patient able to ambulate further today (180 feet with 3 standing rest breaks due to mild dyspnea on exertion). Patient is anticipating discharge home with assist from her daughters on 2/8/2024.    Rehab Prognosis: Good; patient would benefit from acute skilled PT services to address these deficits and reach maximum level of function.    Recent Surgery: * No surgery found *      Plan:     During this hospitalization, patient to be seen 5 x/week to address the identified rehab impairments via gait training, therapeutic activities, therapeutic exercises, neuromuscular re-education and progress toward the following goals:    Plan of Care Expires:  02/08/24    Subjective     Chief Complaint: generalized weakness  Patient/Family Comments/goals: Patient's goal is to discharge home with the assist of her daughters on 2/8/2024.  Pain/Comfort:  Pain Rating 1: 0/10  Pain Rating Post-Intervention 1: 0/10    Objective:     Communicated with KATIUSKA Andrew prior to session. Patient found sitting edge of bed with KATIUSKA Andrew at bedside administering meds upon PT entry to room.     General Precautions: Standard, fall  Orthopedic Precautions: N/A  Braces: N/A  Respiratory Status: Room air     Functional Mobility:  Transfers:     Sit to Stand:  contact guard assistance with rolling walker  Gait: x 180 feet with rolling walker with contact guard assist; 3 standing rest breaks due to mild dyspnea on exertion; no loss of balance    AM-PAC 6 CLICK  MOBILITY  Turning over in bed (including adjusting bedclothes, sheets and blankets)?: 4  Sitting down on and standing up from a chair with arms (e.g., wheelchair, bedside commode, etc.): 3  Moving from lying on back to sitting on the side of the bed?: 3  Moving to and from a bed to a chair (including a wheelchair)?: 3  Need to walk in hospital room?: 3  Climbing 3-5 steps with a railing?: 3  Basic Mobility Total Score: 19     Treatment & Education:    Transfers and gait performed as listed above.    Ankle pumps: x 15 reps  Heel slides: x 15 reps each  Hip abduction/adduction reclined in chair: 1.5 lbs; x 15 reps each  Hip adduction squeezes: x 15 reps with 3 second holds  Long arc quads: 1.5 lbs; x 15 reps each  Seated marchin.5 lbs; x 15 reps each    Patient left up in chair with  Renata Lynn OT present preparing to complete occupational therapy treatment.     GOALS:   Multidisciplinary Problems       Physical Therapy Goals          Problem: Physical Therapy    Goal Priority Disciplines Outcome Goal Variances Interventions   Physical Therapy Goal     PT, PT/OT Ongoing, Progressing     Description: Short-term Goals: 1.5 weeks  1. Patient will perform supine to/from sit with standby assist to improve independence and safety with bed mobility.  2. Patient will perform sit to/from stand with a rolling walker with contact guard assist to improve independence and safety with transfers.  3. Patient will ambulate 75 feet with a rolling walker with contact guard assist to improve independence and safety with gait.  4. Patient will tolerate 15 minutes of physical therapy intervention to improve endurance and activity tolerance.    Long-term goals: 3 weeks  1. Patient will perform supine to/from sit with modified independence to improve independence and safety with bed mobility.  2. Patient will perform sit to/from stand with a rolling walker with standby assist to improve independence and safety with transfers.  3.  Patient will ambulate 150 feet with a rolling walker with standby assist to improve independence and safety with gait.  4. Patient will tolerate 30 minutes of physical therapy intervention to improve endurance and activity tolerance.                       Time Tracking:     PT Received On: 02/05/24  PT Start Time: 0917     PT Stop Time: 0953  PT Total Time (min): 36 min     Billable Minutes: Gait Training 18 minutes and Therapeutic Exercise 18 minutes    Treatment Type: Treatment, 6th Visit  PT/PTA: PT     Number of PTA visits since last PT visit: 0     02/05/2024

## 2024-02-05 NOTE — PROGRESS NOTES
[facility] - Medical Surgical Unit  Hospital Medicine  Progress Note    Patient Name: Jackelin Owens  MRN: [id number]  Patient Class: IP- Swing   Admission Date: 1/18/2024  Length of Stay: 18 days  Attending Physician: Home Alexis IV, DO  Primary Care Provider: Maria A, Primary Doctor        Subjective:     Principal Problem:Generalized weakness        HPI:  Jackelin Owens is an 89 year old female with pmh of hypertension, type 2 diabetes mellitus, hypothyroidism and interstitial lung disease.Records show that she was diagnosed with covid and flu approx 2 1/2 weeks ago. She was not prescribed tamiflu or paxlovid but was given an antibiotic. She presented to ER at [facility] with increasing SOB on 01/14/2024. Admitted and treated for covid 19 pneumonia. She tested positive for covid while in [facility] ER. She was treated with Levaquin and remdesevir, supplemental o2 . She has been working with therapy on strengthening but continues to have weakness. She has been accepted to [facility] for swing bed placement       01/18/24 VS on arrival are T98.1-87-/80 sat 95% room air. She is awake, alert, oriented. Accompanied by family members. Complains of occasional productive cough, weakness and poor appetite. Discussed code status and she chose DNI.    Overview/Hospital Course:  01/22/24 Awake and resting in bed. States she does not want to have anymore labs drawn, states she was stuck multiple times this morning and she cannot handle it. Complains of pain left flank radiating to back. States this started yesterday. States pain is at level 10. States area is itchy and painful. Noted small cluster rash left mammary fold- no blisters yet. Will start gabapentin and valcyclovir. Chest is clear. Sat on room air is 95%.   1200 reported to me that Mrs. Owens was complaining of chest pain and that she was tachycardic at rate of 127. Went to room to see her. She complained of having chest pain. States chest  feels heavy. Asked her when this pain began and she states it has been ongoing. Asked her if she had this pain yesterday, she replied she did. Asked her if she had it last week, she again replied that she did- that it has been ongoing. She also complains of having back pain as well -left upper back. She complains of needing to have BM but states she cannot. Complains that she has to urinate. Took her to bathroom , she urinated and had small BM. EKG and troponin ordered. EKG shows sinus tachycardia. Troponin normal at 9.6.   1235 Dr. Alexis here to assess pt and heard expiratory wheezing. Chest xray pending. Duonebs ordered prn. Solumedrol 60 mg IV every 8 hours ordered. Sat on room air is 97%.    1250 Mrs. Owens states she is feeling better. Sat is 97%.    1300 Went back to check on Mrs. Owens and she states she cannot breath. Sat is 97%. Chest xray pending. BNP pending .Telemetry shows sinus tachycardia with rate from 120s to 130s.  States she just wants to die. To just let her die.     1335 Sat continues to be 97% on room air. Heart rate 130s- will give metoprolol po and monitor.     1345 Daughter Clau Nair is in room and Mrs. Campos is much more calm. Heart rate down to 114. Continue to monitor on telemetry.      01/23/24 Awake and resting in bed. Complains of being hungry this morning. Blood glucose increased to over 300- increased levemir to 10 units nightly. Will decrease steroids. No complaints of SOB or chest pain this morning. Does report pain to left flank radiating to back. Continue valcyclovir.      01/23/24 nurse reports poct glucose over 500 - will have lab to verify- increase to moderate SSI. Levemir increased to 10 units nightly. Methylprednisone dcd      01/24/24 Awake and complaining of frontal headache. Tylenol given for relief. Blood glucose this morning is 115.continue levemir and mod SSI.     01/26/24 Awake and sitting up in bed . States she is feeling better this morning. Walked 40 feet  "with therapist yesterday. Complains of dry skin. Will give moisturizing lotion to use prn.      01/29/24 Awake and smiling. Mood improved. States she had really good day yesterday. Appetite has improved. Continue to work with therapy on strengthening      01/30/24 Awake and sitting on side of the bed. Complained od felling "woozy headed and itching." No rash noted but scratch marks seen to right posterior flank. VS stable. Applie moisturizing lotion and she states skin felt much better. Review of meds shows that she had lorazepam last night which could be causing her symptoms of felling " woozy."  Will add atarax for itcing prn.      02/01/24 Awake and resting in bed. Complains of having occasional productive cough. Reports that she worked with therapy yesterday. Complained about sitting up in chair too long. Encouraged her to continue to work with therapy - she walked 30 feet twice yesterday. Encouraged her to sit up as therapy requests because that will strengthen her as well. States she will do so.      02/05/24 Awake and resting in bed. Reports she is feeling much better. Smiling this morning. She walked 220 feet with therapist Friday. Probable dc soon. States she will probably be going home with one of her daughters for a while then back to her home.     Interval History: weakness    Review of Systems   Constitutional:  Negative for appetite change, fatigue and fever.        Appetite good   HENT:  Negative for sore throat and trouble swallowing.    Eyes:  Negative for redness.   Respiratory:  Negative for cough, choking, chest tightness and shortness of breath.    Gastrointestinal:  Negative for abdominal distention, abdominal pain, constipation, diarrhea, nausea and vomiting.   Genitourinary:  Negative for dysuria.   Musculoskeletal:  Negative for arthralgias, back pain, neck pain and neck stiffness.   Skin:  Negative for pallor and wound.   Neurological:  Positive for weakness. Negative for light-headedness " and headaches.     Objective:     Vital Signs (Most Recent):  Temp: 97.7 °F (36.5 °C) (02/05/24 0719)  Pulse: 89 (02/05/24 0719)  Resp: 20 (02/05/24 0719)  BP: 119/67 (02/05/24 0719)  SpO2: 96 % (02/05/24 0719) Vital Signs (24h Range):  Temp:  [97.3 °F (36.3 °C)-97.7 °F (36.5 °C)] 97.7 °F (36.5 °C)  Pulse:  [76-89] 89  Resp:  [18-20] 20  SpO2:  [96 %-97 %] 96 %  BP: (119-167)/(67-76) 119/67     Weight: 49.1 kg (108 lb 3.2 oz)  Body mass index is 19.79 kg/m².    Intake/Output Summary (Last 24 hours) at 2/5/2024 1002  Last data filed at 2/5/2024 0907  Gross per 24 hour   Intake 240 ml   Output 9 ml   Net 231 ml         Physical Exam  HENT:      Head: Normocephalic.      Mouth/Throat:      Mouth: Mucous membranes are moist.   Cardiovascular:      Rate and Rhythm: Normal rate. Rhythm irregular.      Pulses: Normal pulses.      Heart sounds: No murmur heard.     No friction rub. No gallop.   Pulmonary:      Effort: Pulmonary effort is normal. No respiratory distress.      Breath sounds: Normal breath sounds. No wheezing or rhonchi.      Comments: Sat on room air is 95%  Abdominal:      General: Bowel sounds are normal. There is no distension.      Palpations: Abdomen is soft. There is no mass.      Tenderness: There is no abdominal tenderness. There is no guarding or rebound.      Hernia: No hernia is present.   Musculoskeletal:         General: Normal range of motion.      Cervical back: Normal range of motion.   Skin:     General: Skin is warm and dry.      Coloration: Skin is not pale.      Findings: No erythema, lesion or rash.   Neurological:      Mental Status: She is alert and oriented to person, place, and time.      Motor: Weakness present.             Significant Labs: All pertinent labs within the past 24 hours have been reviewed.  Recent Lab Results  (Last 5 results in the past 24 hours)        02/05/24  0743   02/05/24  0606   02/04/24  1943   02/04/24  1553   02/04/24  1127        Anion Gap 13                Bands 7               Baso # 0.04               Basophil % 0.7               BUN 24               BUN/CREAT RATIO 29               Calcium 9.9               Chloride 107               CO2 26               Creatinine 0.82               Differential Method Manual               eGFR 68               Eos # 0.43               Eos % 7.2                8               Glucose 135               Hematocrit 36.6               Hemoglobin 11.9               Lymph # 1.61               Lymph % 26.9                28               MCH 28.5               MCHC 32.5               MCV 87.6               Mono # 0.90               Mono % 15.1                17               MPV 12.2               Neutrophils, Abs 3.00               Neutrophils Relative 50.1               nRBC               PLATELET MORPHOLOGY Normal               Platelet Count 148               POC Glucose   84   234   217   253       Potassium 4.1               RBC 4.18               RBC Morphology Normal               RDW 14.8               Segmented Neutrophils, Man % 40               Sodium 142               WBC 5.98                                      Significant Imaging: I have reviewed all pertinent imaging results/findings within the past 24 hours.    Assessment/Plan:      * Generalized weakness  01/18/24 PT and OT to evaluate and treat   Reocrds show that she lives alone. She is household ambulator with rollator.      01/22/24 walked 16 feet with therapist during last session, continue therapy for strengthening    01/24/24 walked 30 feet with therapist yesterday    01/26/24 walked 40 feet with therapist yesterday    01/29/24 continue therapy for strengthening      01/30/24 worked with OT yesterday    02/01/24 walked 30 feet twice yesterday    02/05/24 walked 220 feet with therapist Friday, probable soon    Type 2 diabetes mellitus  Home med is glimepiride 4 mg po bid   Last A1c reviewed- ordered  Current correctional scale  Low  Maintain anti-hyperglycemic  dose as follows-   Antihyperglycemics (From admission, onward)      Start     Stop Route Frequency Ordered    01/26/24 0902  insulin aspart U-100 injection 0-5 Units         -- SubQ Before meals & nightly PRN 01/26/24 0802    01/23/24 2100  insulin detemir U-100 injection 10 Units         -- SubQ Nightly 01/23/24 0727          Hold Oral hypoglycemics while patient is in the hospital.    Accuchecks ac and hs with low dose SSI coverage       01/22/24 add 5 units levemir at hs     01/23/24 increase levemir to 10 units   Increased SSI to moderate  Dcd steroids    01/24/24 Blood glucose 115 this morning      01/26/24 continue levemir and SSI       01/29/24 stable    02/01/24 stable    02/05/24 stable    Hypertension  01/18/24 continue lopressor 50 mg po daily     And apresoline 25 mg po BID      While in the hospital, will manage blood pressure as follows; Continue home antihypertensive regimen    Will utilize p.r.n. blood pressure medication only if patient's blood pressure greater than 180/110 and she develops symptoms such as worsening chest pain or shortness of breath.      01/23/24 stable     01/30/24 stable    02/05/24 stable    Interstitial lung disease  01/18/24 continue theophylline 200 mg po BID       01/24/23 decrease steroids, duonebs prn wheezing    01/26/24 sat on room air is 96%    Hypothyroidism  01/18/24 continue levothyroxine 50 mcg daily      01/24/23 continue levothyroxine      01/30/24 continue levothyroxine     GERD (gastroesophageal reflux disease)  01/18/24 continue famotidine          VTE Risk Mitigation (From admission, onward)           Ordered     enoxaparin injection 30 mg  Every 24 hours         01/19/24 0706     IP VTE LOW RISK PATIENT  Once         01/18/24 1434     Place CARLOS hose  Until discontinued         01/18/24 1434                    Discharge Planning   CHELSEA: 2/8/2024     Code Status: Partial Code   Is the patient medically ready for discharge?:     Reason for patient still in  hospital (select all that apply): Treatment  Discharge Plan A: Home with family, Home Health                  MARCELINA Mir  Department of Hospital Medicine   [facility]

## 2024-02-05 NOTE — ASSESSMENT & PLAN NOTE
01/18/24 PT and OT to evaluate and treat   Brown show that she lives alone. She is household ambulator with rollator.      01/22/24 walked 16 feet with therapist during last session, continue therapy for strengthening    01/24/24 walked 30 feet with therapist yesterday    01/26/24 walked 40 feet with therapist yesterday    01/29/24 continue therapy for strengthening      01/30/24 worked with OT yesterday    02/01/24 walked 30 feet twice yesterday    02/05/24 walked 220 feet with therapist Friday, probable soon

## 2024-02-05 NOTE — ASSESSMENT & PLAN NOTE
Home med is glimepiride 4 mg po bid   Last A1c reviewed- ordered  Current correctional scale  Low  Maintain anti-hyperglycemic dose as follows-   Antihyperglycemics (From admission, onward)      Start     Stop Route Frequency Ordered    01/26/24 0902  insulin aspart U-100 injection 0-5 Units         -- SubQ Before meals & nightly PRN 01/26/24 0802    01/23/24 2100  insulin detemir U-100 injection 10 Units         -- SubQ Nightly 01/23/24 0727          Hold Oral hypoglycemics while patient is in the hospital.    Accuchecks ac and hs with low dose SSI coverage       01/22/24 add 5 units levemir at hs     01/23/24 increase levemir to 10 units   Increased SSI to moderate  Dcd steroids    01/24/24 Blood glucose 115 this morning      01/26/24 continue levemir and SSI       01/29/24 stable    02/01/24 stable    02/05/24 stable

## 2024-02-06 VITALS
HEART RATE: 87 BPM | RESPIRATION RATE: 20 BRPM | OXYGEN SATURATION: 95 % | HEIGHT: 62 IN | DIASTOLIC BLOOD PRESSURE: 69 MMHG | BODY MASS INDEX: 19.91 KG/M2 | SYSTOLIC BLOOD PRESSURE: 141 MMHG | WEIGHT: 108.19 LBS | TEMPERATURE: 98 F

## 2024-02-06 LAB
GLUCOSE SERPL-MCNC: 203 MG/DL (ref 70–105)
GLUCOSE SERPL-MCNC: 298 MG/DL (ref 70–105)
GLUCOSE SERPL-MCNC: 301 MG/DL (ref 70–105)
GLUCOSE SERPL-MCNC: 86 MG/DL (ref 70–105)

## 2024-02-06 PROCEDURE — 99900035 HC TECH TIME PER 15 MIN (STAT)

## 2024-02-06 PROCEDURE — 99316 NF DSCHRG MGMT 30 MIN+: CPT | Mod: ,,, | Performed by: FAMILY MEDICINE

## 2024-02-06 PROCEDURE — 63600175 PHARM REV CODE 636 W HCPCS: Performed by: NURSE PRACTITIONER

## 2024-02-06 PROCEDURE — 25000003 PHARM REV CODE 250: Performed by: NURSE PRACTITIONER

## 2024-02-06 PROCEDURE — 82962 GLUCOSE BLOOD TEST: CPT

## 2024-02-06 PROCEDURE — 94761 N-INVAS EAR/PLS OXIMETRY MLT: CPT

## 2024-02-06 RX ORDER — METFORMIN HYDROCHLORIDE 500 MG/1
500 TABLET ORAL 2 TIMES DAILY WITH MEALS
Qty: 60 TABLET | Refills: 0 | Status: SHIPPED | OUTPATIENT
Start: 2024-02-06 | End: 2024-03-07

## 2024-02-06 RX ADMIN — FAMOTIDINE 20 MG: 20 TABLET ORAL at 09:02

## 2024-02-06 RX ADMIN — INSULIN ASPART 4 UNITS: 100 INJECTION, SOLUTION INTRAVENOUS; SUBCUTANEOUS at 11:02

## 2024-02-06 RX ADMIN — METOPROLOL TARTRATE 50 MG: 50 TABLET, FILM COATED ORAL at 09:02

## 2024-02-06 RX ADMIN — THEOPHYLLINE ANHYDROUS 200 MG: 200 CAPSULE, EXTENDED RELEASE ORAL at 09:02

## 2024-02-06 RX ADMIN — LEVOTHYROXINE SODIUM 50 MCG: 50 TABLET ORAL at 05:02

## 2024-02-06 RX ADMIN — FLUTICASONE PROPIONATE 100 MCG: 50 SPRAY, METERED NASAL at 09:02

## 2024-02-06 RX ADMIN — OMEGA-3 FATTY ACIDS CAP 1000 MG 1 CAPSULE: 1000 CAP at 09:02

## 2024-02-06 RX ADMIN — HYDRALAZINE HYDROCHLORIDE 25 MG: 25 TABLET ORAL at 09:02

## 2024-02-06 RX ADMIN — OXYCODONE HYDROCHLORIDE AND ACETAMINOPHEN 1000 MG: 500 TABLET ORAL at 09:02

## 2024-02-06 RX ADMIN — SENNOSIDES AND DOCUSATE SODIUM 1 TABLET: 8.6; 5 TABLET ORAL at 09:02

## 2024-02-06 RX ADMIN — Medication 400 MG: at 09:02

## 2024-02-06 NOTE — PLAN OF CARE
[facility]  Discharge Final Note    Primary Care Provider: No, Primary Doctor    Expected Discharge Date: 2/6/2024    Final Discharge Note (most recent)       Final Note - 02/06/24 1110          Final Note    Assessment Type Final Discharge Note (P)      Anticipated Discharge Disposition Home-Health Care Svc (P)      What phone number can be called within the next 1-3 days to see how you are doing after discharge? [phone] (P)      Hospital Resources/Appts/Education Provided Provided patient/caregiver with written discharge plan information;Provided education on problems/symptoms using teachback;Appointments scheduled and added to AVS (P)         Post-Acute Status    Post-Acute Authorization Home Health (P)      Home Health Status Set-up Complete/Auth obtained (P)      Coverage medicare A & B (P)      Patient choice form signed by patient/caregiver List with quality metrics by geographic area provided;List from CMS Compare (P)      Discharge Delays None known at this time (P)                      Important Message from Medicare  Important Message from Medicare regarding Discharge Appeal Rights: Given to patient/caregiver, Explained to patient/caregiver, Signed/date by patient/caregiver     Date IMM was signed: 02/05/24  Time IMM was signed: 1445    Contact Chaparrita Taylor   Specialty: Family Medicine    [facility] [place]  [facility] [place] [address]   Phone: [phone]       Next Steps: Follow up in 3 day(s)    Instructions: Friday 2-09-24 at 9:45         Ms. Owens will discharge home with her family and be followed by [facility].

## 2024-02-06 NOTE — ASSESSMENT & PLAN NOTE
Home med is glimepiride 4 mg po bid   Last A1c reviewed- ordered  Current correctional scale  Low  Maintain anti-hyperglycemic dose as follows-   Antihyperglycemics (From admission, onward)      Start     Stop Route Frequency Ordered    02/05/24 2100  insulin detemir U-100 injection 12 Units         -- SubQ Nightly 02/05/24 1252    01/26/24 0902  insulin aspart U-100 injection 0-5 Units         -- SubQ Before meals & nightly PRN 01/26/24 0802          Hold Oral hypoglycemics while patient is in the hospital.    Accuchecks ac and hs with low dose SSI coverage     02/06/24 dc home on metformin 500 mg po BID , keep log of blood glucose levels and take to follow up appt    01/22/24 add 5 units levemir at hs     01/23/24 increase levemir to 10 units   Increased SSI to moderate  Dcd steroids    01/24/24 Blood glucose 115 this morning      01/26/24 continue levemir and SSI       01/29/24 stable    02/01/24 stable    02/05/24 stable

## 2024-02-06 NOTE — ASSESSMENT & PLAN NOTE
01/18/24 continue levothyroxine 50 mcg daily      01/24/23 continue levothyroxine      01/30/24 continue levothyroxine     02/06/24 continue levothyroxine

## 2024-02-06 NOTE — NURSING
Patients daughter Clau here to  her and take her home - Medication given to daughter - to front exit per wheel chair with belongings home in stable condition

## 2024-02-06 NOTE — ASSESSMENT & PLAN NOTE
01/18/24 continue theophylline 200 mg po BID       01/24/23 decrease steroids, duonebs prn wheezing    01/26/24 sat on room air is 96%    02/06/24 continue theophylline

## 2024-02-06 NOTE — PLAN OF CARE
Problem: Adult Inpatient Plan of Care  Goal: Optimal Comfort and Wellbeing  Outcome: Ongoing, Progressing     Problem: Diabetes Comorbidity  Goal: Blood Glucose Level Within Targeted Range  Outcome: Ongoing, Progressing     Problem: Skin Injury Risk Increased  Goal: Skin Health and Integrity  Outcome: Ongoing, Progressing     Problem: Fall Injury Risk  Goal: Absence of Fall and Fall-Related Injury  Outcome: Ongoing, Progressing

## 2024-02-06 NOTE — DISCHARGE SUMMARY
[facility] - Medical Surgical Unit  Hospital Medicine  Discharge Summary      Patient Name: Jackelin Owens  MRN: [id number]  NADEGE: [id number]  Patient Class: IP- Swing  Admission Date: 1/18/2024  Hospital Length of Stay: 19 days  Discharge Date and Time:  02/06/2024 10:05 AM  Attending Physician: Home Alexis IV, DO   Discharging Provider: MARCELINA Mir  Primary Care Provider: Maria A Primary Doctor    Primary Care Team: Networked reference to record PCT     HPI:   Jackelin Owens is an 89 year old female with pmh of hypertension, type 2 diabetes mellitus, hypothyroidism and interstitial lung disease.Records show that she was diagnosed with covid and flu approx 2 1/2 weeks ago. She was not prescribed tamiflu or paxlovid but was given an antibiotic. She presented to ER at [facility] with increasing SOB on 01/14/2024. Admitted and treated for covid 19 pneumonia. She tested positive for covid while in [facility] ER. She was treated with Levaquin and remdesevir, supplemental o2 . She has been working with therapy on strengthening but continues to have weakness. She has been accepted to [facility] for swing bed placement       01/18/24 VS on arrival are T98.1-87-/80 sat 95% room air. She is awake, alert, oriented. Accompanied by family members. Complains of occasional productive cough, weakness and poor appetite. Discussed code status and she chose DNI.    * No surgery found *      Hospital Course:   01/22/24 Awake and resting in bed. States she does not want to have anymore labs drawn, states she was stuck multiple times this morning and she cannot handle it. Complains of pain left flank radiating to back. States this started yesterday. States pain is at level 10. States area is itchy and painful. Noted small cluster rash left mammary fold- no blisters yet. Will start gabapentin and valcyclovir. Chest is clear. Sat on room air is 95%.   1200 reported to me that Mrs. Owens was complaining  of chest pain and that she was tachycardic at rate of 127. Went to room to see her. She complained of having chest pain. States chest feels heavy. Asked her when this pain began and she states it has been ongoing. Asked her if she had this pain yesterday, she replied she did. Asked her if she had it last week, she again replied that she did- that it has been ongoing. She also complains of having back pain as well -left upper back. She complains of needing to have BM but states she cannot. Complains that she has to urinate. Took her to bathroom , she urinated and had small BM. EKG and troponin ordered. EKG shows sinus tachycardia. Troponin normal at 9.6.   1235 Dr. Alexis here to assess pt and heard expiratory wheezing. Chest xray pending. Duonebs ordered prn. Solumedrol 60 mg IV every 8 hours ordered. Sat on room air is 97%.    1250 Mrs. Owens states she is feeling better. Sat is 97%.    1300 Went back to check on Mrs. Owens and she states she cannot breath. Sat is 97%. Chest xray pending. BNP pending .Telemetry shows sinus tachycardia with rate from 120s to 130s.  States she just wants to die. To just let her die.     1335 Sat continues to be 97% on room air. Heart rate 130s- will give metoprolol po and monitor.     1345 Daughter Clau Nair is in room and Mrs. Campos is much more calm. Heart rate down to 114. Continue to monitor on telemetry.      01/23/24 Awake and resting in bed. Complains of being hungry this morning. Blood glucose increased to over 300- increased levemir to 10 units nightly. Will decrease steroids. No complaints of SOB or chest pain this morning. Does report pain to left flank radiating to back. Continue valcyclovir.      01/23/24 nurse reports poct glucose over 500 - will have lab to verify- increase to moderate SSI. Levemir increased to 10 units nightly. Methylprednisone dcd      01/24/24 Awake and complaining of frontal headache. Tylenol given for relief. Blood glucose this morning is  "115.continue levemir and mod SSI.     01/26/24 Awake and sitting up in bed . States she is feeling better this morning. Walked 40 feet with therapist yesterday. Complains of dry skin. Will give moisturizing lotion to use prn.      01/29/24 Awake and smiling. Mood improved. States she had really good day yesterday. Appetite has improved. Continue to work with therapy on strengthening      01/30/24 Awake and sitting on side of the bed. Complained od felling "woozy headed and itching." No rash noted but scratch marks seen to right posterior flank. VS stable. Applie moisturizing lotion and she states skin felt much better. Review of meds shows that she had lorazepam last night which could be causing her symptoms of felling " woozy."  Will add atarax for itcing prn.      02/01/24 Awake and resting in bed. Complains of having occasional productive cough. Reports that she worked with therapy yesterday. Complained about sitting up in chair too long. Encouraged her to continue to work with therapy - she walked 30 feet twice yesterday. Encouraged her to sit up as therapy requests because that will strengthen her as well. States she will do so.      02/05/24 Awake and resting in bed. Reports she is feeling much better. Smiling this morning. She walked 220 feet with therapist Friday. Probable dc soon. States she will probably be going home with one of her daughters for a while then back to her home.       02/06/24 DC home with home health with PT and OT to follow. Medications reconciled. Spoke with daughter Cecily Rivers re home dose of metformin 500 mg po BID. Make follow up appt with PCP Chaparrita HEWITT for follow up.     Goals of Care Treatment Preferences:  Code Status: Partial Code      Consults:   Consults (From admission, onward)          Status Ordering Provider     Inpatient consult to Registered Dietitian/Nutritionist  Once        Provider:  (Not yet assigned)    Completed DONALD ARNOLD"       Pulmonary  Interstitial lung disease  01/18/24 continue theophylline 200 mg po BID       01/24/23 decrease steroids, duonebs prn wheezing    01/26/24 sat on room air is 96%    02/06/24 continue theophylline     Cardiac/Vascular  Hypertension  01/18/24 continue lopressor 50 mg po daily     And apresoline 25 mg po BID      While in the hospital, will manage blood pressure as follows; Continue home antihypertensive regimen    Will utilize p.r.n. blood pressure medication only if patient's blood pressure greater than 180/110 and she develops symptoms such as worsening chest pain or shortness of breath.      01/23/24 stable     01/30/24 stable    02/05/24 stable    02/06/24 continue lopressor and hydralazine    Endocrine  Hypothyroidism  01/18/24 continue levothyroxine 50 mcg daily      01/24/23 continue levothyroxine      01/30/24 continue levothyroxine     02/06/24 continue levothyroxine     Type 2 diabetes mellitus  Home med is glimepiride 4 mg po bid   Last A1c reviewed- ordered  Current correctional scale  Low  Maintain anti-hyperglycemic dose as follows-   Antihyperglycemics (From admission, onward)      Start     Stop Route Frequency Ordered    02/05/24 2100  insulin detemir U-100 injection 12 Units         -- SubQ Nightly 02/05/24 1252    01/26/24 0902  insulin aspart U-100 injection 0-5 Units         -- SubQ Before meals & nightly PRN 01/26/24 0802          Hold Oral hypoglycemics while patient is in the hospital.    Accuchecks ac and hs with low dose SSI coverage     02/06/24 dc home on metformin 500 mg po BID , keep log of blood glucose levels and take to follow up appt    01/22/24 add 5 units levemir at hs     01/23/24 increase levemir to 10 units   Increased SSI to moderate  Dcd steroids    01/24/24 Blood glucose 115 this morning      01/26/24 continue levemir and SSI       01/29/24 stable    02/01/24 stable    02/05/24 stable      Final Active Diagnoses:    Diagnosis Date Noted POA    PRINCIPAL  PROBLEM:  Generalized weakness [R53.1] 01/18/2024 Yes    Type 2 diabetes mellitus [E11.9] 01/18/2024 Yes    Hypertension [I10] 01/18/2024 Yes    Interstitial lung disease [J84.9] 01/18/2024 Yes    Hypothyroidism [E03.9] 01/18/2024 Yes    GERD (gastroesophageal reflux disease) [K21.9] 01/18/2024 Yes      Problems Resolved During this Admission:    Diagnosis Date Noted Date Resolved POA    Pneumonia due to COVID-19 virus [U07.1, J12.82] 01/18/2024 01/24/2024 Yes    Poor appetite [R63.0] 01/18/2024 02/05/2024 Yes    Herpes zoster without complication [B02.9] 01/22/2024 02/05/2024 No    Rheumatoid arthritis, unspecified [M06.9] 01/18/2024 01/18/2024 Unknown       Discharged Condition: stable    Disposition: Home or Self Care    Follow Up:   Follow-up Information       Chaparrita Champion Follow up in 3 day(s).    Specialty: Family Medicine  Contact information:  [address] [place] [address] [phone]                           Patient Instructions:   No discharge procedures on file.    Significant Diagnostic Studies: Labs: All labs within the past 24 hours have been reviewed    Pending Diagnostic Studies:       None           Medications:  Reconciled Home Medications:      Medication List        START taking these medications      acetaminophen 325 MG tablet  Commonly known as: TYLENOL  Take 2 tablets (650 mg total) by mouth every 6 (six) hours as needed for Temperature greater than or Pain (100.4 F).     albuterol sulfate 90 mcg/actuation inhaler  Commonly known as: PROAIR RESPICLICK  Inhale 2 puffs into the lungs every 6 (six) hours as needed for Wheezing. Rescue     fluticasone propionate 50 mcg/actuation nasal spray  Commonly known as: FLONASE  2 sprays (100 mcg total) by Each Nostril route once daily.     magnesium oxide 400 mg (241.3 mg magnesium) tablet  Commonly known as: MAG-OX  Take 1 tablet (400 mg total) by mouth once daily.     metFORMIN 500 MG tablet  Commonly known as: GLUCOPHAGE  Take 1  tablet (500 mg total) by mouth 2 (two) times daily with meals.     omega 3-dha-epa-fish oil 1,000 mg (120 mg-180 mg) Cap  Take 1 capsule by mouth once daily.     theophylline 200 MG 12 hr tablet  Commonly known as: THEODUR  Take 1 tablet (200 mg total) by mouth every 12 (twelve) hours.  Replaces: theophylline 400 mg Tb24            CHANGE how you take these medications      gabapentin 100 MG capsule  Commonly known as: NEURONTIN  Take 1 capsule (100 mg total) by mouth 3 (three) times daily as needed (nerve pain).  What changed: reasons to take this     metoprolol tartrate 50 MG tablet  Commonly known as: LOPRESSOR  Take 1 tablet (50 mg total) by mouth once daily.  What changed: medication strength            CONTINUE taking these medications      docusate sodium 100 MG capsule  Commonly known as: COLACE  Take 1 capsule (100 mg total) by mouth 2 (two) times daily as needed for Constipation.     famotidine 20 MG tablet  Commonly known as: PEPCID  Take 1 tablet (20 mg total) by mouth nightly.     fish oil-omega-3 fatty acids 300-1,000 mg capsule  Take 1 capsule by mouth once daily.     hydrALAZINE 25 MG tablet  Commonly known as: APRESOLINE  Take 1 tablet (25 mg total) by mouth 2 (two) times a day.     levothyroxine 50 MCG tablet  Commonly known as: SYNTHROID  Take 1 tablet (50 mcg total) by mouth before breakfast.            STOP taking these medications      cetirizine 10 MG tablet  Commonly known as: ZYRTEC     levoFLOXacin 500 MG tablet  Commonly known as: LEVAQUIN     meclizine 50 MG tablet  Commonly known as: ANTIVERT     theophylline 400 mg Tb24  Replaced by: theophylline 200 MG 12 hr tablet     VITAMIN C 1000 MG tablet  Generic drug: ascorbic acid (vitamin C)     vitamin D 1000 units Tab  Commonly known as: VITAMIN D3     zinc sulfate 50 mg zinc (220 mg) capsule  Commonly known as: ZINCATE              Indwelling Lines/Drains at time of discharge:   Lines/Drains/Airways       None                   Time spent  on the discharge of patient: 35 minutes         Home Alexis IV, DO  Department of Hospital Medicine  [facility] - Medical Surgical Unit

## 2024-02-06 NOTE — PLAN OF CARE
Problem: Adult Inpatient Plan of Care  Goal: Plan of Care Review  Outcome: Met  Goal: Patient-Specific Goal (Individualized)  Outcome: Met  Goal: Absence of Hospital-Acquired Illness or Injury  Outcome: Met  Goal: Optimal Comfort and Wellbeing  Outcome: Met  Goal: Readiness for Transition of Care  Outcome: Met     Problem: Diabetes Comorbidity  Goal: Blood Glucose Level Within Targeted Range  Outcome: Met     Problem: Skin Injury Risk Increased  Goal: Skin Health and Integrity  Outcome: Met     Problem: Fall Injury Risk  Goal: Absence of Fall and Fall-Related Injury  Outcome: Met     Problem: Oral Intake Inadequate  Goal: Improved Oral Intake  Outcome: Met     Problem: Malnutrition  Goal: Improved Nutritional Intake  Outcome: Met     Problem: Chest Pain  Goal: Resolution of Chest Pain Symptoms  Outcome: Met     Problem: Violence Risk or Actual  Goal: Anger and Impulse Control  Outcome: Met     Problem: Breathing Pattern Ineffective  Goal: Effective Breathing Pattern  Outcome: Met     Problem: Gas Exchange Impaired  Goal: Optimal Gas Exchange  Outcome: Met

## 2024-02-06 NOTE — ASSESSMENT & PLAN NOTE
01/18/24 continue lopressor 50 mg po daily     And apresoline 25 mg po BID      While in the hospital, will manage blood pressure as follows; Continue home antihypertensive regimen    Will utilize p.r.n. blood pressure medication only if patient's blood pressure greater than 180/110 and she develops symptoms such as worsening chest pain or shortness of breath.      01/23/24 stable     01/30/24 stable    02/05/24 stable    02/06/24 continue lopressor and hydralazine

## 2024-02-06 NOTE — PLAN OF CARE
Problem: Adult Inpatient Plan of Care  Goal: Plan of Care Review  Outcome: Ongoing, Progressing  Goal: Absence of Hospital-Acquired Illness or Injury  Outcome: Ongoing, Progressing     Problem: Gas Exchange Impaired  Goal: Optimal Gas Exchange  Outcome: Ongoing, Progressing

## 2024-02-06 NOTE — PLAN OF CARE
[facility]  Discharge Assessment    Primary Care Provider: No, Primary Doctor     Discharge Assessment (most recent)       BRIEF DISCHARGE ASSESSMENT - 02/06/24 1112          Discharge Planning    Assessment Type Discharge Planning Brief Assessment (P)                    Physical Therapy recommended patient have a rolling walker ordered for home use.  I attempted to order one from [employer] but because she had received a rollator previously insuranace will not pay for the rolling walker.  I made her daughter Iesha Willoughby aware and made José Manuel Davila, MARILYN aware and asked if he could work with her using a rollator prior to discharging.

## 2024-02-07 ENCOUNTER — PATIENT OUTREACH (OUTPATIENT)
Dept: ADMINISTRATIVE | Facility: CLINIC | Age: 89
End: 2024-02-07

## 2024-02-07 NOTE — PROGRESS NOTES
Contacted Iesha regarding [facility].    Informed Iesha the delay in home health was due to no residential address listed.    Inform Iesha that Chaparrita from [facility] states they are working on getting her admitted now.

## 2024-02-07 NOTE — PROGRESS NOTES
C3 nurse spoke with Jackelin Owens's daughter, Iesha,  for a TCC post hospital discharge follow up call. The patient has a scheduled HOSFU appointment with Chaparrita Champion  on 2/9/2024 @ 986.   Unable to route due to being a non [facility] provider

## 2024-02-09 ENCOUNTER — TELEPHONE (OUTPATIENT)
Dept: MEDSURG UNIT | Facility: HOSPITAL | Age: 89
End: 2024-02-09
Payer: MEDICARE